# Patient Record
Sex: MALE | Race: WHITE | Employment: FULL TIME | URBAN - METROPOLITAN AREA
[De-identification: names, ages, dates, MRNs, and addresses within clinical notes are randomized per-mention and may not be internally consistent; named-entity substitution may affect disease eponyms.]

---

## 2017-01-08 ENCOUNTER — HOSPITAL ENCOUNTER (OUTPATIENT)
Dept: SLEEP CENTER | Age: 37
Discharge: HOME | End: 2017-01-08
Attending: PSYCHIATRY & NEUROLOGY | Admitting: PSYCHIATRY & NEUROLOGY

## 2017-01-25 ENCOUNTER — HOSPITAL ENCOUNTER (OUTPATIENT)
Dept: SLEEP CENTER | Age: 37
Discharge: HOME | End: 2017-01-25
Attending: PSYCHIATRY & NEUROLOGY | Admitting: PSYCHIATRY & NEUROLOGY

## 2017-02-19 ENCOUNTER — HOSPITAL ENCOUNTER (OUTPATIENT)
Dept: SLEEP CENTER | Age: 37
Discharge: HOME | End: 2017-02-19
Attending: PSYCHIATRY & NEUROLOGY | Admitting: PSYCHIATRY & NEUROLOGY

## 2017-02-21 DIAGNOSIS — J45.21 ASTHMATIC BRONCHITIS, MILD INTERMITTENT, WITH ACUTE EXACERBATION: ICD-10-CM

## 2017-04-05 RX ORDER — FLUOXETINE HYDROCHLORIDE 40 MG/1
40 CAPSULE ORAL DAILY
Qty: 90 CAPSULE | Refills: 0 | Status: SHIPPED | OUTPATIENT
Start: 2017-04-05 | End: 2017-06-09 | Stop reason: ALTCHOICE

## 2017-04-10 ENCOUNTER — OFFICE VISIT (OUTPATIENT)
Dept: FAMILY MEDICINE CLINIC | Age: 37
End: 2017-04-10

## 2017-04-10 VITALS
SYSTOLIC BLOOD PRESSURE: 126 MMHG | HEIGHT: 72 IN | DIASTOLIC BLOOD PRESSURE: 82 MMHG | RESPIRATION RATE: 20 BRPM | BODY MASS INDEX: 31.97 KG/M2 | HEART RATE: 77 BPM | WEIGHT: 236 LBS | OXYGEN SATURATION: 97 %

## 2017-04-10 DIAGNOSIS — M72.2 PLANTAR FASCIITIS, RIGHT: Primary | ICD-10-CM

## 2017-04-10 DIAGNOSIS — M79.671 RIGHT FOOT PAIN: ICD-10-CM

## 2017-04-10 PROCEDURE — 99213 OFFICE O/P EST LOW 20 MIN: CPT | Performed by: NURSE PRACTITIONER

## 2017-04-10 RX ORDER — NAPROXEN 500 MG/1
500 TABLET ORAL 2 TIMES DAILY
Qty: 20 TABLET | Refills: 0 | Status: SHIPPED | OUTPATIENT
Start: 2017-04-10 | End: 2017-08-17

## 2017-04-10 ASSESSMENT — ENCOUNTER SYMPTOMS
SHORTNESS OF BREATH: 0
ABDOMINAL PAIN: 0
COUGH: 0

## 2017-04-19 ENCOUNTER — HOSPITAL ENCOUNTER (OUTPATIENT)
Dept: SLEEP CENTER | Age: 37
Discharge: HOME | End: 2017-04-19
Attending: PSYCHIATRY & NEUROLOGY | Admitting: PSYCHIATRY & NEUROLOGY

## 2017-04-27 DIAGNOSIS — J45.21 ASTHMATIC BRONCHITIS, MILD INTERMITTENT, WITH ACUTE EXACERBATION: ICD-10-CM

## 2017-05-08 PROBLEM — R07.9 CHEST PAIN: Status: ACTIVE | Noted: 2017-05-08

## 2017-05-09 PROBLEM — R07.9 CHEST PAIN: Status: RESOLVED | Noted: 2017-05-08 | Resolved: 2017-05-09

## 2017-05-11 ENCOUNTER — HOSPITAL ENCOUNTER (OUTPATIENT)
Dept: CARDIAC REHAB | Age: 37
Discharge: HOME | End: 2017-05-11
Attending: FAMILY MEDICINE | Admitting: FAMILY MEDICINE

## 2017-05-11 LAB
LV EF: 60 %
LVEF MODALITY: NORMAL

## 2017-05-12 ENCOUNTER — OFFICE VISIT (OUTPATIENT)
Dept: FAMILY MEDICINE CLINIC | Age: 37
End: 2017-05-12

## 2017-05-12 VITALS
SYSTOLIC BLOOD PRESSURE: 128 MMHG | DIASTOLIC BLOOD PRESSURE: 88 MMHG | WEIGHT: 239 LBS | RESPIRATION RATE: 20 BRPM | OXYGEN SATURATION: 98 % | BODY MASS INDEX: 31.68 KG/M2 | HEART RATE: 92 BPM | HEIGHT: 73 IN

## 2017-05-12 DIAGNOSIS — I10 ESSENTIAL HYPERTENSION: ICD-10-CM

## 2017-05-12 DIAGNOSIS — R07.9 CHEST PAIN, UNSPECIFIED TYPE: Primary | ICD-10-CM

## 2017-05-12 DIAGNOSIS — M54.12 CERVICAL RADICULOPATHY: ICD-10-CM

## 2017-05-12 DIAGNOSIS — M79.671 RIGHT FOOT PAIN: ICD-10-CM

## 2017-05-12 PROCEDURE — 99214 OFFICE O/P EST MOD 30 MIN: CPT | Performed by: FAMILY MEDICINE

## 2017-05-12 RX ORDER — LISINOPRIL 10 MG/1
10 TABLET ORAL DAILY
Qty: 30 TABLET | Refills: 3 | Status: SHIPPED | OUTPATIENT
Start: 2017-05-12 | End: 2017-06-09 | Stop reason: SINTOL

## 2017-05-12 ASSESSMENT — PATIENT HEALTH QUESTIONNAIRE - PHQ9
2. FEELING DOWN, DEPRESSED OR HOPELESS: 0
SUM OF ALL RESPONSES TO PHQ QUESTIONS 1-9: 0
1. LITTLE INTEREST OR PLEASURE IN DOING THINGS: 0
SUM OF ALL RESPONSES TO PHQ9 QUESTIONS 1 & 2: 0

## 2017-06-05 RX ORDER — BUSPIRONE HYDROCHLORIDE 10 MG/1
TABLET ORAL
Qty: 90 TABLET | Refills: 4 | OUTPATIENT
Start: 2017-06-05

## 2017-06-05 RX ORDER — BUSPIRONE HYDROCHLORIDE 10 MG/1
10 TABLET ORAL 3 TIMES DAILY
Qty: 90 TABLET | Refills: 5 | Status: SHIPPED | OUTPATIENT
Start: 2017-06-05 | End: 2017-07-07 | Stop reason: SDUPTHER

## 2017-06-09 ENCOUNTER — OFFICE VISIT (OUTPATIENT)
Dept: FAMILY MEDICINE CLINIC | Age: 37
End: 2017-06-09

## 2017-06-09 VITALS
WEIGHT: 238 LBS | DIASTOLIC BLOOD PRESSURE: 84 MMHG | HEIGHT: 73 IN | OXYGEN SATURATION: 98 % | SYSTOLIC BLOOD PRESSURE: 126 MMHG | HEART RATE: 64 BPM | BODY MASS INDEX: 31.54 KG/M2

## 2017-06-09 DIAGNOSIS — F33.1 MODERATE EPISODE OF RECURRENT MAJOR DEPRESSIVE DISORDER (HCC): ICD-10-CM

## 2017-06-09 DIAGNOSIS — Z11.4 SCREENING FOR HIV WITHOUT PRESENCE OF RISK FACTORS: ICD-10-CM

## 2017-06-09 DIAGNOSIS — G57.93 NEUROPATHY OF BOTH FEET: ICD-10-CM

## 2017-06-09 DIAGNOSIS — I10 ESSENTIAL HYPERTENSION: ICD-10-CM

## 2017-06-09 DIAGNOSIS — I10 ESSENTIAL HYPERTENSION: Primary | ICD-10-CM

## 2017-06-09 LAB
A/G RATIO: 1.6 (ref 1.1–2.2)
ALBUMIN SERPL-MCNC: 4.2 G/DL (ref 3.4–5)
ALP BLD-CCNC: 85 U/L (ref 40–129)
ALT SERPL-CCNC: 14 U/L (ref 10–40)
ANION GAP SERPL CALCULATED.3IONS-SCNC: 14 MMOL/L (ref 3–16)
AST SERPL-CCNC: 13 U/L (ref 15–37)
BILIRUB SERPL-MCNC: 0.3 MG/DL (ref 0–1)
BUN BLDV-MCNC: 17 MG/DL (ref 7–20)
CALCIUM SERPL-MCNC: 9.4 MG/DL (ref 8.3–10.6)
CHLORIDE BLD-SCNC: 100 MMOL/L (ref 99–110)
CO2: 25 MMOL/L (ref 21–32)
CREAT SERPL-MCNC: 0.8 MG/DL (ref 0.9–1.3)
GFR AFRICAN AMERICAN: >60
GFR NON-AFRICAN AMERICAN: >60
GLOBULIN: 2.6 G/DL
GLUCOSE BLD-MCNC: 91 MG/DL (ref 70–99)
MAGNESIUM: 2.1 MG/DL (ref 1.8–2.4)
POTASSIUM SERPL-SCNC: 4.2 MMOL/L (ref 3.5–5.1)
SODIUM BLD-SCNC: 139 MMOL/L (ref 136–145)
TOTAL PROTEIN: 6.8 G/DL (ref 6.4–8.2)
TSH SERPL DL<=0.05 MIU/L-ACNC: 3.7 UIU/ML (ref 0.27–4.2)

## 2017-06-09 PROCEDURE — 99214 OFFICE O/P EST MOD 30 MIN: CPT | Performed by: FAMILY MEDICINE

## 2017-06-09 RX ORDER — LOSARTAN POTASSIUM 50 MG/1
50 TABLET ORAL DAILY
Qty: 30 TABLET | Refills: 3 | Status: SHIPPED | OUTPATIENT
Start: 2017-06-09 | End: 2017-10-17 | Stop reason: SDUPTHER

## 2017-06-09 RX ORDER — FLUOXETINE HYDROCHLORIDE 40 MG/1
40 CAPSULE ORAL DAILY
Qty: 90 CAPSULE | Refills: 0 | Status: CANCELLED | OUTPATIENT
Start: 2017-06-09

## 2017-06-09 RX ORDER — DULOXETIN HYDROCHLORIDE 60 MG/1
60 CAPSULE, DELAYED RELEASE ORAL DAILY
Qty: 30 CAPSULE | Refills: 3 | Status: SHIPPED | OUTPATIENT
Start: 2017-06-09 | End: 2017-07-07 | Stop reason: SDUPTHER

## 2017-06-09 ASSESSMENT — ENCOUNTER SYMPTOMS
WHEEZING: 1
EYES NEGATIVE: 1
SINUS PRESSURE: 0
SHORTNESS OF BREATH: 0
BACK PAIN: 1
CHEST TIGHTNESS: 0
SORE THROAT: 0
CONSTIPATION: 0
NAUSEA: 0
VOMITING: 0
DIARRHEA: 0
BLOOD IN STOOL: 0
COUGH: 0
RHINORRHEA: 0
ABDOMINAL PAIN: 0

## 2017-06-11 LAB — HIV-1 AND HIV-2 ANTIBODIES: NEGATIVE

## 2017-06-21 DIAGNOSIS — F33.1 MODERATE EPISODE OF RECURRENT MAJOR DEPRESSIVE DISORDER (HCC): ICD-10-CM

## 2017-06-21 DIAGNOSIS — G57.93 NEUROPATHY OF BOTH FEET: Primary | ICD-10-CM

## 2017-07-07 ENCOUNTER — OFFICE VISIT (OUTPATIENT)
Dept: FAMILY MEDICINE CLINIC | Age: 37
End: 2017-07-07

## 2017-07-07 VITALS
WEIGHT: 238.8 LBS | BODY MASS INDEX: 31.65 KG/M2 | SYSTOLIC BLOOD PRESSURE: 112 MMHG | HEART RATE: 81 BPM | HEIGHT: 73 IN | DIASTOLIC BLOOD PRESSURE: 82 MMHG

## 2017-07-07 DIAGNOSIS — F33.1 MODERATE EPISODE OF RECURRENT MAJOR DEPRESSIVE DISORDER (HCC): ICD-10-CM

## 2017-07-07 DIAGNOSIS — I10 ESSENTIAL HYPERTENSION: Primary | ICD-10-CM

## 2017-07-07 DIAGNOSIS — M54.12 CERVICAL RADICULOPATHY: ICD-10-CM

## 2017-07-07 DIAGNOSIS — G57.93 NEUROPATHY OF BOTH FEET: ICD-10-CM

## 2017-07-07 DIAGNOSIS — F41.9 ANXIETY: ICD-10-CM

## 2017-07-07 PROCEDURE — 99214 OFFICE O/P EST MOD 30 MIN: CPT | Performed by: FAMILY MEDICINE

## 2017-07-07 RX ORDER — GABAPENTIN 600 MG/1
600 TABLET ORAL 3 TIMES DAILY
Qty: 90 TABLET | Refills: 5 | Status: SHIPPED | OUTPATIENT
Start: 2017-07-07 | End: 2018-01-22 | Stop reason: SDUPTHER

## 2017-07-07 RX ORDER — DULOXETIN HYDROCHLORIDE 60 MG/1
60 CAPSULE, DELAYED RELEASE ORAL DAILY
Qty: 30 CAPSULE | Refills: 3 | Status: SHIPPED | OUTPATIENT
Start: 2017-07-07 | End: 2018-04-23 | Stop reason: ALTCHOICE

## 2017-07-07 RX ORDER — BUSPIRONE HYDROCHLORIDE 15 MG/1
15 TABLET ORAL 3 TIMES DAILY
Qty: 90 TABLET | Refills: 3 | Status: SHIPPED | OUTPATIENT
Start: 2017-07-07 | End: 2017-11-01 | Stop reason: SDUPTHER

## 2017-07-07 ASSESSMENT — ENCOUNTER SYMPTOMS
BACK PAIN: 1
VOMITING: 0
SHORTNESS OF BREATH: 0
COUGH: 0
SINUS PRESSURE: 0
NAUSEA: 0
BLOOD IN STOOL: 0
ABDOMINAL PAIN: 0
SORE THROAT: 0
CHEST TIGHTNESS: 0
EYES NEGATIVE: 1
RHINORRHEA: 0
DIARRHEA: 0
WHEEZING: 1
CONSTIPATION: 0

## 2017-07-19 ENCOUNTER — HOSPITAL ENCOUNTER (OUTPATIENT)
Dept: SLEEP CENTER | Age: 37
Discharge: HOME | End: 2017-07-19
Attending: PSYCHIATRY & NEUROLOGY | Admitting: PSYCHIATRY & NEUROLOGY

## 2017-08-17 ENCOUNTER — OFFICE VISIT (OUTPATIENT)
Dept: FAMILY MEDICINE CLINIC | Age: 37
End: 2017-08-17

## 2017-08-17 VITALS
SYSTOLIC BLOOD PRESSURE: 122 MMHG | WEIGHT: 237 LBS | HEIGHT: 72 IN | RESPIRATION RATE: 16 BRPM | OXYGEN SATURATION: 98 % | DIASTOLIC BLOOD PRESSURE: 78 MMHG | BODY MASS INDEX: 32.1 KG/M2 | HEART RATE: 74 BPM

## 2017-08-17 DIAGNOSIS — N52.9 ERECTILE DYSFUNCTION, UNSPECIFIED ERECTILE DYSFUNCTION TYPE: Primary | ICD-10-CM

## 2017-08-17 DIAGNOSIS — Z72.0 TOBACCO USE: ICD-10-CM

## 2017-08-17 PROCEDURE — 99213 OFFICE O/P EST LOW 20 MIN: CPT | Performed by: NURSE PRACTITIONER

## 2017-08-17 RX ORDER — NICOTINE 21 MG/24HR
1 PATCH, TRANSDERMAL 24 HOURS TRANSDERMAL EVERY 24 HOURS
Qty: 30 PATCH | Refills: 1 | Status: SHIPPED | OUTPATIENT
Start: 2017-08-17 | End: 2017-12-21

## 2017-08-17 ASSESSMENT — ENCOUNTER SYMPTOMS
CHEST TIGHTNESS: 0
ABDOMINAL PAIN: 0
NAUSEA: 0
COUGH: 0
VOMITING: 0
CONSTIPATION: 0
WHEEZING: 0
DIARRHEA: 0
SHORTNESS OF BREATH: 0

## 2017-10-17 DIAGNOSIS — I10 ESSENTIAL HYPERTENSION: ICD-10-CM

## 2017-10-17 RX ORDER — LOSARTAN POTASSIUM 50 MG/1
TABLET ORAL
Qty: 30 TABLET | Refills: 2 | Status: SHIPPED | OUTPATIENT
Start: 2017-10-17 | End: 2018-01-17 | Stop reason: SDUPTHER

## 2017-11-01 DIAGNOSIS — F41.9 ANXIETY: ICD-10-CM

## 2017-11-01 RX ORDER — BUSPIRONE HYDROCHLORIDE 15 MG/1
TABLET ORAL
Qty: 90 TABLET | Refills: 2 | Status: SHIPPED | OUTPATIENT
Start: 2017-11-01 | End: 2018-01-30 | Stop reason: SDUPTHER

## 2018-01-04 ENCOUNTER — OFFICE VISIT (OUTPATIENT)
Dept: FAMILY MEDICINE CLINIC | Age: 38
End: 2018-01-04

## 2018-01-04 VITALS
WEIGHT: 250 LBS | HEART RATE: 88 BPM | SYSTOLIC BLOOD PRESSURE: 132 MMHG | BODY MASS INDEX: 33.91 KG/M2 | OXYGEN SATURATION: 97 % | DIASTOLIC BLOOD PRESSURE: 84 MMHG | RESPIRATION RATE: 16 BRPM | TEMPERATURE: 98.1 F

## 2018-01-04 DIAGNOSIS — R11.15 NON-INTRACTABLE CYCLICAL VOMITING WITH NAUSEA: Primary | ICD-10-CM

## 2018-01-04 DIAGNOSIS — R19.7 DIARRHEA, UNSPECIFIED TYPE: ICD-10-CM

## 2018-01-04 DIAGNOSIS — S39.012A BACK STRAIN, INITIAL ENCOUNTER: ICD-10-CM

## 2018-01-04 PROCEDURE — G8427 DOCREV CUR MEDS BY ELIG CLIN: HCPCS | Performed by: NURSE PRACTITIONER

## 2018-01-04 PROCEDURE — 4004F PT TOBACCO SCREEN RCVD TLK: CPT | Performed by: NURSE PRACTITIONER

## 2018-01-04 PROCEDURE — 99214 OFFICE O/P EST MOD 30 MIN: CPT | Performed by: NURSE PRACTITIONER

## 2018-01-04 PROCEDURE — G8484 FLU IMMUNIZE NO ADMIN: HCPCS | Performed by: NURSE PRACTITIONER

## 2018-01-04 PROCEDURE — G8417 CALC BMI ABV UP PARAM F/U: HCPCS | Performed by: NURSE PRACTITIONER

## 2018-01-04 RX ORDER — ONDANSETRON 4 MG/1
4 TABLET, FILM COATED ORAL EVERY 8 HOURS PRN
Qty: 15 TABLET | Refills: 0 | Status: SHIPPED | OUTPATIENT
Start: 2018-01-04 | End: 2018-04-23 | Stop reason: ALTCHOICE

## 2018-01-04 RX ORDER — METHYLPREDNISOLONE 4 MG/1
TABLET ORAL
Qty: 1 KIT | Refills: 0 | Status: SHIPPED | OUTPATIENT
Start: 2018-01-04 | End: 2018-01-10

## 2018-01-04 ASSESSMENT — ENCOUNTER SYMPTOMS
VOMITING: 1
ABDOMINAL PAIN: 0
WHEEZING: 0
SHORTNESS OF BREATH: 0
DIARRHEA: 1
CHEST TIGHTNESS: 0
RHINORRHEA: 0
BACK PAIN: 1
NAUSEA: 1
COUGH: 0

## 2018-01-04 NOTE — PROGRESS NOTES
improve.       (Please note that portions of this note may have been completed with a voice recognition program. Efforts were made to edit the dictations but occasionally words are mis-transcribed.)

## 2018-01-04 NOTE — LETTER
[facility] [address] [place] [address]  Phone: [phone]  Fax: [phone]    Ashley Vaughn CNP        January 4, 2018     Patient: Chloe Laboy   YOB: 1980   Date of Visit: 1/4/2018       To Whom It May Concern: It is my medical opinion that Blanca Dennison may return to work on 1/5/18. If you have any questions or concerns, please don't hesitate to call.     Sincerely,        Ashley Vaughn CNP

## 2018-01-17 DIAGNOSIS — I10 ESSENTIAL HYPERTENSION: ICD-10-CM

## 2018-01-17 RX ORDER — LOSARTAN POTASSIUM 50 MG/1
TABLET ORAL
Qty: 30 TABLET | Refills: 1 | Status: SHIPPED | OUTPATIENT
Start: 2018-01-17 | End: 2018-03-17 | Stop reason: SDUPTHER

## 2018-01-22 ENCOUNTER — OFFICE VISIT (OUTPATIENT)
Dept: FAMILY MEDICINE CLINIC | Age: 38
End: 2018-01-22

## 2018-01-22 VITALS
WEIGHT: 250 LBS | SYSTOLIC BLOOD PRESSURE: 118 MMHG | RESPIRATION RATE: 15 BRPM | HEART RATE: 70 BPM | BODY MASS INDEX: 33.91 KG/M2 | DIASTOLIC BLOOD PRESSURE: 80 MMHG

## 2018-01-22 DIAGNOSIS — R10.31 RIGHT INGUINAL PAIN: ICD-10-CM

## 2018-01-22 DIAGNOSIS — F33.1 MODERATE EPISODE OF RECURRENT MAJOR DEPRESSIVE DISORDER (HCC): ICD-10-CM

## 2018-01-22 DIAGNOSIS — M54.12 CERVICAL RADICULOPATHY: ICD-10-CM

## 2018-01-22 DIAGNOSIS — R73.9 HYPERGLYCEMIA: ICD-10-CM

## 2018-01-22 DIAGNOSIS — N20.1 URETEROLITHIASIS: Primary | ICD-10-CM

## 2018-01-22 DIAGNOSIS — G57.93 NEUROPATHY OF BOTH FEET: ICD-10-CM

## 2018-01-22 DIAGNOSIS — I10 ESSENTIAL HYPERTENSION: ICD-10-CM

## 2018-01-22 DIAGNOSIS — Z72.0 TOBACCO USE: ICD-10-CM

## 2018-01-22 DIAGNOSIS — R74.01 ELEVATED TRANSAMINASE LEVEL: ICD-10-CM

## 2018-01-22 PROCEDURE — G8482 FLU IMMUNIZE ORDER/ADMIN: HCPCS | Performed by: FAMILY MEDICINE

## 2018-01-22 PROCEDURE — G8417 CALC BMI ABV UP PARAM F/U: HCPCS | Performed by: FAMILY MEDICINE

## 2018-01-22 PROCEDURE — 4004F PT TOBACCO SCREEN RCVD TLK: CPT | Performed by: FAMILY MEDICINE

## 2018-01-22 PROCEDURE — 99214 OFFICE O/P EST MOD 30 MIN: CPT | Performed by: FAMILY MEDICINE

## 2018-01-22 PROCEDURE — G8427 DOCREV CUR MEDS BY ELIG CLIN: HCPCS | Performed by: FAMILY MEDICINE

## 2018-01-22 RX ORDER — INDAPAMIDE 1.25 MG/1
1.25 TABLET, FILM COATED ORAL EVERY MORNING
Qty: 30 TABLET | Refills: 5 | Status: SHIPPED | OUTPATIENT
Start: 2018-01-22 | End: 2018-04-23 | Stop reason: SDUPTHER

## 2018-01-22 RX ORDER — HYDROCODONE BITARTRATE AND ACETAMINOPHEN 5; 325 MG/1; MG/1
1 TABLET ORAL EVERY 6 HOURS PRN
Qty: 12 TABLET | Refills: 0 | Status: SHIPPED | OUTPATIENT
Start: 2018-01-22 | End: 2018-01-25

## 2018-01-22 RX ORDER — GABAPENTIN 600 MG/1
600 TABLET ORAL 3 TIMES DAILY
Qty: 90 TABLET | Refills: 2 | Status: SHIPPED | OUTPATIENT
Start: 2018-01-22 | End: 2018-04-23 | Stop reason: SDUPTHER

## 2018-01-22 ASSESSMENT — ENCOUNTER SYMPTOMS
COUGH: 0
SINUS PRESSURE: 0
CHEST TIGHTNESS: 0
BLOOD IN STOOL: 0
CONSTIPATION: 0
RHINORRHEA: 0
SORE THROAT: 0
EYES NEGATIVE: 1
VOMITING: 0
ABDOMINAL PAIN: 0
WHEEZING: 1
DIARRHEA: 0
BACK PAIN: 1
SHORTNESS OF BREATH: 0
NAUSEA: 0

## 2018-01-22 NOTE — PROGRESS NOTES
SUBJECTIVE:    HPI:   Cervical radiculopathy  Patient has cervical radiculopathy for which she uses gabapentin with success. We discussed the fact that gabapentin is now being watched) he will need to come in every 3 months. Elevated transaminase level  History of elevated transaminase level. Had blood work done in the emergency room last week with resolution of this problem    Hyperglycemia  Nonfasting sugar was elevated in the emergency room but previous sugar was 94 patient is not diabetic    Moderate episode of recurrent major depressive disorder ([address]  Depression is improved on Cymbalta    Neuropathy of both feet ([address]  Has burning in his feet which is worse from his current job now 7 days a week walking on concrete. No sores noted and gabapentin helps    Tobacco use  Counseled    Ureterolithiasis  Patient was diagnosed with a kidney stone. 3 previous kidney stones had been on the left side but now is had to limit last several months on the right side radiating into his right groin with right groin pain. Initial evaluation done in the emergency room the patient's pain is significant for a better but continues to be a problem. Patient be given a very small prescription for Norco for kidney stone pain. He has quit drinking carbonated beverages. He's been encouraged to add unsweetened tea which actually at improves kidney stones to his diet. We had a very low dose of a thiazide diuretic to decrease calcium secretion hopefully decrease his risk for kidney stones. If his blood pressure drops we will need to drop the losartan      CHIEF COMPLAINT:    Chief Complaint   Patient presents with    Medication Check    Medication Refill    Flu Vaccine     pt declines today    ED Follow-up     pt was in er this week for kidney stone       REVIEW OF SYSTEMS:    Review of Systems   Constitutional: Negative for activity change, chills, diaphoresis, fatigue and fever (felt warm this past weekend).    HENT: in the emergency room but previous sugar was 94 patient is not diabetic    Moderate episode of recurrent major depressive disorder (HCC)  Depression is improved on Cymbalta    Neuropathy of both feet (HCC)  Has burning in his feet which is worse from his current job now 7 days a week walking on concrete. No sores noted and gabapentin helps    Tobacco use  Counseled    Ureterolithiasis  Patient was diagnosed with a kidney stone. 3 previous kidney stones had been on the left side but now is had to limit last several months on the right side radiating into his right groin with right groin pain. Initial evaluation done in the emergency room the patient's pain is significant for a better but continues to be a problem. Patient be given a very small prescription for Norco for kidney stone pain. He has quit drinking carbonated beverages. He's been encouraged to add unsweetened tea which actually at improves kidney stones to his diet. We had a very low dose of a thiazide diuretic to decrease calcium secretion hopefully decrease his risk for kidney stones.   If his blood pressure drops we will need to drop the losartan

## 2018-01-22 NOTE — ASSESSMENT & PLAN NOTE
Patient has cervical radiculopathy for which she uses gabapentin with success. We discussed the fact that gabapentin is now being watched) he will need to come in every 3 months.

## 2018-01-22 NOTE — ASSESSMENT & PLAN NOTE
History of elevated transaminase level.   Had blood work done in the emergency room last week with resolution of this problem

## 2018-01-30 DIAGNOSIS — F41.9 ANXIETY: ICD-10-CM

## 2018-01-30 RX ORDER — BUSPIRONE HYDROCHLORIDE 15 MG/1
TABLET ORAL
Qty: 90 TABLET | Refills: 1 | Status: SHIPPED | OUTPATIENT
Start: 2018-01-30 | End: 2018-04-23 | Stop reason: SDUPTHER

## 2018-02-17 DIAGNOSIS — J45.21 ASTHMATIC BRONCHITIS, MILD INTERMITTENT, WITH ACUTE EXACERBATION: ICD-10-CM

## 2018-03-17 DIAGNOSIS — I10 ESSENTIAL HYPERTENSION: ICD-10-CM

## 2018-03-19 RX ORDER — LOSARTAN POTASSIUM 50 MG/1
TABLET ORAL
Qty: 30 TABLET | Refills: 0 | Status: SHIPPED | OUTPATIENT
Start: 2018-03-19 | End: 2018-04-18 | Stop reason: SDUPTHER

## 2018-03-27 ENCOUNTER — OFFICE VISIT (OUTPATIENT)
Dept: FAMILY MEDICINE CLINIC | Age: 38
End: 2018-03-27

## 2018-03-27 VITALS
RESPIRATION RATE: 16 BRPM | WEIGHT: 244.2 LBS | SYSTOLIC BLOOD PRESSURE: 138 MMHG | DIASTOLIC BLOOD PRESSURE: 92 MMHG | HEART RATE: 82 BPM | OXYGEN SATURATION: 98 % | BODY MASS INDEX: 33.08 KG/M2 | HEIGHT: 72 IN

## 2018-03-27 DIAGNOSIS — R10.9 ACUTE LEFT FLANK PAIN: Primary | ICD-10-CM

## 2018-03-27 DIAGNOSIS — F11.91 HISTORY OF NARCOTIC USE: ICD-10-CM

## 2018-03-27 DIAGNOSIS — N20.0 KIDNEY STONE: ICD-10-CM

## 2018-03-27 DIAGNOSIS — R31.9 HEMATURIA, UNSPECIFIED TYPE: ICD-10-CM

## 2018-03-27 DIAGNOSIS — R10.9 ACUTE LEFT FLANK PAIN: ICD-10-CM

## 2018-03-27 LAB
A/G RATIO: 1.6 (ref 1.1–2.2)
ALBUMIN SERPL-MCNC: 4.3 G/DL (ref 3.4–5)
ALP BLD-CCNC: 79 U/L (ref 40–129)
ALT SERPL-CCNC: 18 U/L (ref 10–40)
ANION GAP SERPL CALCULATED.3IONS-SCNC: 19 MMOL/L (ref 3–16)
AST SERPL-CCNC: 16 U/L (ref 15–37)
BASOPHILS ABSOLUTE: 0 K/UL (ref 0–0.2)
BASOPHILS RELATIVE PERCENT: 0.3 %
BILIRUB SERPL-MCNC: 0.5 MG/DL (ref 0–1)
BILIRUBIN, POC: NEGATIVE
BLOOD URINE, POC: ABNORMAL
BUN BLDV-MCNC: 17 MG/DL (ref 7–20)
CALCIUM SERPL-MCNC: 9.6 MG/DL (ref 8.3–10.6)
CHLORIDE BLD-SCNC: 100 MMOL/L (ref 99–110)
CLARITY, POC: CLEAR
CO2: 23 MMOL/L (ref 21–32)
COLOR, POC: ABNORMAL
CREAT SERPL-MCNC: 0.8 MG/DL (ref 0.9–1.3)
EOSINOPHILS ABSOLUTE: 0.1 K/UL (ref 0–0.6)
EOSINOPHILS RELATIVE PERCENT: 0.7 %
GFR AFRICAN AMERICAN: >60
GFR NON-AFRICAN AMERICAN: >60
GLOBULIN: 2.7 G/DL
GLUCOSE BLD-MCNC: 95 MG/DL (ref 70–99)
GLUCOSE URINE, POC: NEGATIVE
HCT VFR BLD CALC: 49.9 % (ref 40.5–52.5)
HEMOGLOBIN: 16.9 G/DL (ref 13.5–17.5)
KETONES, POC: NEGATIVE
LEUKOCYTE EST, POC: NEGATIVE
LYMPHOCYTES ABSOLUTE: 3.3 K/UL (ref 1–5.1)
LYMPHOCYTES RELATIVE PERCENT: 26.8 %
Lab: NORMAL
MCH RBC QN AUTO: 28.9 PG (ref 26–34)
MCHC RBC AUTO-ENTMCNC: 33.9 G/DL (ref 31–36)
MCV RBC AUTO: 85.3 FL (ref 80–100)
MONOCYTES ABSOLUTE: 0.8 K/UL (ref 0–1.3)
MONOCYTES RELATIVE PERCENT: 6.3 %
NEUTROPHILS ABSOLUTE: 8 K/UL (ref 1.7–7.7)
NEUTROPHILS RELATIVE PERCENT: 65.9 %
NITRITE, POC: NEGATIVE
OPIATE SCREEN URINE: NORMAL
PDW BLD-RTO: 14.3 % (ref 12.4–15.4)
PH UA: 5
PH, POC: 5.5
PLATELET # BLD: 274 K/UL (ref 135–450)
PMV BLD AUTO: 8.4 FL (ref 5–10.5)
POTASSIUM SERPL-SCNC: 4.3 MMOL/L (ref 3.5–5.1)
PROTEIN, POC: NEGATIVE
RBC # BLD: 5.85 M/UL (ref 4.2–5.9)
SODIUM BLD-SCNC: 142 MMOL/L (ref 136–145)
SPECIFIC GRAVITY, POC: 1.02
TOTAL PROTEIN: 7 G/DL (ref 6.4–8.2)
UROBILINOGEN, POC: 0.2
WBC # BLD: 12.1 K/UL (ref 4–11)

## 2018-03-27 PROCEDURE — G8427 DOCREV CUR MEDS BY ELIG CLIN: HCPCS | Performed by: FAMILY MEDICINE

## 2018-03-27 PROCEDURE — G8482 FLU IMMUNIZE ORDER/ADMIN: HCPCS | Performed by: FAMILY MEDICINE

## 2018-03-27 PROCEDURE — 4004F PT TOBACCO SCREEN RCVD TLK: CPT | Performed by: FAMILY MEDICINE

## 2018-03-27 PROCEDURE — 99214 OFFICE O/P EST MOD 30 MIN: CPT | Performed by: FAMILY MEDICINE

## 2018-03-27 PROCEDURE — 81002 URINALYSIS NONAUTO W/O SCOPE: CPT | Performed by: FAMILY MEDICINE

## 2018-03-27 PROCEDURE — G8417 CALC BMI ABV UP PARAM F/U: HCPCS | Performed by: FAMILY MEDICINE

## 2018-03-27 RX ORDER — LEVOFLOXACIN 250 MG/1
250 TABLET ORAL DAILY
Qty: 7 TABLET | Refills: 0 | Status: SHIPPED | OUTPATIENT
Start: 2018-03-27 | End: 2018-04-03

## 2018-03-27 RX ORDER — TAMSULOSIN HYDROCHLORIDE 0.4 MG/1
0.4 CAPSULE ORAL 2 TIMES DAILY
Qty: 20 CAPSULE | Refills: 3 | Status: SHIPPED | OUTPATIENT
Start: 2018-03-27 | End: 2018-04-23 | Stop reason: ALTCHOICE

## 2018-03-27 RX ORDER — HYDROCODONE BITARTRATE AND ACETAMINOPHEN 5; 325 MG/1; MG/1
1 TABLET ORAL EVERY 6 HOURS PRN
Qty: 28 TABLET | Refills: 0 | Status: SHIPPED | OUTPATIENT
Start: 2018-03-27 | End: 2018-04-03

## 2018-03-27 RX ORDER — FLUOXETINE HYDROCHLORIDE 40 MG/1
CAPSULE ORAL
COMMUNITY
Start: 2018-03-01 | End: 2018-04-23 | Stop reason: SDUPTHER

## 2018-03-27 ASSESSMENT — ENCOUNTER SYMPTOMS
ABDOMINAL PAIN: 0
BLOOD IN STOOL: 0
EYES NEGATIVE: 1
RHINORRHEA: 0
VOMITING: 0
DIARRHEA: 0
WHEEZING: 0
SINUS PRESSURE: 0
CONSTIPATION: 0
SORE THROAT: 0
CHEST TIGHTNESS: 0
COUGH: 0
NAUSEA: 0
BACK PAIN: 1
SHORTNESS OF BREATH: 0

## 2018-03-29 LAB — URINE CULTURE, ROUTINE: NORMAL

## 2018-04-04 ENCOUNTER — HOSPITAL ENCOUNTER (OUTPATIENT)
Dept: ULTRASOUND IMAGING | Age: 38
Discharge: HOME | End: 2018-04-04
Attending: FAMILY MEDICINE | Admitting: FAMILY MEDICINE

## 2018-04-04 DIAGNOSIS — N20.0 KIDNEY STONE: ICD-10-CM

## 2018-04-04 DIAGNOSIS — R10.9 ACUTE LEFT FLANK PAIN: ICD-10-CM

## 2018-04-04 DIAGNOSIS — N20.0 CALCULUS OF KIDNEY: ICD-10-CM

## 2018-04-18 DIAGNOSIS — I10 ESSENTIAL HYPERTENSION: ICD-10-CM

## 2018-04-18 RX ORDER — LOSARTAN POTASSIUM 50 MG/1
TABLET ORAL
Qty: 30 TABLET | Refills: 0 | Status: SHIPPED | OUTPATIENT
Start: 2018-04-18 | End: 2018-04-23 | Stop reason: SDUPTHER

## 2018-04-23 ENCOUNTER — OFFICE VISIT (OUTPATIENT)
Dept: FAMILY MEDICINE CLINIC | Age: 38
End: 2018-04-23

## 2018-04-23 VITALS
HEART RATE: 68 BPM | DIASTOLIC BLOOD PRESSURE: 84 MMHG | BODY MASS INDEX: 32.9 KG/M2 | WEIGHT: 242.6 LBS | RESPIRATION RATE: 20 BRPM | OXYGEN SATURATION: 100 % | SYSTOLIC BLOOD PRESSURE: 122 MMHG

## 2018-04-23 DIAGNOSIS — I10 ESSENTIAL HYPERTENSION: Primary | ICD-10-CM

## 2018-04-23 DIAGNOSIS — Z72.0 TOBACCO USE: ICD-10-CM

## 2018-04-23 DIAGNOSIS — F41.9 ANXIETY: ICD-10-CM

## 2018-04-23 DIAGNOSIS — M54.12 CERVICAL RADICULOPATHY: ICD-10-CM

## 2018-04-23 DIAGNOSIS — F33.1 MODERATE EPISODE OF RECURRENT MAJOR DEPRESSIVE DISORDER (HCC): ICD-10-CM

## 2018-04-23 DIAGNOSIS — G57.93 NEUROPATHY OF BOTH FEET: ICD-10-CM

## 2018-04-23 DIAGNOSIS — N20.1 URETEROLITHIASIS: ICD-10-CM

## 2018-04-23 PROCEDURE — G8417 CALC BMI ABV UP PARAM F/U: HCPCS | Performed by: FAMILY MEDICINE

## 2018-04-23 PROCEDURE — 4004F PT TOBACCO SCREEN RCVD TLK: CPT | Performed by: FAMILY MEDICINE

## 2018-04-23 PROCEDURE — G8427 DOCREV CUR MEDS BY ELIG CLIN: HCPCS | Performed by: FAMILY MEDICINE

## 2018-04-23 PROCEDURE — 99214 OFFICE O/P EST MOD 30 MIN: CPT | Performed by: FAMILY MEDICINE

## 2018-04-23 RX ORDER — GABAPENTIN 600 MG/1
600 TABLET ORAL 3 TIMES DAILY
Qty: 90 TABLET | Refills: 2 | Status: SHIPPED | OUTPATIENT
Start: 2018-04-23 | End: 2018-07-16 | Stop reason: SDUPTHER

## 2018-04-23 RX ORDER — DULOXETIN HYDROCHLORIDE 60 MG/1
60 CAPSULE, DELAYED RELEASE ORAL DAILY
Qty: 30 CAPSULE | Refills: 3 | Status: CANCELLED | OUTPATIENT
Start: 2018-04-23

## 2018-04-23 RX ORDER — LOSARTAN POTASSIUM 50 MG/1
50 TABLET ORAL DAILY
Qty: 30 TABLET | Refills: 5 | Status: SHIPPED | OUTPATIENT
Start: 2018-04-23 | End: 2019-06-27 | Stop reason: ALTCHOICE

## 2018-04-23 RX ORDER — FLUOXETINE HYDROCHLORIDE 40 MG/1
40 CAPSULE ORAL DAILY
Qty: 30 CAPSULE | Refills: 5 | Status: SHIPPED | OUTPATIENT
Start: 2018-04-23 | End: 2018-10-22 | Stop reason: SDUPTHER

## 2018-04-23 RX ORDER — BUSPIRONE HYDROCHLORIDE 15 MG/1
15 TABLET ORAL 3 TIMES DAILY
Qty: 90 TABLET | Refills: 5 | Status: SHIPPED | OUTPATIENT
Start: 2018-04-23 | End: 2018-10-22 | Stop reason: SDUPTHER

## 2018-04-23 RX ORDER — INDAPAMIDE 1.25 MG/1
1.25 TABLET, FILM COATED ORAL EVERY MORNING
Qty: 30 TABLET | Refills: 5 | Status: SHIPPED | OUTPATIENT
Start: 2018-04-23 | End: 2018-10-16

## 2018-04-23 ASSESSMENT — ENCOUNTER SYMPTOMS
ABDOMINAL PAIN: 0
VOMITING: 0
WHEEZING: 0
CONSTIPATION: 0
SHORTNESS OF BREATH: 0
BACK PAIN: 1
DIARRHEA: 0
RHINORRHEA: 0
EYES NEGATIVE: 1
SINUS PRESSURE: 0
BLOOD IN STOOL: 0
NAUSEA: 0
SORE THROAT: 0
COUGH: 0
CHEST TIGHTNESS: 0

## 2018-05-28 DIAGNOSIS — J45.21 ASTHMATIC BRONCHITIS, MILD INTERMITTENT, WITH ACUTE EXACERBATION: ICD-10-CM

## 2018-07-16 ENCOUNTER — OFFICE VISIT (OUTPATIENT)
Dept: FAMILY MEDICINE CLINIC | Age: 38
End: 2018-07-16

## 2018-07-16 VITALS
DIASTOLIC BLOOD PRESSURE: 84 MMHG | SYSTOLIC BLOOD PRESSURE: 136 MMHG | BODY MASS INDEX: 33.09 KG/M2 | WEIGHT: 244 LBS | OXYGEN SATURATION: 98 % | RESPIRATION RATE: 16 BRPM | HEART RATE: 84 BPM

## 2018-07-16 DIAGNOSIS — F33.1 MODERATE EPISODE OF RECURRENT MAJOR DEPRESSIVE DISORDER (HCC): ICD-10-CM

## 2018-07-16 DIAGNOSIS — M54.12 CERVICAL RADICULOPATHY: Primary | ICD-10-CM

## 2018-07-16 DIAGNOSIS — F11.91 HISTORY OF HEROIN USE: ICD-10-CM

## 2018-07-16 DIAGNOSIS — G43.109 MIGRAINE WITH AURA AND WITHOUT STATUS MIGRAINOSUS, NOT INTRACTABLE: ICD-10-CM

## 2018-07-16 DIAGNOSIS — I10 ESSENTIAL HYPERTENSION: ICD-10-CM

## 2018-07-16 DIAGNOSIS — Z72.0 TOBACCO USE: ICD-10-CM

## 2018-07-16 DIAGNOSIS — J45.40 MODERATE PERSISTENT ASTHMATIC BRONCHITIS WITHOUT COMPLICATION: ICD-10-CM

## 2018-07-16 DIAGNOSIS — M54.50 ACUTE BILATERAL LOW BACK PAIN WITHOUT SCIATICA: ICD-10-CM

## 2018-07-16 PROBLEM — R10.9 ACUTE LEFT FLANK PAIN: Status: RESOLVED | Noted: 2018-03-27 | Resolved: 2018-07-16

## 2018-07-16 PROCEDURE — 99214 OFFICE O/P EST MOD 30 MIN: CPT | Performed by: FAMILY MEDICINE

## 2018-07-16 PROCEDURE — 4004F PT TOBACCO SCREEN RCVD TLK: CPT | Performed by: FAMILY MEDICINE

## 2018-07-16 PROCEDURE — G8417 CALC BMI ABV UP PARAM F/U: HCPCS | Performed by: FAMILY MEDICINE

## 2018-07-16 PROCEDURE — 96372 THER/PROPH/DIAG INJ SC/IM: CPT | Performed by: FAMILY MEDICINE

## 2018-07-16 PROCEDURE — G8427 DOCREV CUR MEDS BY ELIG CLIN: HCPCS | Performed by: FAMILY MEDICINE

## 2018-07-16 RX ORDER — TIZANIDINE 4 MG/1
4 TABLET ORAL EVERY 8 HOURS PRN
Qty: 30 TABLET | Refills: 2 | Status: SHIPPED | OUTPATIENT
Start: 2018-07-16 | End: 2018-10-16 | Stop reason: SDUPTHER

## 2018-07-16 RX ORDER — BETAMETHASONE SODIUM PHOSPHATE AND BETAMETHASONE ACETATE 3; 3 MG/ML; MG/ML
12 INJECTION, SUSPENSION INTRA-ARTICULAR; INTRALESIONAL; INTRAMUSCULAR; SOFT TISSUE ONCE
Status: COMPLETED | OUTPATIENT
Start: 2018-07-16 | End: 2018-07-16

## 2018-07-16 RX ORDER — PROPRANOLOL HCL 60 MG
60 CAPSULE, EXTENDED RELEASE 24HR ORAL DAILY
Qty: 30 CAPSULE | Refills: 3 | Status: SHIPPED | OUTPATIENT
Start: 2018-07-16 | End: 2018-11-20 | Stop reason: SDUPTHER

## 2018-07-16 RX ORDER — GABAPENTIN 600 MG/1
600 TABLET ORAL 3 TIMES DAILY
Qty: 90 TABLET | Refills: 2 | Status: SHIPPED | OUTPATIENT
Start: 2018-07-16 | End: 2018-10-16 | Stop reason: SDUPTHER

## 2018-07-16 RX ADMIN — BETAMETHASONE SODIUM PHOSPHATE AND BETAMETHASONE ACETATE 12 MG: 3; 3 INJECTION, SUSPENSION INTRA-ARTICULAR; INTRALESIONAL; INTRAMUSCULAR; SOFT TISSUE at 09:17

## 2018-07-16 ASSESSMENT — ENCOUNTER SYMPTOMS
RESPIRATORY NEGATIVE: 1
BACK PAIN: 1
EYES NEGATIVE: 1
GASTROINTESTINAL NEGATIVE: 1

## 2018-07-16 NOTE — LETTER
MEDICATION AGREEMENT     [facility]  1/8/9215      For certain conditions, multiple classes of medications may be used to help better manage your symptoms, and to improve your ability to function at home, work and in social settings. However, these medications do have risks, which will be discussed with you, including addiction and dependency. The following prescribed medications need frequent monitoring and will require you to partner and assist in your healthcare. Medication  Dose, instructions and quantity as indicated on current prescription bottle Diagnosis/Reason(s) for Taking Category   Gabapentin 600 mg tid   Back pain/headaches                            Benefits and goals of Controlled Substance Medications: There are two potential goals for your treatment: (1) decreased pain and suffering (2) improved daily life functions. There are many possible treatments for your chronic condition(s), and, in addition to controlled substance medications, we will try alternatives such as physical therapy, yoga, massage, home daily exercise, meditation, relaxation techniques, injections, chiropractic manipulations, surgery, cognitive therapy, hypnosis and many medications that are not habit-forming. Use of controlled substance medications may be helpful, but they are unlikely to resolve all of your symptoms or restore all function. Risks of Controlled Substance Medications:    Opioid pain medications: These medications can lead to problems such as addiction/dependence, sedation, lightheadedness/dizziness, memory issues, falls, constipation, nausea, or vomiting. They may also impair the ability to drive or operate machinery. Additionally, these medications may lower testosterone levels, leading to loss of bone strength, stamina and sex drive.   They may cause problems with breathing, sleep apnea and reduced coughing, which are especially dangerous for patients with lung supplements and oral decongestants. Dependence withdrawal symptoms may include depressed mood, loss of interest, suicidal thoughts, anxiety, fatigue, appetite changes and agitation. Testosterone replacement therapy:  Potential side effects include increased risk of stroke and heart attack, blood clots, increased blood pressure, increased cholesterol, enlarged prostate, sleep apnea, irritability/aggression and other mood disorders, and decreased fertility. Other:     1. I understand that I have the following responsibilities:  · I will take medications at the dose and frequency prescribed. · I will not increase or change how I take my medications without the approval of the health care provider who signs this Medication Agreement. · I will arrange for refills at the prescribed interval ONLY during regular office hours. I will not ask for refills earlier than agreed, after-hours, on holidays or on weekends. · I will obtain all refills for these medications at  ·  ____________________________________  pharmacy (phone number  ·  ________________________), with full consent for my provider and pharmacist to exchange information in writing or verbally. · I will not request any pain medications or controlled substances from other providers and will inform this provider of all other medications I am taking. · I will inform my other health care providers that I am taking these medications and of the existence of this [address]. In the event of an emergency, I will provide the same information to the emergency department providers. · I will protect my prescriptions and medications. I understand that lost or misplaced prescriptions will not be replaced. · I will keep medications only for my own use and will not share them with others. I will keep all medications away from children. · I agree to participate in any medical, psychological or psychiatric assessments recommended by my provider. · I will actively participate in any program designed to improve function, including social, physical, psychological and daily or work activities. 2. I will not use illegal or street drugs or another person's prescription. If I have an addiction problem with drugs or alcohol and my provider asks me to enter a program to address this issue, I agree to follow through. Such programs may include:  · 12-Step program and securing a sponsor  · Individual counseling   · Inpatient or outpatient treatment  · Other:_____________________________________________________________________________________________________________________________________________    If in treatment, I will request that a copy of the programs initial evaluation and treatment recommendations be sent to this provider and will not expect refills until that is received. I will also request written monthly updates be sent to this provider to verify my continuing treatment. 3. I will consent to drug screening upon my providers request to assure I am only taking the prescribed drugs, described in this MEDICATION AGREEMENT. I understand that a drug screen is a laboratory test in which a sample of my urine, blood or saliva is checked to see what drugs I have been taking. 4. I agree that I will treat the providers and staff at this office with respect at all times. I will keep all of my scheduled appointments, but if I need to cancel my appointment, I will do so a minimum of 24 hours before it is scheduled. 5. I understand that this provider may stop prescribing the medications listed if:  · I do not show any improvement in pain, or my activity has not improved. · I develop rapid tolerance or loss of improvement, as described in my treatment plan. · I develop significant side effects from the medication.   · My behavior is inconsistent with the responsibilities outlined above, which may also result in my being prevented from receiving further care from this office. · Other:____________________________________________________________________    AGREEMENT:    I have read the above and have had all of my questions answered. For chronic disease management, I know that my symptoms can be managed with many types of treatments. A chronic medication trial may be part of my treatment, but I must be an active participant in my care. Medication therapy is only one part of my symptom management plan. In some cases, there may be limited scientific evidence to support the chronic use of certain medications to improve symptoms and daily function. Furthermore, in certain circumstances, there may be scientific information that suggests that use of chronic controlled substances may actually worsen my symptoms and increase my risk of unintentional death directly related to this medication therapy. I know that if my provider feels my risk from controlled medications is greater than my benefit, I will have my controlled substance medication(s) compassionately lowered or removed altogether. I agree to a controlled substance medication trial.      I further agree to allow this office to contact family or friends if there are concerns about my safety and use of the controlled medications. I have agreed to use the following medications above as instructed by my physician and as stated in this [address].      Patient Signature:  ______________________  Date:7/16/2018 or _____________    Provider Signature:__________  __________  Date:7/16/2018 or _____________

## 2018-09-14 ENCOUNTER — OFFICE VISIT (OUTPATIENT)
Dept: FAMILY MEDICINE CLINIC | Age: 38
End: 2018-09-14

## 2018-09-14 VITALS
WEIGHT: 247 LBS | OXYGEN SATURATION: 97 % | BODY MASS INDEX: 34.45 KG/M2 | DIASTOLIC BLOOD PRESSURE: 72 MMHG | SYSTOLIC BLOOD PRESSURE: 118 MMHG | HEART RATE: 71 BPM

## 2018-09-14 DIAGNOSIS — M54.42 ACUTE LEFT-SIDED LOW BACK PAIN WITH LEFT-SIDED SCIATICA: Primary | ICD-10-CM

## 2018-09-14 PROCEDURE — 4004F PT TOBACCO SCREEN RCVD TLK: CPT | Performed by: NURSE PRACTITIONER

## 2018-09-14 PROCEDURE — G8417 CALC BMI ABV UP PARAM F/U: HCPCS | Performed by: NURSE PRACTITIONER

## 2018-09-14 PROCEDURE — G8427 DOCREV CUR MEDS BY ELIG CLIN: HCPCS | Performed by: NURSE PRACTITIONER

## 2018-09-14 PROCEDURE — 99213 OFFICE O/P EST LOW 20 MIN: CPT | Performed by: NURSE PRACTITIONER

## 2018-09-14 RX ORDER — IBUPROFEN 800 MG/1
800 TABLET ORAL EVERY 8 HOURS PRN
Qty: 90 TABLET | Refills: 0 | Status: SHIPPED | OUTPATIENT
Start: 2018-09-14 | End: 2018-10-16 | Stop reason: SDUPTHER

## 2018-09-14 ASSESSMENT — ENCOUNTER SYMPTOMS
RESPIRATORY NEGATIVE: 1
VOMITING: 0
WHEEZING: 0
SHORTNESS OF BREATH: 0
COUGH: 0
GASTROINTESTINAL NEGATIVE: 1
BACK PAIN: 1
DIARRHEA: 0
NAUSEA: 0

## 2018-09-14 NOTE — PROGRESS NOTES
that were prescribed in the emergency department. Add ibuprofen every 8 hours as needed for pain. Patient was advised to yoga on YouTube. He was given a sheet of exercises to do at home. Patient was advised to go home take his pain medication, steroids and ibuprofen today and rest.  He should begin doing stretching twice a day for 15 minutes each session beginning tomorrow. He was given a note to abstain from work today and he can return to work on Monday. If he is feeling no better or he is worsening symptoms he should follow up next week. If he is still having back pain in 6 weeks need to consider imaging.  - ibuprofen (IBU) 800 MG tablet; Take 1 tablet by mouth every 8 hours as needed for Pain  Dispense: 90 tablet; Refill: 0      Return if symptoms worsen or fail to improve, for With primary care provider. Controlled substance monitoring (if applicable to pt.  Visit)             (Please note that portions of this note may have been completed with a voice recognition program. Efforts were made to edit the dictations but occasionally words are mis-transcribed.)

## 2018-09-14 NOTE — PATIENT INSTRUCTIONS
Patient Education        Back Pain, Emergency or Urgent Symptoms: Care Instructions  Your Care Instructions    Many people have back pain at one time or another. In most cases, pain gets better with self-care that includes over-the-counter pain medicine, ice, heat, and exercises. Unless you have symptoms of a severe injury or heart attack, you may be able to give yourself a few days before you call a doctor. But some back problems are very serious. Do not ignore symptoms that need to be checked right away. Follow-up care is a key part of your treatment and safety. Be sure to make and go to all appointments, and call your doctor if you are having problems. It's also a good idea to know your test results and keep a list of the medicines you take. How can you care for yourself at home? · Sit or lie in positions that are most comfortable and that reduce your pain. Try one of these positions when you lie down:  ¨ Lie on your back with your knees bent and supported by large pillows. ¨ Lie on the floor with your legs on the seat of a sofa or chair. Eusebio Sender on your side with your knees and hips bent and a pillow between your legs. ¨ Lie on your stomach if it does not make pain worse. · Do not sit up in bed, and avoid soft couches and twisted positions. Bed rest can help relieve pain at first, but it delays healing. Avoid bed rest after the first day. · Change positions every 30 minutes. If you must sit for long periods of time, take breaks from sitting. Get up and walk around, or lie flat. · Try using a heating pad on a low or medium setting, for 15 to 20 minutes every 2 or 3 hours. Try a warm shower in place of one session with the heating pad. You can also buy single-use heat wraps that last up to 8 hours. You can also try ice or cold packs on your back for 10 to 20 minutes at a time, several times a day.  (Put a thin cloth between the ice pack and your skin.) This reduces pain and makes it easier to be active in the [facility] box to learn more about \"Back Pain, Emergency or Urgent Symptoms: Care Instructions. \"     If you do not have an account, please click on the \"Sign Up Now\" link. Current as of: November 20, 2017  Content Version: 11.7  © 7507-1865 [employer]. Care instructions adapted under license by [facility] ([facility]). If you have questions about a medical condition or this instruction, always ask your healthcare professional. [address] any warranty or liability for your use of this information. Patient Education        Therapeutic Ball: Back Exercises  Your Care Instructions  Here are some examples of typical exercises for your condition. Start each exercise slowly. Ease off the exercise if you start to have pain. Your doctor or physical therapist will tell you when you can start these exercises and which ones will work best for you. To prepare, make sure that your ball is the right size for you. When inflated and firm, it should allow you to sit with your hips and knees bent at about a 90-degree angle (like the letter L). How to do the exercises  Seated position on ball    1. Use this exercise to get used to moving on the ball and to find your best sitting position. 2. Sit comfortably on the ball with your feet about hip-width apart. If you feel unsteady, rest your hands on the ball near your hips. 3. As you do this exercise, try to keep your shoulders and upper body relaxed and still. 4. Using your stomach and back muscles to move your pelvis, roll the ball forward. This will round your back. 5. Still using your stomach and back muscles, roll the ball back. You will arch your back. 6. Repeat this rounding-arching motion a few times. 7. Stop in between the two positions, where your back is not rounded or arched. This is called your neutral position. Pelvic rotation    1. Sit tall on the ball. 2. Slowly rotate your hips in a [place] pattern.  Keep the stretch for at least 15 to 30 seconds. Do not arch your back, point your toes, or bend either knee. Keep one heel touching the floor and the other heel touching the wall. 17. Repeat with your other leg. 18. Do 2 to 4 times for each leg. Hip flexor stretch    15. Kneel on the floor with one knee bent and one leg behind you. Place your forward knee over your foot. Keep your other knee touching the floor. 16. Slowly push your hips forward until you feel a stretch in the upper thigh of your rear leg. 17. Hold the stretch for at least 15 to 30 seconds. Repeat with your other leg. 18. Do 2 to 4 times on each side. Wall sit    13. Stand with your back 10 to 12 inches away from a wall. 14. Lean into the wall until your back is flat against it. 15. Slowly slide down until your knees are slightly bent, pressing your lower back into the wall. 16. Hold for about 6 seconds, then slide back up the wall. 17. Repeat 8 to 12 times. Follow-up care is a key part of your treatment and safety. Be sure to make and go to all appointments, and call your doctor if you are having problems. It's also a good idea to know your test results and keep a list of the medicines you take. Where can you learn more? Go to [email or web address] and sign in to your [employer] account. Enter [address] in the [facility] box to learn more about \"Low Back Pain: Exercises. \"     If you do not have an account, please click on the \"Sign Up Now\" link. Current as of: November 29, 2017  Content Version: 11.7  © 3380-3294 [employer]. Care instructions adapted under license by [facility] ([facility]). If you have questions about a medical condition or this instruction, always ask your healthcare professional. [address] any warranty or liability for your use of this information.

## 2018-10-16 ENCOUNTER — OFFICE VISIT (OUTPATIENT)
Dept: FAMILY MEDICINE CLINIC | Age: 38
End: 2018-10-16
Payer: COMMERCIAL

## 2018-10-16 VITALS
BODY MASS INDEX: 34.59 KG/M2 | WEIGHT: 248 LBS | TEMPERATURE: 98.1 F | RESPIRATION RATE: 16 BRPM | OXYGEN SATURATION: 98 % | DIASTOLIC BLOOD PRESSURE: 78 MMHG | SYSTOLIC BLOOD PRESSURE: 130 MMHG | HEART RATE: 91 BPM

## 2018-10-16 DIAGNOSIS — M54.42 ACUTE LEFT-SIDED LOW BACK PAIN WITH LEFT-SIDED SCIATICA: ICD-10-CM

## 2018-10-16 DIAGNOSIS — J01.90 ACUTE BACTERIAL SINUSITIS: Primary | ICD-10-CM

## 2018-10-16 DIAGNOSIS — B96.89 ACUTE BACTERIAL SINUSITIS: Primary | ICD-10-CM

## 2018-10-16 DIAGNOSIS — G43.109 MIGRAINE WITH AURA AND WITHOUT STATUS MIGRAINOSUS, NOT INTRACTABLE: ICD-10-CM

## 2018-10-16 PROCEDURE — 99214 OFFICE O/P EST MOD 30 MIN: CPT | Performed by: FAMILY MEDICINE

## 2018-10-16 PROCEDURE — G8417 CALC BMI ABV UP PARAM F/U: HCPCS | Performed by: FAMILY MEDICINE

## 2018-10-16 PROCEDURE — G8484 FLU IMMUNIZE NO ADMIN: HCPCS | Performed by: FAMILY MEDICINE

## 2018-10-16 PROCEDURE — 96372 THER/PROPH/DIAG INJ SC/IM: CPT | Performed by: FAMILY MEDICINE

## 2018-10-16 PROCEDURE — 4004F PT TOBACCO SCREEN RCVD TLK: CPT | Performed by: FAMILY MEDICINE

## 2018-10-16 PROCEDURE — G8427 DOCREV CUR MEDS BY ELIG CLIN: HCPCS | Performed by: FAMILY MEDICINE

## 2018-10-16 RX ORDER — TIZANIDINE 4 MG/1
4 TABLET ORAL EVERY 8 HOURS PRN
Qty: 30 TABLET | Refills: 2 | Status: SHIPPED | OUTPATIENT
Start: 2018-10-16 | End: 2019-06-27 | Stop reason: ALTCHOICE

## 2018-10-16 RX ORDER — IBUPROFEN 800 MG/1
800 TABLET ORAL EVERY 8 HOURS PRN
Qty: 90 TABLET | Refills: 0 | Status: SHIPPED | OUTPATIENT
Start: 2018-10-16 | End: 2019-05-16 | Stop reason: SDUPTHER

## 2018-10-16 RX ORDER — AZITHROMYCIN 250 MG/1
TABLET, FILM COATED ORAL
Qty: 1 PACKET | Refills: 0 | Status: SHIPPED | OUTPATIENT
Start: 2018-10-16 | End: 2019-02-16 | Stop reason: ALTCHOICE

## 2018-10-16 RX ORDER — BETAMETHASONE SODIUM PHOSPHATE AND BETAMETHASONE ACETATE 3; 3 MG/ML; MG/ML
12 INJECTION, SUSPENSION INTRA-ARTICULAR; INTRALESIONAL; INTRAMUSCULAR; SOFT TISSUE ONCE
Status: COMPLETED | OUTPATIENT
Start: 2018-10-16 | End: 2018-10-16

## 2018-10-16 RX ORDER — GABAPENTIN 600 MG/1
600 TABLET ORAL 2 TIMES DAILY
Qty: 60 TABLET | Refills: 2 | Status: SHIPPED | OUTPATIENT
Start: 2018-10-16 | End: 2019-01-16 | Stop reason: SDUPTHER

## 2018-10-16 RX ADMIN — BETAMETHASONE SODIUM PHOSPHATE AND BETAMETHASONE ACETATE 12 MG: 3; 3 INJECTION, SUSPENSION INTRA-ARTICULAR; INTRALESIONAL; INTRAMUSCULAR; SOFT TISSUE at 16:27

## 2018-10-16 ASSESSMENT — ENCOUNTER SYMPTOMS
BLOOD IN STOOL: 0
DIARRHEA: 0
RHINORRHEA: 1
ABDOMINAL PAIN: 0
CONSTIPATION: 0
NAUSEA: 0
CHEST TIGHTNESS: 0
COUGH: 0
SHORTNESS OF BREATH: 0
BACK PAIN: 1
SINUS PRESSURE: 1
WHEEZING: 0
EYES NEGATIVE: 1
VOMITING: 0
SORE THROAT: 0

## 2018-10-22 DIAGNOSIS — F33.1 MODERATE EPISODE OF RECURRENT MAJOR DEPRESSIVE DISORDER (HCC): ICD-10-CM

## 2018-10-22 DIAGNOSIS — F41.9 ANXIETY: ICD-10-CM

## 2018-10-22 RX ORDER — FLUOXETINE HYDROCHLORIDE 40 MG/1
CAPSULE ORAL
Qty: 30 CAPSULE | Refills: 4 | Status: SHIPPED | OUTPATIENT
Start: 2018-10-22 | End: 2019-09-17 | Stop reason: SDUPTHER

## 2018-10-22 RX ORDER — BUSPIRONE HYDROCHLORIDE 15 MG/1
TABLET ORAL
Qty: 90 TABLET | Refills: 4 | Status: SHIPPED | OUTPATIENT
Start: 2018-10-22 | End: 2019-08-22

## 2018-10-26 ENCOUNTER — HOSPITAL ENCOUNTER (OUTPATIENT)
Dept: MRI IMAGING | Age: 38
Discharge: HOME | End: 2018-10-26
Payer: COMMERCIAL

## 2018-10-26 VITALS — WEIGHT: 243 LBS | BODY MASS INDEX: 33.89 KG/M2

## 2018-10-26 DIAGNOSIS — M54.42 ACUTE LEFT-SIDED LOW BACK PAIN WITH LEFT-SIDED SCIATICA: ICD-10-CM

## 2018-10-26 PROCEDURE — 72158 MRI LUMBAR SPINE W/O & W/DYE: CPT

## 2018-10-26 PROCEDURE — A9579 GAD-BASE MR CONTRAST NOS,1ML: HCPCS | Performed by: FAMILY MEDICINE

## 2018-10-26 PROCEDURE — 6360000004 HC RX CONTRAST MEDICATION: Performed by: FAMILY MEDICINE

## 2018-10-26 RX ADMIN — GADOTERIDOL 20 MMOL: 279.3 INJECTION, SOLUTION INTRAVENOUS at 17:43

## 2018-11-20 DIAGNOSIS — G43.109 MIGRAINE WITH AURA AND WITHOUT STATUS MIGRAINOSUS, NOT INTRACTABLE: ICD-10-CM

## 2018-11-20 RX ORDER — PROPRANOLOL HCL 60 MG
CAPSULE, EXTENDED RELEASE 24HR ORAL
Qty: 30 CAPSULE | Refills: 2 | Status: SHIPPED | OUTPATIENT
Start: 2018-11-20 | End: 2019-06-27 | Stop reason: ALTCHOICE

## 2019-02-16 ENCOUNTER — APPOINTMENT (OUTPATIENT)
Dept: GENERAL RADIOLOGY | Age: 39
End: 2019-02-16
Payer: COMMERCIAL

## 2019-02-16 ENCOUNTER — HOSPITAL ENCOUNTER (EMERGENCY)
Age: 39
Discharge: HOME | End: 2019-02-16
Attending: EMERGENCY MEDICINE
Payer: COMMERCIAL

## 2019-02-16 VITALS
DIASTOLIC BLOOD PRESSURE: 90 MMHG | HEIGHT: 72 IN | WEIGHT: 250 LBS | RESPIRATION RATE: 12 BRPM | TEMPERATURE: 98.7 F | HEART RATE: 82 BPM | OXYGEN SATURATION: 96 % | BODY MASS INDEX: 33.86 KG/M2 | SYSTOLIC BLOOD PRESSURE: 142 MMHG

## 2019-02-16 DIAGNOSIS — J40 BRONCHITIS: ICD-10-CM

## 2019-02-16 DIAGNOSIS — R05.9 COUGH: ICD-10-CM

## 2019-02-16 DIAGNOSIS — J06.9 UPPER RESPIRATORY TRACT INFECTION, UNSPECIFIED TYPE: Primary | ICD-10-CM

## 2019-02-16 PROCEDURE — 6370000000 HC RX 637 (ALT 250 FOR IP): Performed by: EMERGENCY MEDICINE

## 2019-02-16 PROCEDURE — 71046 X-RAY EXAM CHEST 2 VIEWS: CPT

## 2019-02-16 PROCEDURE — 99283 EMERGENCY DEPT VISIT LOW MDM: CPT

## 2019-02-16 PROCEDURE — 94640 AIRWAY INHALATION TREATMENT: CPT

## 2019-02-16 RX ORDER — AZITHROMYCIN 250 MG/1
TABLET, FILM COATED ORAL
Qty: 1 PACKET | Refills: 0 | Status: SHIPPED | OUTPATIENT
Start: 2019-02-16 | End: 2019-05-01 | Stop reason: ALTCHOICE

## 2019-02-16 RX ORDER — METHYLPREDNISOLONE 4 MG/1
TABLET ORAL
Qty: 1 KIT | Refills: 0 | Status: SHIPPED | OUTPATIENT
Start: 2019-02-16 | End: 2019-05-01 | Stop reason: ALTCHOICE

## 2019-02-16 RX ORDER — IPRATROPIUM BROMIDE AND ALBUTEROL SULFATE 2.5; .5 MG/3ML; MG/3ML
1 SOLUTION RESPIRATORY (INHALATION) ONCE
Status: COMPLETED | OUTPATIENT
Start: 2019-02-16 | End: 2019-02-16

## 2019-02-16 RX ORDER — PREDNISONE 20 MG/1
60 TABLET ORAL ONCE
Status: COMPLETED | OUTPATIENT
Start: 2019-02-16 | End: 2019-02-16

## 2019-02-16 RX ORDER — GUAIFENESIN/DEXTROMETHORPHAN 100-10MG/5
5 SYRUP ORAL 3 TIMES DAILY PRN
Qty: 120 ML | Refills: 0 | Status: SHIPPED | OUTPATIENT
Start: 2019-02-16 | End: 2019-02-26

## 2019-02-16 RX ORDER — IPRATROPIUM BROMIDE AND ALBUTEROL SULFATE 2.5; .5 MG/3ML; MG/3ML
1 SOLUTION RESPIRATORY (INHALATION) EVERY 6 HOURS PRN
Qty: 360 ML | Refills: 0 | Status: SHIPPED | OUTPATIENT
Start: 2019-02-16 | End: 2020-01-08 | Stop reason: CLARIF

## 2019-02-16 RX ORDER — GUAIFENESIN/DEXTROMETHORPHAN 100-10MG/5
10 SYRUP ORAL ONCE
Status: COMPLETED | OUTPATIENT
Start: 2019-02-16 | End: 2019-02-16

## 2019-02-16 RX ORDER — BENZONATATE 100 MG/1
200 CAPSULE ORAL ONCE
Status: COMPLETED | OUTPATIENT
Start: 2019-02-16 | End: 2019-02-16

## 2019-02-16 RX ORDER — BENZONATATE 100 MG/1
200 CAPSULE ORAL 3 TIMES DAILY PRN
Qty: 21 CAPSULE | Refills: 0 | Status: SHIPPED | OUTPATIENT
Start: 2019-02-16 | End: 2019-02-23

## 2019-02-16 RX ADMIN — BENZONATATE 200 MG: 100 CAPSULE ORAL at 07:10

## 2019-02-16 RX ADMIN — IPRATROPIUM BROMIDE AND ALBUTEROL SULFATE 1 AMPULE: .5; 3 SOLUTION RESPIRATORY (INHALATION) at 07:10

## 2019-02-16 RX ADMIN — GUAIFENESIN AND DEXTROMETHORPHAN 10 ML: 100; 10 SYRUP ORAL at 07:10

## 2019-02-16 RX ADMIN — PREDNISONE 60 MG: 20 TABLET ORAL at 07:47

## 2019-02-16 ASSESSMENT — PAIN DESCRIPTION - PAIN TYPE: TYPE: ACUTE PAIN

## 2019-02-16 ASSESSMENT — PAIN DESCRIPTION - FREQUENCY: FREQUENCY: CONTINUOUS

## 2019-02-16 ASSESSMENT — PAIN DESCRIPTION - DESCRIPTORS: DESCRIPTORS: BURNING

## 2019-02-16 ASSESSMENT — PAIN DESCRIPTION - LOCATION: LOCATION: CHEST

## 2019-02-16 ASSESSMENT — PAIN SCALES - GENERAL: PAINLEVEL_OUTOF10: 7

## 2019-04-11 ENCOUNTER — OFFICE VISIT (OUTPATIENT)
Dept: FAMILY MEDICINE CLINIC | Age: 39
End: 2019-04-11
Payer: COMMERCIAL

## 2019-04-11 VITALS
BODY MASS INDEX: 34.72 KG/M2 | RESPIRATION RATE: 16 BRPM | OXYGEN SATURATION: 98 % | SYSTOLIC BLOOD PRESSURE: 118 MMHG | HEART RATE: 82 BPM | WEIGHT: 256 LBS | DIASTOLIC BLOOD PRESSURE: 90 MMHG

## 2019-04-11 DIAGNOSIS — M54.42 CHRONIC LEFT-SIDED LOW BACK PAIN WITH LEFT-SIDED SCIATICA: ICD-10-CM

## 2019-04-11 DIAGNOSIS — F41.9 ANXIETY: ICD-10-CM

## 2019-04-11 DIAGNOSIS — Z63.79 STRESS DUE TO ILLNESS OF FAMILY MEMBER: Primary | ICD-10-CM

## 2019-04-11 DIAGNOSIS — G89.29 CHRONIC LEFT-SIDED LOW BACK PAIN WITH LEFT-SIDED SCIATICA: ICD-10-CM

## 2019-04-11 PROCEDURE — 99214 OFFICE O/P EST MOD 30 MIN: CPT | Performed by: FAMILY MEDICINE

## 2019-04-11 RX ORDER — GABAPENTIN 600 MG/1
600 TABLET ORAL 2 TIMES DAILY
Qty: 60 TABLET | Refills: 2 | Status: SHIPPED | OUTPATIENT
Start: 2019-04-11 | End: 2019-05-16 | Stop reason: SDUPTHER

## 2019-04-11 RX ORDER — ALPRAZOLAM 0.25 MG/1
0.25 TABLET ORAL 3 TIMES DAILY PRN
Qty: 30 TABLET | Refills: 0 | Status: SHIPPED | OUTPATIENT
Start: 2019-04-11 | End: 2019-05-11

## 2019-04-11 RX ORDER — DOXEPIN HYDROCHLORIDE 10 MG/1
10 CAPSULE ORAL NIGHTLY
Qty: 30 CAPSULE | Refills: 3 | Status: SHIPPED | OUTPATIENT
Start: 2019-04-11 | End: 2019-08-22

## 2019-04-11 ASSESSMENT — ENCOUNTER SYMPTOMS
COUGH: 0
WHEEZING: 0
NAUSEA: 0
CONSTIPATION: 0
BACK PAIN: 1
ABDOMINAL PAIN: 0
SINUS PRESSURE: 0
DIARRHEA: 0
SHORTNESS OF BREATH: 0
RHINORRHEA: 1
CHEST TIGHTNESS: 0
VOMITING: 0
EYES NEGATIVE: 1
SORE THROAT: 0
BLOOD IN STOOL: 0

## 2019-04-11 NOTE — PROGRESS NOTES
2019     Adrienne Atkins (:  1980) is a 37 y.o. male, here for evaluation of the following medical concerns:    Father coded this morning and was brought back but is in chf with renal failure and in ICU at [facility]. Pt heart racing unable to focus and went to medical at [facility] and they sent him here. Plan rtw on 2019        Review of Systems   Constitutional: Negative for activity change, chills, diaphoresis, fatigue and fever. HENT: Positive for rhinorrhea. Negative for congestion, dental problem, ear pain, sinus pressure and sore throat. Eyes: Negative. Negative for visual disturbance. Respiratory: Negative for cough, chest tightness, shortness of breath and wheezing. Cardiovascular: Negative for chest pain, palpitations and leg swelling. Gastrointestinal: Negative for abdominal pain, blood in stool, constipation, diarrhea, nausea and vomiting. Genitourinary: Negative for decreased urine volume, difficulty urinating, discharge, dysuria, flank pain, frequency, genital sores, hematuria, penile pain, penile swelling, scrotal swelling, testicular pain and urgency. Recurrent kidney stone, current flank pain   Musculoskeletal: Positive for back pain and neck pain. Negative for arthralgias and joint swelling. Foot pain   Skin: Negative for rash. Neurological: Positive for headaches. Negative for weakness and numbness. Psychiatric/Behavioral: Negative for decreased concentration, dysphoric mood, hallucinations, self-injury and suicidal ideas. The patient is not nervous/anxious and is not hyperactive. All other systems reviewed and are negative. Prior to Visit Medications    Medication Sig Taking?  Authorizing Provider   ipratropium-albuterol (DUONEB) 0.5-2.5 (3) MG/3ML SOLN nebulizer solution Inhale 3 mLs into the lungs every 6 hours as needed for Shortness of Breath  Alex Castaneda DO   Respiratory Therapy Supplies (NEBULIZER COMPRESSOR) KIT 1 kit by Does not apply route once for 1 dose  Benny Prabhakar DO   gabapentin (NEURONTIN) 600 MG tablet TAKE ONE TABLET BY MOUTH TWICE A DAY  Kay Vora MD   propranolol (INDERAL LA) 60 MG extended release capsule TAKE ONE CAPSULE BY MOUTH DAILY  Kay Vora MD   FLUoxetine (PROZAC) 40 MG capsule TAKE ONE CAPSULE BY MOUTH DAILY  Kay Vora MD   busPIRone (BUSPAR) 15 MG tablet TAKE ONE TABLET BY MOUTH THREE TIMES A DAY  Kay Vora MD   tiZANidine (ZANAFLEX) 4 MG tablet Take 1 tablet by mouth every 8 hours as needed (spasm)  Kay Vora MD   ibuprofen (IBU) 800 MG tablet Take 1 tablet by mouth every 8 hours as needed for Pain  Kay Vora MD   lidocaine (LIDODERM) 5 % Place 1 patch onto the skin daily 12 hours on, 12 hours off. Neha Cai MD   VENTOLIN  (90 Base) MCG/ACT inhaler INHALE TWO PUFFS BY MOUTH EVERY 4 HOURS AS NEEDED FOR WHEEZING  Kay Vora MD   losartan (COZAAR) 50 MG tablet Take 1 tablet by mouth daily  Kay Vora MD        Social History     Tobacco Use    Smoking status: Current Every Day Smoker     Packs/day: 0.50     Years: 7.00     Pack years: 3.50     Types: Cigarettes    Smokeless tobacco: Never Used   Substance Use Topics    Alcohol use: No     Comment: ha not drank for over a year        Vitals:    04/11/19 1117   BP: (!) 118/90   Site: Right Upper Arm   Position: Sitting   Cuff Size: Large Adult   Pulse: 82   Resp: 16   SpO2: 98%   Weight: 256 lb (116.1 kg)     Estimated body mass index is 34.72 kg/m² as calculated from the following:    Height as of 2/16/19: 6' (1.829 m). Weight as of this encounter: 256 lb (116.1 kg). Physical Exam    ASSESSMENT/PLAN:  1. left-sided low back pain with left-sided sciatica  Better with neurontin  - gabapentin (NEURONTIN) 600 MG tablet; Take 1 tablet by mouth 2 times daily for 90 days. Dispense: 60 tablet; Refill: 2    2.  Stress due to illness of family member  Short term xanax  - ALPRAZolam Paige Buitrago) 0.25 MG tablet; Take 1 tablet by mouth 3 times daily as needed for Anxiety for up to 30 days. Dispense: 30 tablet; Refill: 0  - doxepin (SINEQUAN) 10 MG capsule; Take 1 capsule by mouth nightly  Dispense: 30 capsule; Refill: 3    3. Anxiety  Work note completed  - ALPRAZolam (XANAX) 0.25 MG tablet; Take 1 tablet by mouth 3 times daily as needed for Anxiety for up to 30 days. Dispense: 30 tablet; Refill: 0  - doxepin (SINEQUAN) 10 MG capsule; Take 1 capsule by mouth nightly  Dispense: 30 capsule; Refill: 3      Return in about 3 months (around 7/11/2019), or if symptoms worsen or fail to improve. An electronic signature was used to authenticate this note.     --Karl Palma MD on 4/11/2019 at 3:49 PM

## 2019-05-01 ENCOUNTER — OFFICE VISIT (OUTPATIENT)
Dept: FAMILY MEDICINE CLINIC | Age: 39
End: 2019-05-01
Payer: COMMERCIAL

## 2019-05-01 VITALS
DIASTOLIC BLOOD PRESSURE: 90 MMHG | HEIGHT: 72 IN | RESPIRATION RATE: 20 BRPM | SYSTOLIC BLOOD PRESSURE: 136 MMHG | HEART RATE: 64 BPM | BODY MASS INDEX: 34.57 KG/M2 | WEIGHT: 255.2 LBS

## 2019-05-01 DIAGNOSIS — F41.9 ANXIETY: Primary | ICD-10-CM

## 2019-05-01 DIAGNOSIS — F33.1 MODERATE EPISODE OF RECURRENT MAJOR DEPRESSIVE DISORDER (HCC): ICD-10-CM

## 2019-05-01 PROCEDURE — 99213 OFFICE O/P EST LOW 20 MIN: CPT | Performed by: PHYSICIAN ASSISTANT

## 2019-05-01 ASSESSMENT — ENCOUNTER SYMPTOMS
DIARRHEA: 0
NAUSEA: 0
ABDOMINAL PAIN: 0
COUGH: 0
VOMITING: 0

## 2019-05-01 ASSESSMENT — PATIENT HEALTH QUESTIONNAIRE - PHQ9
SUM OF ALL RESPONSES TO PHQ QUESTIONS 1-9: 0
2. FEELING DOWN, DEPRESSED OR HOPELESS: 0
1. LITTLE INTEREST OR PLEASURE IN DOING THINGS: 0
SUM OF ALL RESPONSES TO PHQ QUESTIONS 1-9: 0
SUM OF ALL RESPONSES TO PHQ9 QUESTIONS 1 & 2: 0

## 2019-05-01 NOTE — PROGRESS NOTES
Cigarettes    Smokeless tobacco: Never Used   Substance and Sexual Activity    Alcohol use: No     Comment: ha not drank for over a year    Drug use: No     Types: Cocaine, IV, Other-see comments     Comment: denies any use since Feb 2015    Sexual activity: Yes   Lifestyle    Physical activity:     Days per week: None     Minutes per session: None    Stress: None   Relationships    Social connections:     Talks on phone: None     Gets together: None     Attends Buddhism service: None     Active member of club or organization: None     Attends meetings of clubs or organizations: None     Relationship status: None    Intimate partner violence:     Fear of current or ex partner: None     Emotionally abused: None     Physically abused: None     Forced sexual activity: None   Other Topics Concern    None   Social History Narrative    None       Review of Systems   Constitutional: Positive for fatigue. Negative for chills and fever. Respiratory: Negative for cough. Cardiovascular: Negative for chest pain. Gastrointestinal: Negative for abdominal pain, diarrhea, nausea and vomiting. Skin: Negative for rash. Hematological: Negative for adenopathy. Psychiatric/Behavioral: Negative for dysphoric mood, self-injury, sleep disturbance and suicidal ideas. The patient is nervous/anxious. OBJECTIVE:    BP (!) 136/90 (Site: Right Upper Arm, Position: Sitting, Cuff Size: Medium Adult)   Pulse 64   Resp 20   Ht 6' (1.829 m)   Wt 255 lb 3.2 oz (115.8 kg)   BMI 34.61 kg/m²     Physical Exam    ASSESSMENT/PLAN:    Problem List        Other    Anxiety - Primary     Pt having a hard time managing symptoms at this time, taking buspar/on prozac/using xanax prn but this is causing severe somnolence. Pt would like time off of work if possible. Discussed taking a week at this time-return next Thursday. Recheck in 2 weeks, sooner if worse.   Ok to cut xanax in half to use prn         Relevant Medications

## 2019-05-01 NOTE — ASSESSMENT & PLAN NOTE
Pt having a hard time managing symptoms at this time, taking buspar/on prozac/using xanax prn but this is causing severe somnolence. Pt would like time off of work if possible. Discussed taking a week at this time-return next Thursday. Recheck in 2 weeks, sooner if worse.   Ok to cut xanax in half to use prn

## 2019-05-02 ENCOUNTER — TELEPHONE (OUTPATIENT)
Dept: FAMILY MEDICINE CLINIC | Age: 39
End: 2019-05-02

## 2019-05-02 NOTE — TELEPHONE ENCOUNTER
Advised patient this date that Alba Castillo had discussed and provided paperwork for signature from employer to Dr. Paula Roe. Advised we will contact if there are any issues or when the form is completed/signed. Patient agreed w/POC.

## 2019-05-03 NOTE — TELEPHONE ENCOUNTER
Pt called in checking on forms. Spoke with PCP who states Dr. Andrews Melendez still has forms to look over and discuss with PCP. Informed Pt we will call when they are ready. Pt would like us to call him at [phone].

## 2019-05-16 ENCOUNTER — OFFICE VISIT (OUTPATIENT)
Dept: FAMILY MEDICINE CLINIC | Age: 39
End: 2019-05-16
Payer: COMMERCIAL

## 2019-05-16 VITALS
BODY MASS INDEX: 34.46 KG/M2 | WEIGHT: 254.4 LBS | SYSTOLIC BLOOD PRESSURE: 138 MMHG | HEIGHT: 72 IN | RESPIRATION RATE: 18 BRPM | DIASTOLIC BLOOD PRESSURE: 88 MMHG | HEART RATE: 76 BPM

## 2019-05-16 DIAGNOSIS — I10 ESSENTIAL HYPERTENSION: ICD-10-CM

## 2019-05-16 DIAGNOSIS — F41.9 ANXIETY: ICD-10-CM

## 2019-05-16 DIAGNOSIS — G57.93 NEUROPATHY OF BOTH FEET: Primary | ICD-10-CM

## 2019-05-16 DIAGNOSIS — M54.42 CHRONIC LEFT-SIDED LOW BACK PAIN WITH LEFT-SIDED SCIATICA: ICD-10-CM

## 2019-05-16 DIAGNOSIS — G89.29 CHRONIC LEFT-SIDED LOW BACK PAIN WITH LEFT-SIDED SCIATICA: ICD-10-CM

## 2019-05-16 PROCEDURE — 99214 OFFICE O/P EST MOD 30 MIN: CPT | Performed by: PHYSICIAN ASSISTANT

## 2019-05-16 RX ORDER — IBUPROFEN 800 MG/1
800 TABLET ORAL EVERY 8 HOURS PRN
Qty: 90 TABLET | Refills: 5 | Status: SHIPPED | OUTPATIENT
Start: 2019-05-16 | End: 2019-06-27

## 2019-05-16 RX ORDER — GABAPENTIN 600 MG/1
600 TABLET ORAL 3 TIMES DAILY
Qty: 90 TABLET | Refills: 2 | Status: SHIPPED | OUTPATIENT
Start: 2019-05-16 | End: 2019-08-22 | Stop reason: SDUPTHER

## 2019-05-16 RX ORDER — ALPRAZOLAM 0.25 MG/1
0.25 TABLET ORAL 3 TIMES DAILY PRN
Qty: 90 TABLET | Refills: 0 | Status: SHIPPED | OUTPATIENT
Start: 2019-05-16 | End: 2019-06-15

## 2019-05-16 ASSESSMENT — ENCOUNTER SYMPTOMS
NAUSEA: 0
BACK PAIN: 1
BLOOD IN STOOL: 0
DIARRHEA: 0
SHORTNESS OF BREATH: 0
ABDOMINAL PAIN: 0
COUGH: 0
VOMITING: 0

## 2019-05-16 ASSESSMENT — PATIENT HEALTH QUESTIONNAIRE - PHQ9
SUM OF ALL RESPONSES TO PHQ QUESTIONS 1-9: 0
SUM OF ALL RESPONSES TO PHQ9 QUESTIONS 1 & 2: 0
SUM OF ALL RESPONSES TO PHQ QUESTIONS 1-9: 0
2. FEELING DOWN, DEPRESSED OR HOPELESS: 0
1. LITTLE INTEREST OR PLEASURE IN DOING THINGS: 0

## 2019-05-16 NOTE — PROGRESS NOTES
Andrescathy Castro  1980  37 y.o.  male    SUBJECTIVE:    Chief Complaint   Patient presents with    2 Week Follow-Up    Medication Refill     gabapentin, has been on for years now and it seems to not be helping his feet as much as it used to       HPI   Anxiety-father recently coded from CHF/renal failure last month, has been in and out for hospital/ECF. Just discharged home today from [facility]. Pt's wife just had extensive back surgery last week. Pt states his stress level has been \"off the charts\". Having panic attacks where he feels overwhelmed/sob/palpiations. Has been using 1/2 tab-1 tab xanax prn with good results. Has been referred to psychologist through employer. Neuropathy kelly feet-chronic, has been on neurontin \"for years\". Does not feel it is as effective as it has been in past, pt is noticing some increasing pain/intensity of symptoms. Back pain-chronic, has used ibuprofen in past with moderate to good results. Would like refill if possible. Pt works in [employer]. on concrete floors and back aches almost daily by end of day    HTN-stable, The patient is taking hypertensive medications compliantly without side effects. Denies chest pain, dyspnea, edema, or TIA's.       PHQ-9 Total Score: 0 (5/16/2019  4:14 PM)    Current Outpatient Medications on File Prior to Visit   Medication Sig Dispense Refill    doxepin (SINEQUAN) 10 MG capsule Take 1 capsule by mouth nightly 30 capsule 3    ipratropium-albuterol (DUONEB) 0.5-2.5 (3) MG/3ML SOLN nebulizer solution Inhale 3 mLs into the lungs every 6 hours as needed for Shortness of Breath 360 mL 0    propranolol (INDERAL LA) 60 MG extended release capsule TAKE ONE CAPSULE BY MOUTH DAILY 30 capsule 2    FLUoxetine (PROZAC) 40 MG capsule TAKE ONE CAPSULE BY MOUTH DAILY 30 capsule 4    busPIRone (BUSPAR) 15 MG tablet TAKE ONE TABLET BY MOUTH THREE TIMES A DAY 90 tablet 4    tiZANidine (ZANAFLEX) 4 MG tablet Take 1 tablet by mouth every 8 hours as needed (spasm) 30 tablet 2    lidocaine (LIDODERM) 5 % Place 1 patch onto the skin daily 12 hours on, 12 hours off. 30 patch 0    VENTOLIN  (90 Base) MCG/ACT inhaler INHALE TWO PUFFS BY MOUTH EVERY 4 HOURS AS NEEDED FOR WHEEZING 3 Inhaler 3    losartan (COZAAR) 50 MG tablet Take 1 tablet by mouth daily 30 tablet 5    Respiratory Therapy Supplies (NEBULIZER COMPRESSOR) KIT 1 kit by Does not apply route once for 1 dose 1 kit 0     No current facility-administered medications on file prior to visit. Review of PMH, PSH, Family Hx, Allergies and updates made as needed.     Allergies   Allergen Reactions    Ceclor [Cefaclor] Anaphylaxis     trouble breathing\"    Cephalexin Anaphylaxis     \"trouble breathing\"    Penicillins Anaphylaxis     \"can not breathe and swell up\"    Tetracyclines & Related Anaphylaxis     \"trouble breathing\"    Chantix [Varenicline]      Hostile towards wife    Phenergan [Promethazine Hcl] Other (See Comments)     Mood altering    Lisinopril Other (See Comments)     Throat \"irritated\"       Past Medical History:   Diagnosis Date    Abnormal liver function tests     scheduled for liver bx 10/4/2013    Anxiety     Asthma     Chronic abdominal pain     Depression     FAP (familial adenomatous polyposis)     family hx of this disorder per h&p    Hepatitis C 2006    Hypertension     Kidney stone        Past Surgical History:   Procedure Laterality Date    APPENDECTOMY  age 14    COLONOSCOPY  9/23/13    int hem    ENDOSCOPY, COLON, DIAGNOSTIC  11/07/2014    gastritis,hiatal hernia    KIDNEY STONE SURGERY      LIVER BIOPSY  7 yr ago   [facility]IVER BIOPSY  10/04/2013    TONSILLECTOMY  as a kid       Social History     Socioeconomic History    Marital status:      Spouse name: None    Number of children: None    Years of education: None    Highest education level: None   Occupational History    None   Social Needs    Financial resource strain: None    Food insecurity: Worry: None     Inability: None    Transportation needs:     Medical: None     Non-medical: None   Tobacco Use    Smoking status: Current Every Day Smoker     Packs/day: 0.50     Years: 7.00     Pack years: 3.50     Types: Cigarettes    Smokeless tobacco: Never Used   Substance and Sexual Activity    Alcohol use: No     Comment: ha not drank for over a year    Drug use: No     Types: Cocaine, IV, Other-see comments     Comment: denies any use since Feb 2015    Sexual activity: Yes   Lifestyle    Physical activity:     Days per week: None     Minutes per session: None    Stress: None   Relationships    Social connections:     Talks on phone: None     Gets together: None     Attends Caodaism service: None     Active member of club or organization: None     Attends meetings of clubs or organizations: None     Relationship status: None    Intimate partner violence:     Fear of current or ex partner: None     Emotionally abused: None     Physically abused: None     Forced sexual activity: None   Other Topics Concern    None   Social History Narrative    None       Review of Systems   Constitutional: Negative for chills, fatigue, fever and unexpected weight change. HENT: Negative. Respiratory: Negative for cough and shortness of breath. Cardiovascular: Positive for palpitations. Negative for chest pain. Gastrointestinal: Negative for abdominal pain, blood in stool, diarrhea, nausea and vomiting. Endocrine: Negative. Musculoskeletal: Positive for back pain and neck pain. Chano foot pain   Skin: Negative for rash. Neurological: Positive for headaches. Negative for light-headedness. Hematological: Negative for adenopathy. Psychiatric/Behavioral: Negative for dysphoric mood, sleep disturbance and suicidal ideas. The patient is nervous/anxious.         OBJECTIVE:    /88 (Site: Left Upper Arm, Position: Sitting, Cuff Size: Medium Adult)   Pulse 76   Resp 18   Ht 6' (1.829 m) Wt 254 lb 6.4 oz (115.4 kg)   BMI 34.50 kg/m²     Physical Exam   Constitutional: He is oriented to person, place, and time. He appears well-developed and well-nourished. No distress. HENT:   Head: Normocephalic. Right Ear: External ear normal.   Left Ear: External ear normal.   Nose: Nose normal.   Eyes: Conjunctivae and EOM are normal.   Neck: Normal range of motion. Neck supple. Cardiovascular: Normal rate, regular rhythm and normal heart sounds. Pulmonary/Chest: Effort normal and breath sounds normal.   Musculoskeletal:        Lumbar back: He exhibits pain. Right foot: There is tenderness. Left foot: There is tenderness. Neurological: He is alert and oriented to person, place, and time. Skin: Skin is warm and dry. Psychiatric: He has a normal mood and affect.  His behavior is normal. Judgment and thought content normal.       ASSESSMENT/PLAN:    Problem List        Circulatory    HTN (hypertension)     Continue current meds  Losartan 50 mg  Propranolol 60 mg ER          Relevant Medications    losartan (COZAAR) 50 MG tablet    propranolol (INDERAL LA) 60 MG extended release capsule       Nervous and Auditory    Neuropathy of both feet - Primary     Will increase gabapentin to 600 mg tid from bid  Risks/benefits/SE reviewed, pt voices understanding  Recheck in 3 months-sooner prn         Relevant Medications    tiZANidine (ZANAFLEX) 4 MG tablet    FLUoxetine (PROZAC) 40 MG capsule    busPIRone (BUSPAR) 15 MG tablet    doxepin (SINEQUAN) 10 MG capsule    gabapentin (NEURONTIN) 600 MG tablet    ALPRAZolam (XANAX) 0.25 MG tablet    Chronic left-sided low back pain with left-sided sciatica     Refill ibuprofen Risks/benefits/SE reviewed, pt voices understanding           Relevant Medications    FLUoxetine (PROZAC) 40 MG capsule    busPIRone (BUSPAR) 15 MG tablet    predniSONE (DELTASONE) tablet 60 mg (Completed)    doxepin (SINEQUAN) 10 MG capsule    gabapentin (NEURONTIN) 600 MG tablet ALPRAZolam (XANAX) 0.25 MG tablet    ibuprofen (IBU) 800 MG tablet       Other    Anxiety     Refill xanax, Risks/benefits/SE reviewed, pt voices understanding  OARRS reviewed, discussed with pt this should only be used for short period of time, if he needs any further refills then will start decreasing dosing-voices  understanding         Relevant Medications    FLUoxetine (PROZAC) 40 MG capsule    busPIRone (BUSPAR) 15 MG tablet    doxepin (SINEQUAN) 10 MG capsule    ALPRAZolam (XANAX) 0.25 MG tablet          Attestation: The Prescription Monitoring Report for this patient was reviewed today. Lucy Weinberg PA-C)  Chronic Pain Routine Monitoring: Possible medication side effects, risk of tolerance/dependence & alternative treatments discussed., No signs of potential drug abuse or diversion identified: otherwise, see note documentation Lucy Weinberg PA-C)    Return in about 3 months (around 8/16/2019).

## 2019-05-16 NOTE — ASSESSMENT & PLAN NOTE
Refill xanax, Risks/benefits/SE reviewed, pt voices understanding  OARRS reviewed, discussed with pt this should only be used for short period of time, if he needs any further refills then will start decreasing dosing-voices  understanding

## 2019-05-16 NOTE — ASSESSMENT & PLAN NOTE
Will increase gabapentin to 600 mg tid from bid  Risks/benefits/SE reviewed, pt voices understanding  Recheck in 3 months-sooner prn

## 2019-06-27 ENCOUNTER — APPOINTMENT (OUTPATIENT)
Dept: CT IMAGING | Age: 39
End: 2019-06-27
Payer: COMMERCIAL

## 2019-06-27 ENCOUNTER — HOSPITAL ENCOUNTER (EMERGENCY)
Age: 39
Discharge: HOME | End: 2019-06-27
Attending: EMERGENCY MEDICINE
Payer: COMMERCIAL

## 2019-06-27 VITALS
SYSTOLIC BLOOD PRESSURE: 136 MMHG | RESPIRATION RATE: 16 BRPM | TEMPERATURE: 97.7 F | BODY MASS INDEX: 33.86 KG/M2 | OXYGEN SATURATION: 96 % | HEIGHT: 72 IN | HEART RATE: 71 BPM | WEIGHT: 250 LBS | DIASTOLIC BLOOD PRESSURE: 84 MMHG

## 2019-06-27 DIAGNOSIS — N20.0 KIDNEY STONE: Primary | ICD-10-CM

## 2019-06-27 LAB
ALBUMIN SERPL-MCNC: 3.7 GM/DL (ref 3.4–5)
ALP BLD-CCNC: 80 IU/L (ref 40–129)
ALT SERPL-CCNC: 21 U/L (ref 10–40)
ANION GAP SERPL CALCULATED.3IONS-SCNC: 10 MMOL/L (ref 4–16)
AST SERPL-CCNC: 5 IU/L (ref 15–37)
BACTERIA: NEGATIVE /HPF
BASOPHILS ABSOLUTE: 0 K/CU MM
BASOPHILS RELATIVE PERCENT: 0.2 % (ref 0–1)
BILIRUB SERPL-MCNC: 0.2 MG/DL (ref 0–1)
BILIRUBIN URINE: NEGATIVE MG/DL
BLOOD, URINE: ABNORMAL
BUN BLDV-MCNC: 23 MG/DL (ref 6–23)
CALCIUM SERPL-MCNC: 9.3 MG/DL (ref 8.3–10.6)
CAST TYPE: ABNORMAL /HPF
CHLORIDE BLD-SCNC: 108 MMOL/L (ref 99–110)
CLARITY: CLEAR
CO2: 25 MMOL/L (ref 21–32)
COLOR: YELLOW
CREAT SERPL-MCNC: 1.1 MG/DL (ref 0.9–1.3)
CRYSTAL TYPE: ABNORMAL /HPF
DIFFERENTIAL TYPE: ABNORMAL
EOSINOPHILS ABSOLUTE: 0.2 K/CU MM
EOSINOPHILS RELATIVE PERCENT: 1.4 % (ref 0–3)
EPITHELIAL CELLS, UA: ABNORMAL /HPF
GFR AFRICAN AMERICAN: >60 ML/MIN/1.73M2
GFR NON-AFRICAN AMERICAN: >60 ML/MIN/1.73M2
GLUCOSE BLD-MCNC: 137 MG/DL (ref 70–99)
GLUCOSE, URINE: NEGATIVE MG/DL
HCT VFR BLD CALC: 46.9 % (ref 42–52)
HEMOGLOBIN: 15.3 GM/DL (ref 13.5–18)
IMMATURE NEUTROPHIL %: 0.7 % (ref 0–0.43)
KETONES, URINE: NEGATIVE MG/DL
LEUKOCYTE ESTERASE, URINE: NEGATIVE
LIPASE: 26 IU/L (ref 13–60)
LYMPHOCYTES ABSOLUTE: 4.6 K/CU MM
LYMPHOCYTES RELATIVE PERCENT: 37.9 % (ref 24–44)
MCH RBC QN AUTO: 28.9 PG (ref 27–31)
MCHC RBC AUTO-ENTMCNC: 32.6 % (ref 32–36)
MCV RBC AUTO: 88.7 FL (ref 78–100)
MONOCYTES ABSOLUTE: 0.9 K/CU MM
MONOCYTES RELATIVE PERCENT: 7.7 % (ref 0–4)
MUCUS: NEGATIVE HPF
NITRITE URINE, QUANTITATIVE: NEGATIVE
PDW BLD-RTO: 14.5 % (ref 11.7–14.9)
PH, URINE: 6 (ref 5–8)
PLATELET # BLD: 234 K/CU MM (ref 140–440)
PMV BLD AUTO: 9.7 FL (ref 7.5–11.1)
POTASSIUM SERPL-SCNC: 3.9 MMOL/L (ref 3.5–5.1)
PROTEIN UA: NEGATIVE MG/DL
RBC # BLD: 5.29 M/CU MM (ref 4.6–6.2)
RBC URINE: ABNORMAL /HPF (ref 0–3)
SEGMENTED NEUTROPHILS ABSOLUTE COUNT: 6.3 K/CU MM
SEGMENTED NEUTROPHILS RELATIVE PERCENT: 52.1 % (ref 36–66)
SODIUM BLD-SCNC: 143 MMOL/L (ref 135–145)
SPECIFIC GRAVITY UA: 1.02 (ref 1–1.03)
TOTAL IMMATURE NEUTOROPHIL: 0.08 K/CU MM
TOTAL PROTEIN: 6.6 GM/DL (ref 6.4–8.2)
TROPONIN T: <0.01 NG/ML
UROBILINOGEN, URINE: 0.2 MG/DL (ref 0.2–1)
VOLUME, (UVOL): 12 ML (ref 10–12)
WBC # BLD: 12.2 K/CU MM (ref 4–10.5)
WBC UA: ABNORMAL /HPF (ref 0–2)

## 2019-06-27 PROCEDURE — 80053 COMPREHEN METABOLIC PANEL: CPT

## 2019-06-27 PROCEDURE — 6370000000 HC RX 637 (ALT 250 FOR IP): Performed by: EMERGENCY MEDICINE

## 2019-06-27 PROCEDURE — 84484 ASSAY OF TROPONIN QUANT: CPT

## 2019-06-27 PROCEDURE — 6360000002 HC RX W HCPCS: Performed by: EMERGENCY MEDICINE

## 2019-06-27 PROCEDURE — 83690 ASSAY OF LIPASE: CPT

## 2019-06-27 PROCEDURE — 96374 THER/PROPH/DIAG INJ IV PUSH: CPT

## 2019-06-27 PROCEDURE — 74176 CT ABD & PELVIS W/O CONTRAST: CPT

## 2019-06-27 PROCEDURE — 93005 ELECTROCARDIOGRAM TRACING: CPT | Performed by: EMERGENCY MEDICINE

## 2019-06-27 PROCEDURE — 81001 URINALYSIS AUTO W/SCOPE: CPT

## 2019-06-27 PROCEDURE — 85025 COMPLETE CBC W/AUTO DIFF WBC: CPT

## 2019-06-27 PROCEDURE — 2580000003 HC RX 258: Performed by: EMERGENCY MEDICINE

## 2019-06-27 PROCEDURE — 99284 EMERGENCY DEPT VISIT MOD MDM: CPT

## 2019-06-27 RX ORDER — KETOROLAC TROMETHAMINE 30 MG/ML
30 INJECTION, SOLUTION INTRAMUSCULAR; INTRAVENOUS ONCE
Status: COMPLETED | OUTPATIENT
Start: 2019-06-27 | End: 2019-06-27

## 2019-06-27 RX ORDER — IBUPROFEN 600 MG/1
600 TABLET ORAL 4 TIMES DAILY PRN
Qty: 360 TABLET | Refills: 1 | Status: SHIPPED | OUTPATIENT
Start: 2019-06-27 | End: 2020-01-08 | Stop reason: SDUPTHER

## 2019-06-27 RX ORDER — HYDROCODONE BITARTRATE AND ACETAMINOPHEN 5; 325 MG/1; MG/1
1 TABLET ORAL EVERY 6 HOURS PRN
Qty: 10 TABLET | Refills: 0 | Status: SHIPPED | OUTPATIENT
Start: 2019-06-27 | End: 2019-06-30

## 2019-06-27 RX ORDER — TAMSULOSIN HYDROCHLORIDE 0.4 MG/1
0.4 CAPSULE ORAL DAILY
Qty: 10 CAPSULE | Refills: 3 | Status: SHIPPED | OUTPATIENT
Start: 2019-06-27 | End: 2019-08-22 | Stop reason: ALTCHOICE

## 2019-06-27 RX ORDER — HYDROCODONE BITARTRATE AND ACETAMINOPHEN 5; 325 MG/1; MG/1
1 TABLET ORAL ONCE
Status: COMPLETED | OUTPATIENT
Start: 2019-06-27 | End: 2019-06-27

## 2019-06-27 RX ORDER — SODIUM CHLORIDE 0.9 % (FLUSH) 0.9 %
10 SYRINGE (ML) INJECTION PRN
Status: DISCONTINUED | OUTPATIENT
Start: 2019-06-27 | End: 2019-06-27 | Stop reason: HOSPADM

## 2019-06-27 RX ORDER — ONDANSETRON 4 MG/1
4 TABLET, ORALLY DISINTEGRATING ORAL EVERY 8 HOURS PRN
Qty: 15 TABLET | Refills: 0 | Status: SHIPPED | OUTPATIENT
Start: 2019-06-27 | End: 2019-08-22 | Stop reason: ALTCHOICE

## 2019-06-27 RX ADMIN — SODIUM CHLORIDE, PRESERVATIVE FREE 10 ML: 5 INJECTION INTRAVENOUS at 12:14

## 2019-06-27 RX ADMIN — HYDROCODONE BITARTRATE AND ACETAMINOPHEN 1 TABLET: 5; 325 TABLET ORAL at 13:08

## 2019-06-27 RX ADMIN — KETOROLAC TROMETHAMINE 30 MG: 30 INJECTION, SOLUTION INTRAMUSCULAR at 12:11

## 2019-06-27 RX ADMIN — SODIUM CHLORIDE, PRESERVATIVE FREE 10 ML: 5 INJECTION INTRAVENOUS at 12:05

## 2019-06-27 ASSESSMENT — PAIN DESCRIPTION - LOCATION: LOCATION: FLANK

## 2019-06-27 ASSESSMENT — PAIN DESCRIPTION - ORIENTATION: ORIENTATION: LEFT

## 2019-06-27 ASSESSMENT — PAIN SCALES - GENERAL
PAINLEVEL_OUTOF10: 7
PAINLEVEL_OUTOF10: 10
PAINLEVEL_OUTOF10: 10
PAINLEVEL_OUTOF10: 5

## 2019-06-27 ASSESSMENT — PAIN DESCRIPTION - PROGRESSION: CLINICAL_PROGRESSION: GRADUALLY WORSENING

## 2019-06-27 ASSESSMENT — PAIN DESCRIPTION - ONSET: ONSET: SUDDEN

## 2019-06-27 ASSESSMENT — PAIN DESCRIPTION - PAIN TYPE
TYPE: ACUTE PAIN
TYPE: ACUTE PAIN

## 2019-06-27 ASSESSMENT — PAIN DESCRIPTION - DESCRIPTORS: DESCRIPTORS: CONSTANT;SHARP;CRAMPING

## 2019-06-27 NOTE — ED PROVIDER NOTES
Emergency Department Encounter    Patient: Jessy Telles  MRN: [id number]  : 1980  Date of Evaluation: 2019  ED Provider:  Bautista Ramesh    Triage Chief Complaint:   Flank Pain (Left flank pain x 1 hour with n/v x 3. states that he has h/o kidney stones)    [place]:  Jessy Telles is a 37 y.o. male that presents with complaint of left flank pain, started an hour prior to arrival, came on suddenly, wrap to the left upper quadrant. No chest pain. Felt like could not catch his breath because the pain came on so suddenly but otherwise no shortness of breath and no pain with a deep breath. He had nausea, vomiting x3. Thought he might pass out from the pain. Felt like previous kidney stones though the radiation was new. Pain is 10 out of 10. No lower abdominal pain. No dysuria or hematuria. No leg swelling or pain. Had not taken anything for pain. His father and wife picked him up at work and brought him in. No fevers. No recent illnesses or surgeries. ROS:  10 systems reviewed and negative except as above.      Past Medical History:   Diagnosis Date    Abnormal liver function tests     scheduled for liver bx 10/4/2013    Anxiety     Asthma     Chronic abdominal pain     Depression     FAP (familial adenomatous polyposis)     family hx of this disorder per h&p    Hepatitis C 2006    Hypertension     Kidney stone      Past Surgical History:   Procedure Laterality Date    APPENDECTOMY  age 14    COLONOSCOPY  11    int hem    ENDOSCOPY, COLON, DIAGNOSTIC  2014    gastritis,hiatal hernia    KIDNEY STONE SURGERY      [facility]  7 yr [facility]  10/04/2013    TONSILLECTOMY  as a kid     Family History   Problem Relation Age of Onset    COPD Mother     Diabetes Father     Heart Attack Father     Heart Surgery Father     Heart Disease Father         Mi     Social History     Socioeconomic History    Marital status:      Spouse name: Not on file    Number of children: Not on file    Years of education: Not on file    Highest education level: Not on file   Occupational History    Not on file   Social Needs    Financial resource strain: Not on file    Food insecurity:     Worry: Not on file     Inability: Not on file    Transportation needs:     Medical: Not on file     Non-medical: Not on file   Tobacco Use    Smoking status: Current Every Day Smoker     Packs/day: 0.50     Years: 7.00     Pack years: 3.50     Types: Cigarettes    Smokeless tobacco: Never Used   Substance and Sexual Activity    Alcohol use: No     Comment: ha not drank for over a year    Drug use: No     Types: Cocaine, IV, Other-see comments     Comment: denies any use since Feb 2015    Sexual activity: Yes   Lifestyle    Physical activity:     Days per week: Not on file     Minutes per session: Not on file    Stress: Not on file   Relationships    Social connections:     Talks on phone: Not on file     Gets together: Not on file     Attends Yazdanism service: Not on file     Active member of club or organization: Not on file     Attends meetings of clubs or organizations: Not on file     Relationship status: Not on file    Intimate partner violence:     Fear of current or ex partner: Not on file     Emotionally abused: Not on file     Physically abused: Not on file     Forced sexual activity: Not on file   Other Topics Concern    Not on file   Social History Narrative    Not on file     Current Facility-Administered Medications   Medication Dose Route Frequency Provider Last Rate Last Dose    sodium chloride flush 0.9 % injection 10 mL  10 mL Intravenous PRN Maritza Wilkins MD   10 mL at 06/27/19 1214     Current Outpatient Medications   Medication Sig Dispense Refill    HYDROcodone-acetaminophen (NORCO) 5-325 MG per tablet Take 1 tablet by mouth every 6 hours as needed for Pain for up to 3 days.  10 tablet 0    ibuprofen (ADVIL;MOTRIN) 600 MG tablet Take 1 tablet by mouth Alkaline Phosphatase 80 40 - 129 IU/L    GFR Non-African American >60 >60 mL/min/1.73m2    GFR African American >60 >60 mL/min/1.73m2    Anion Gap 10 4 - 16   Urinalysis (Lab)   Result Value Ref Range    Color, UA YELLOW YELLOW    Clarity, UA CLEAR CLEAR    Glucose, Urine NEGATIVE NEGATIVE MG/DL    Bilirubin Urine NEGATIVE NEGATIVE MG/DL    Ketones, Urine NEGATIVE NEGATIVE MG/DL    Specific Gravity, UA 1.020 1.001 - 1.035    Blood, Urine LARGE (A) NEGATIVE    pH, Urine 6.0 5.0 - 8.0    Protein, UA NEGATIVE NEGATIVE MG/DL    Urobilinogen, Urine 0.2 0.2 - 1.0 MG/DL    Nitrite Urine, Quantitative NEGATIVE NEGATIVE    Leukocyte Esterase, Urine NEGATIVE NEGATIVE    Volume, (UVOL) 12 10 - 12 ML    RBC, UA 50 TO 75 0 - 3 /HPF    WBC, UA 0 TO 2 0 - 2 /HPF    Epi Cells 0 TO 1  SQUAMOUS   /HPF    Cast Type NO CAST FORMS SEEN NO CAST FORMS SEEN /HPF    Bacteria, UA NEGATIVE NEGATIVE /HPF    Crystal Type NONE SEEN NEGATIVE /HPF    Mucus, UA NEGATIVE NEGATIVE HPF   Lipase   Result Value Ref Range    Lipase 26 13 - 60 IU/L   Troponin   Result Value Ref Range    Troponin T <0.010 <0.01 NG/ML   EKG 12 Lead   Result Value Ref Range    Ventricular Rate 72 BPM    Atrial Rate 72 BPM    P-R Interval 142 ms    QRS Duration 76 ms    Q-T Interval 396 ms    QTc Calculation (Bazett) 433 ms    P Axis 46 degrees    R Axis 20 degrees    T Axis 51 degrees    Diagnosis       Normal sinus rhythm  Normal ECG  When compared with ECG of 08-MAY-2017 20:22,  fusion complexes are no longer present        Radiographs (if obtained):    [x] Radiologist's Report Reviewed:  CT ABDOMEN PELVIS WO CONTRAST Additional Contrast? None   Preliminary Result   1. 2 mm calculus at the left UPJ, with mild left-sided hydronephrosis. 2. Punctate nonobstructive left-sided renal calculi. 3. Hepatic steatosis. EKG (if obtained): (All EKG's are interpreted by myself in the absence of a cardiologist)   Normal sinus rhythm with rate 72 bpm, normal intervals.

## 2019-06-27 NOTE — ED NOTES
Vital signs rechecked. Pt states he is feeling much better. States pain 5/10. Saline lock dc'd. Discharge instructions and scripts given to pt and family. Instructed how to take the medications and educated on sedation with the Norco. Pt given kidney stone \"goodie bag\". Instructed to follow up with his urologist and to return to ER if any problems or concerns. Pt and family all verbalize understanding.  Pt discharged ambulatory with family     Mihai Yañez RN  06/27/19 7264

## 2019-06-28 LAB
EKG ATRIAL RATE: 72 BPM
EKG DIAGNOSIS: NORMAL
EKG P AXIS: 46 DEGREES
EKG P-R INTERVAL: 142 MS
EKG Q-T INTERVAL: 396 MS
EKG QRS DURATION: 76 MS
EKG QTC CALCULATION (BAZETT): 433 MS
EKG R AXIS: 20 DEGREES
EKG T AXIS: 51 DEGREES
EKG VENTRICULAR RATE: 72 BPM

## 2019-06-28 PROCEDURE — 93010 ELECTROCARDIOGRAM REPORT: CPT | Performed by: INTERNAL MEDICINE

## 2019-06-29 ENCOUNTER — HOSPITAL ENCOUNTER (EMERGENCY)
Age: 39
Discharge: HOME | End: 2019-06-30
Attending: EMERGENCY MEDICINE
Payer: COMMERCIAL

## 2019-06-29 VITALS
RESPIRATION RATE: 18 BRPM | SYSTOLIC BLOOD PRESSURE: 187 MMHG | HEIGHT: 72 IN | BODY MASS INDEX: 33.86 KG/M2 | HEART RATE: 79 BPM | TEMPERATURE: 98 F | WEIGHT: 250 LBS | DIASTOLIC BLOOD PRESSURE: 98 MMHG | OXYGEN SATURATION: 95 %

## 2019-06-29 DIAGNOSIS — R52 INADEQUATE PAIN CONTROL: ICD-10-CM

## 2019-06-29 DIAGNOSIS — N20.0 KIDNEY STONE: Primary | ICD-10-CM

## 2019-06-29 PROCEDURE — 99284 EMERGENCY DEPT VISIT MOD MDM: CPT

## 2019-06-29 ASSESSMENT — PAIN DESCRIPTION - DESCRIPTORS: DESCRIPTORS: SHARP;SHOOTING;PRESSURE

## 2019-06-29 ASSESSMENT — PAIN DESCRIPTION - ONSET: ONSET: PROGRESSIVE

## 2019-06-29 ASSESSMENT — PAIN DESCRIPTION - LOCATION: LOCATION: FLANK

## 2019-06-29 ASSESSMENT — PAIN DESCRIPTION - ORIENTATION: ORIENTATION: LEFT

## 2019-06-29 ASSESSMENT — PAIN DESCRIPTION - PAIN TYPE: TYPE: ACUTE PAIN

## 2019-06-29 ASSESSMENT — PAIN DESCRIPTION - PROGRESSION: CLINICAL_PROGRESSION: GRADUALLY WORSENING

## 2019-06-29 ASSESSMENT — PAIN SCALES - GENERAL: PAINLEVEL_OUTOF10: 7

## 2019-06-29 ASSESSMENT — PAIN DESCRIPTION - FREQUENCY: FREQUENCY: CONTINUOUS

## 2019-06-29 ASSESSMENT — PAIN - FUNCTIONAL ASSESSMENT: PAIN_FUNCTIONAL_ASSESSMENT: PREVENTS OR INTERFERES SOME ACTIVE ACTIVITIES AND ADLS

## 2019-06-30 ENCOUNTER — APPOINTMENT (OUTPATIENT)
Dept: CT IMAGING | Age: 39
End: 2019-06-30
Payer: COMMERCIAL

## 2019-06-30 LAB
BACTERIA: ABNORMAL /HPF
BILIRUBIN URINE: NEGATIVE MG/DL
BLOOD, URINE: ABNORMAL
CAST TYPE: ABNORMAL /HPF
CLARITY: CLEAR
COLOR: YELLOW
CRYSTAL TYPE: ABNORMAL /HPF
EPITHELIAL CELLS, UA: ABNORMAL /HPF
GLUCOSE, URINE: NEGATIVE MG/DL
KETONES, URINE: NEGATIVE MG/DL
LEUKOCYTE ESTERASE, URINE: NEGATIVE
MUCUS: ABNORMAL HPF
NITRITE URINE, QUANTITATIVE: NEGATIVE
PH, URINE: 6 (ref 5–8)
PROTEIN UA: NEGATIVE MG/DL
RBC URINE: ABNORMAL /HPF (ref 0–3)
SPECIFIC GRAVITY UA: 1.02 (ref 1–1.03)
UROBILINOGEN, URINE: 0.2 MG/DL (ref 0.2–1)
VOLUME, (UVOL): 12 ML (ref 10–12)
WBC UA: ABNORMAL /HPF (ref 0–2)

## 2019-06-30 PROCEDURE — 96375 TX/PRO/DX INJ NEW DRUG ADDON: CPT

## 2019-06-30 PROCEDURE — 74176 CT ABD & PELVIS W/O CONTRAST: CPT

## 2019-06-30 PROCEDURE — 81001 URINALYSIS AUTO W/SCOPE: CPT

## 2019-06-30 PROCEDURE — 6360000002 HC RX W HCPCS: Performed by: EMERGENCY MEDICINE

## 2019-06-30 PROCEDURE — 96374 THER/PROPH/DIAG INJ IV PUSH: CPT

## 2019-06-30 RX ORDER — METOCLOPRAMIDE HYDROCHLORIDE 5 MG/ML
10 INJECTION INTRAMUSCULAR; INTRAVENOUS ONCE
Status: COMPLETED | OUTPATIENT
Start: 2019-06-30 | End: 2019-06-30

## 2019-06-30 RX ORDER — KETOROLAC TROMETHAMINE 30 MG/ML
30 INJECTION, SOLUTION INTRAMUSCULAR; INTRAVENOUS ONCE
Status: COMPLETED | OUTPATIENT
Start: 2019-06-30 | End: 2019-06-30

## 2019-06-30 RX ORDER — OXYCODONE HYDROCHLORIDE AND ACETAMINOPHEN 5; 325 MG/1; MG/1
2 TABLET ORAL ONCE
Status: DISCONTINUED | OUTPATIENT
Start: 2019-06-30 | End: 2019-06-30 | Stop reason: HOSPADM

## 2019-06-30 RX ORDER — OXYCODONE HYDROCHLORIDE AND ACETAMINOPHEN 5; 325 MG/1; MG/1
1-2 TABLET ORAL EVERY 4 HOURS PRN
Qty: 15 TABLET | Refills: 0 | Status: SHIPPED | OUTPATIENT
Start: 2019-06-30 | End: 2019-07-03

## 2019-06-30 RX ORDER — MORPHINE SULFATE 4 MG/ML
4 INJECTION, SOLUTION INTRAMUSCULAR; INTRAVENOUS EVERY 30 MIN PRN
Status: DISCONTINUED | OUTPATIENT
Start: 2019-06-30 | End: 2019-06-30 | Stop reason: HOSPADM

## 2019-06-30 RX ORDER — DIPHENHYDRAMINE HYDROCHLORIDE 50 MG/ML
50 INJECTION INTRAMUSCULAR; INTRAVENOUS ONCE
Status: COMPLETED | OUTPATIENT
Start: 2019-06-30 | End: 2019-06-30

## 2019-06-30 RX ADMIN — KETOROLAC TROMETHAMINE 30 MG: 30 INJECTION, SOLUTION INTRAMUSCULAR at 00:19

## 2019-06-30 RX ADMIN — DIPHENHYDRAMINE HYDROCHLORIDE 50 MG: 50 INJECTION INTRAMUSCULAR; INTRAVENOUS at 00:19

## 2019-06-30 RX ADMIN — METOCLOPRAMIDE 10 MG: 5 INJECTION, SOLUTION INTRAMUSCULAR; INTRAVENOUS at 00:19

## 2019-06-30 RX ADMIN — MORPHINE SULFATE 4 MG: 4 INJECTION INTRAVENOUS at 00:19

## 2019-06-30 ASSESSMENT — ENCOUNTER SYMPTOMS
CONSTIPATION: 0
RESPIRATORY NEGATIVE: 1
BELCHING: 0
VOMITING: 0
EYES NEGATIVE: 1
COUGH: 0
NAUSEA: 1
DIARRHEA: 0
ABDOMINAL PAIN: 1

## 2019-06-30 NOTE — ED PROVIDER NOTES
The history is provided by the patient. Abdominal Pain   Pain location:  L flank  Pain quality: sharp, shooting and stabbing    Pain radiates to:  Suprapubic region  Pain severity:  Severe  Timing:  Constant  Progression:  Worsening  Chronicity:  New (old pain got worse and he is out of norco)  Relieved by:  Nothing  Worsened by:  Nothing  Ineffective treatments:  None tried  Associated symptoms: hematuria and nausea    Associated symptoms: no anorexia, no belching, no chest pain, no chills, no constipation, no cough, no diarrhea, no fatigue, no fever and no vomiting        Review of Systems   Constitutional: Negative. Negative for chills, fatigue and fever. HENT: Negative. Eyes: Negative. Respiratory: Negative. Negative for cough. Cardiovascular: Negative. Negative for chest pain. Gastrointestinal: Positive for abdominal pain and nausea. Negative for anorexia, constipation, diarrhea and vomiting. Genitourinary: Positive for hematuria. Musculoskeletal: Negative. Skin: Negative. Neurological: Negative. All other systems reviewed and are negative.       Family History   Problem Relation Age of Onset    COPD Mother     Diabetes Father     Heart Attack Father     Heart Surgery Father     Heart Disease Father         Mi     Social History     Socioeconomic History    Marital status:      Spouse name: Not on file    Number of children: Not on file    Years of education: Not on file    Highest education level: Not on file   Occupational History    Not on file   Social Needs    Financial resource strain: Not on file    Food insecurity:     Worry: Not on file     Inability: Not on file    Transportation needs:     Medical: Not on file     Non-medical: Not on file   Tobacco Use    Smoking status: Current Every Day Smoker     Packs/day: 0.50     Years: 7.00     Pack years: 3.50     Types: Cigarettes    Smokeless tobacco: Never Used   Substance and Sexual Activity    person, place, and time. He appears well-developed and well-nourished. He appears distressed. HENT:   Head: Normocephalic and atraumatic. Right Ear: External ear normal.   Left Ear: External ear normal.   Nose: Nose normal.   Mouth/Throat: Oropharynx is clear and moist.   Eyes: Pupils are equal, round, and reactive to light. Conjunctivae and EOM are normal.   Neck: Normal range of motion. Neck supple. Cardiovascular: Normal rate, regular rhythm, normal heart sounds and intact distal pulses. Pulmonary/Chest: Effort normal and breath sounds normal.   Abdominal: Soft. Bowel sounds are normal.   Musculoskeletal: Normal range of motion. Neurological: He is alert and oriented to person, place, and time. GCS eye subscore is 4. GCS verbal subscore is 5. GCS motor subscore is 6. Skin: Skin is warm and dry. Psychiatric: He has a normal mood and affect. His behavior is normal. Judgment and thought content normal.       MDM:    Results for orders placed or performed during the hospital encounter of 06/29/19   Urinalysis (Lab)   Result Value Ref Range    Color, UA YELLOW YELLOW    Clarity, UA CLEAR CLEAR    Glucose, Urine NEGATIVE NEGATIVE MG/DL    Bilirubin Urine NEGATIVE NEGATIVE MG/DL    Ketones, Urine NEGATIVE NEGATIVE MG/DL    Specific Gravity, UA 1.020 1.001 - 1.035    Blood, Urine TRACE (A) NEGATIVE    pH, Urine 6.0 5.0 - 8.0    Protein, UA NEGATIVE NEGATIVE MG/DL    Urobilinogen, Urine 0.2 0.2 - 1.0 MG/DL    Nitrite Urine, Quantitative NEGATIVE NEGATIVE    Leukocyte Esterase, Urine NEGATIVE NEGATIVE    Volume, (UVOL) 12 10 - 12 ML    RBC, UA 2 TO 3 0 - 3 /HPF    WBC, UA 0 TO 1 0 - 2 /HPF    Epi Cells NO CELLS SEEN /HPF    Cast Type NO CAST FORMS SEEN NO CAST FORMS SEEN /HPF    Bacteria, UA RARE (A) NEGATIVE /HPF    Crystal Type OCCASIONAL  AMORPHOUS URATES   (A) NEGATIVE /HPF    Mucus, UA 1+ (A) NEGATIVE HPF     CT ABDOMEN PELVIS WO CONTRAST   Preliminary Result   1.  Unchanged 2 mm stone at the left

## 2019-08-01 ENCOUNTER — HOSPITAL ENCOUNTER (EMERGENCY)
Age: 39
Discharge: HOME | End: 2019-08-02
Attending: EMERGENCY MEDICINE
Payer: COMMERCIAL

## 2019-08-01 VITALS
DIASTOLIC BLOOD PRESSURE: 92 MMHG | TEMPERATURE: 97.9 F | WEIGHT: 250 LBS | HEIGHT: 72 IN | HEART RATE: 78 BPM | BODY MASS INDEX: 33.86 KG/M2 | OXYGEN SATURATION: 97 % | RESPIRATION RATE: 16 BRPM | SYSTOLIC BLOOD PRESSURE: 168 MMHG

## 2019-08-01 DIAGNOSIS — R31.0 HEMATURIA, GROSS: Primary | ICD-10-CM

## 2019-08-01 PROCEDURE — 99283 EMERGENCY DEPT VISIT LOW MDM: CPT

## 2019-08-01 ASSESSMENT — PAIN DESCRIPTION - PAIN TYPE: TYPE: ACUTE PAIN

## 2019-08-01 ASSESSMENT — PAIN SCALES - GENERAL: PAINLEVEL_OUTOF10: 7

## 2019-08-01 ASSESSMENT — PAIN DESCRIPTION - LOCATION: LOCATION: SCROTUM

## 2019-08-02 LAB
BACTERIA: ABNORMAL /HPF
BILIRUBIN URINE: NEGATIVE MG/DL
BLOOD, URINE: ABNORMAL
CAST TYPE: NEGATIVE /HPF
CLARITY: ABNORMAL
COLOR: ABNORMAL
CRYSTAL TYPE: NEGATIVE /HPF
EPITHELIAL CELLS, UA: NEGATIVE /HPF
GLUCOSE, URINE: NEGATIVE MG/DL
KETONES, URINE: NEGATIVE MG/DL
LEUKOCYTE ESTERASE, URINE: NEGATIVE
NITRITE URINE, QUANTITATIVE: NEGATIVE
PH, URINE: 6 (ref 5–8)
PROTEIN UA: 100 MG/DL
RBC URINE: ABNORMAL /HPF (ref 0–3)
SPECIFIC GRAVITY UA: 1.03 (ref 1–1.03)
UROBILINOGEN, URINE: 0.2 MG/DL (ref 0.2–1)
WBC UA: ABNORMAL /HPF (ref 0–2)

## 2019-08-02 PROCEDURE — 6370000000 HC RX 637 (ALT 250 FOR IP): Performed by: EMERGENCY MEDICINE

## 2019-08-02 PROCEDURE — 87086 URINE CULTURE/COLONY COUNT: CPT

## 2019-08-02 PROCEDURE — 81001 URINALYSIS AUTO W/SCOPE: CPT

## 2019-08-02 RX ORDER — PHENAZOPYRIDINE HYDROCHLORIDE 200 MG/1
200 TABLET, FILM COATED ORAL 3 TIMES DAILY PRN
Qty: 9 TABLET | Refills: 0 | Status: SHIPPED | OUTPATIENT
Start: 2019-08-02 | End: 2019-08-05

## 2019-08-02 RX ORDER — CIPROFLOXACIN 500 MG/1
500 TABLET, FILM COATED ORAL 2 TIMES DAILY
Qty: 20 TABLET | Refills: 0 | Status: SHIPPED | OUTPATIENT
Start: 2019-08-02 | End: 2019-08-12

## 2019-08-02 RX ORDER — PHENAZOPYRIDINE HYDROCHLORIDE 100 MG/1
200 TABLET, FILM COATED ORAL ONCE
Status: COMPLETED | OUTPATIENT
Start: 2019-08-02 | End: 2019-08-02

## 2019-08-02 RX ORDER — HYDROCODONE BITARTRATE AND ACETAMINOPHEN 5; 325 MG/1; MG/1
1 TABLET ORAL ONCE
Status: COMPLETED | OUTPATIENT
Start: 2019-08-02 | End: 2019-08-02

## 2019-08-02 RX ORDER — CIPROFLOXACIN 500 MG/1
500 TABLET, FILM COATED ORAL ONCE
Status: COMPLETED | OUTPATIENT
Start: 2019-08-02 | End: 2019-08-02

## 2019-08-02 RX ADMIN — HYDROCODONE BITARTRATE AND ACETAMINOPHEN 1 TABLET: 5; 325 TABLET ORAL at 00:28

## 2019-08-02 RX ADMIN — PHENAZOPYRIDINE HYDROCHLORIDE 200 MG: 100 TABLET ORAL at 00:28

## 2019-08-02 RX ADMIN — CIPROFLOXACIN HYDROCHLORIDE 500 MG: 500 TABLET, FILM COATED ORAL at 00:28

## 2019-08-02 ASSESSMENT — ENCOUNTER SYMPTOMS
DIARRHEA: 0
EYES NEGATIVE: 1
VOMITING: 0
ABDOMINAL PAIN: 0
NAUSEA: 1
RESPIRATORY NEGATIVE: 1

## 2019-08-03 LAB
CULTURE: NORMAL
Lab: NORMAL
SPECIMEN: NORMAL

## 2019-08-09 ENCOUNTER — APPOINTMENT (OUTPATIENT)
Dept: CT IMAGING | Age: 39
End: 2019-08-09
Payer: COMMERCIAL

## 2019-08-09 ENCOUNTER — HOSPITAL ENCOUNTER (EMERGENCY)
Age: 39
Discharge: HOME | End: 2019-08-09
Attending: EMERGENCY MEDICINE
Payer: COMMERCIAL

## 2019-08-09 VITALS
SYSTOLIC BLOOD PRESSURE: 141 MMHG | TEMPERATURE: 98.6 F | DIASTOLIC BLOOD PRESSURE: 91 MMHG | RESPIRATION RATE: 18 BRPM | WEIGHT: 256 LBS | HEART RATE: 86 BPM | OXYGEN SATURATION: 97 % | HEIGHT: 72 IN | BODY MASS INDEX: 34.67 KG/M2

## 2019-08-09 DIAGNOSIS — R10.9 FLANK PAIN: Primary | ICD-10-CM

## 2019-08-09 LAB
ALBUMIN SERPL-MCNC: 4.1 GM/DL (ref 3.4–5)
ALP BLD-CCNC: 80 IU/L (ref 40–129)
ALT SERPL-CCNC: 42 U/L (ref 10–40)
ANION GAP SERPL CALCULATED.3IONS-SCNC: 13 MMOL/L (ref 4–16)
AST SERPL-CCNC: 31 IU/L (ref 15–37)
BACTERIA: ABNORMAL /HPF
BASOPHILS ABSOLUTE: 0 K/CU MM
BASOPHILS RELATIVE PERCENT: 0.3 % (ref 0–1)
BILIRUB SERPL-MCNC: 0.3 MG/DL (ref 0–1)
BILIRUBIN URINE: NEGATIVE MG/DL
BLOOD, URINE: NEGATIVE
BUN BLDV-MCNC: 22 MG/DL (ref 6–23)
CALCIUM SERPL-MCNC: 10.4 MG/DL (ref 8.3–10.6)
CAST TYPE: ABNORMAL /HPF
CHLORIDE BLD-SCNC: 105 MMOL/L (ref 99–110)
CLARITY: CLEAR
CO2: 23 MMOL/L (ref 21–32)
COLOR: YELLOW
CREAT SERPL-MCNC: 1 MG/DL (ref 0.9–1.3)
CRYSTAL TYPE: ABNORMAL /HPF
DIFFERENTIAL TYPE: ABNORMAL
EOSINOPHILS ABSOLUTE: 0.2 K/CU MM
EOSINOPHILS RELATIVE PERCENT: 1.5 % (ref 0–3)
EPITHELIAL CELLS, UA: ABNORMAL /HPF
GFR AFRICAN AMERICAN: >60 ML/MIN/1.73M2
GFR NON-AFRICAN AMERICAN: >60 ML/MIN/1.73M2
GLUCOSE BLD-MCNC: 109 MG/DL (ref 70–99)
GLUCOSE, URINE: NEGATIVE MG/DL
HCT VFR BLD CALC: 49 % (ref 42–52)
HEMOGLOBIN: 16.5 GM/DL (ref 13.5–18)
IMMATURE NEUTROPHIL %: 0.5 % (ref 0–0.43)
KETONES, URINE: NEGATIVE MG/DL
LEUKOCYTE ESTERASE, URINE: NEGATIVE
LYMPHOCYTES ABSOLUTE: 3.9 K/CU MM
LYMPHOCYTES RELATIVE PERCENT: 32.5 % (ref 24–44)
MCH RBC QN AUTO: 29 PG (ref 27–31)
MCHC RBC AUTO-ENTMCNC: 33.7 % (ref 32–36)
MCV RBC AUTO: 86.3 FL (ref 78–100)
MONOCYTES ABSOLUTE: 0.8 K/CU MM
MONOCYTES RELATIVE PERCENT: 6.4 % (ref 0–4)
MUCUS: ABNORMAL HPF
NITRITE URINE, QUANTITATIVE: NEGATIVE
PDW BLD-RTO: 14.4 % (ref 11.7–14.9)
PH, URINE: 5 (ref 5–8)
PLATELET # BLD: 277 K/CU MM (ref 140–440)
PMV BLD AUTO: 9.5 FL (ref 7.5–11.1)
POTASSIUM SERPL-SCNC: 3.9 MMOL/L (ref 3.5–5.1)
PROTEIN UA: NEGATIVE MG/DL
RBC # BLD: 5.68 M/CU MM (ref 4.6–6.2)
RBC URINE: ABNORMAL /HPF (ref 0–3)
SEGMENTED NEUTROPHILS ABSOLUTE COUNT: 7 K/CU MM
SEGMENTED NEUTROPHILS RELATIVE PERCENT: 58.8 % (ref 36–66)
SODIUM BLD-SCNC: 141 MMOL/L (ref 135–145)
SPECIFIC GRAVITY UA: 1.03 (ref 1–1.03)
TOTAL IMMATURE NEUTOROPHIL: 0.06 K/CU MM
TOTAL PROTEIN: 7.4 GM/DL (ref 6.4–8.2)
UROBILINOGEN, URINE: 0.2 MG/DL (ref 0.2–1)
VOLUME, (UVOL): 12 ML (ref 10–12)
WBC # BLD: 11.9 K/CU MM (ref 4–10.5)
WBC UA: ABNORMAL /HPF (ref 0–2)

## 2019-08-09 PROCEDURE — 6370000000 HC RX 637 (ALT 250 FOR IP): Performed by: EMERGENCY MEDICINE

## 2019-08-09 PROCEDURE — 85025 COMPLETE CBC W/AUTO DIFF WBC: CPT

## 2019-08-09 PROCEDURE — 81001 URINALYSIS AUTO W/SCOPE: CPT

## 2019-08-09 PROCEDURE — 99284 EMERGENCY DEPT VISIT MOD MDM: CPT

## 2019-08-09 PROCEDURE — 80053 COMPREHEN METABOLIC PANEL: CPT

## 2019-08-09 PROCEDURE — 74176 CT ABD & PELVIS W/O CONTRAST: CPT

## 2019-08-09 RX ORDER — HYDROCODONE BITARTRATE AND ACETAMINOPHEN 5; 325 MG/1; MG/1
1 TABLET ORAL EVERY 4 HOURS PRN
Qty: 15 TABLET | Refills: 0 | Status: SHIPPED | OUTPATIENT
Start: 2019-08-09 | End: 2019-08-12

## 2019-08-09 RX ORDER — LIDOCAINE 4 G/G
1 PATCH TOPICAL DAILY
Status: DISCONTINUED | OUTPATIENT
Start: 2019-08-09 | End: 2019-08-09 | Stop reason: HOSPADM

## 2019-08-09 RX ORDER — HYDROCODONE BITARTRATE AND ACETAMINOPHEN 5; 325 MG/1; MG/1
1 TABLET ORAL ONCE
Status: COMPLETED | OUTPATIENT
Start: 2019-08-09 | End: 2019-08-09

## 2019-08-09 RX ORDER — CYCLOBENZAPRINE HCL 10 MG
10 TABLET ORAL 3 TIMES DAILY PRN
Qty: 30 TABLET | Refills: 0 | Status: SHIPPED | OUTPATIENT
Start: 2019-08-09 | End: 2019-08-19

## 2019-08-09 RX ADMIN — HYDROCODONE BITARTRATE AND ACETAMINOPHEN 1 TABLET: 5; 325 TABLET ORAL at 18:00

## 2019-08-09 ASSESSMENT — PAIN DESCRIPTION - DESCRIPTORS: DESCRIPTORS: STABBING

## 2019-08-09 ASSESSMENT — PAIN DESCRIPTION - LOCATION: LOCATION: BACK

## 2019-08-09 ASSESSMENT — PAIN DESCRIPTION - ORIENTATION: ORIENTATION: RIGHT

## 2019-08-09 ASSESSMENT — PAIN SCALES - GENERAL: PAINLEVEL_OUTOF10: 7

## 2019-08-09 ASSESSMENT — PAIN DESCRIPTION - FREQUENCY: FREQUENCY: INTERMITTENT

## 2019-08-09 ASSESSMENT — PAIN DESCRIPTION - PAIN TYPE: TYPE: ACUTE PAIN

## 2019-08-09 NOTE — ED PROVIDER NOTES
patient. Did place a lidocaine patch on his back. Will discharge with muscle relaxers, pain medication and have him follow-up with his PCP as well as his urologist.    Clinical Impression:  1. Flank pain        Disposition Vitals:  [unfilled], [unfilled], [unfilled], [unfilled]    Disposition referral (if applicable):  [facility]          [facility], [place], [facility], [address] [phone]            Disposition medications (if applicable):  New Prescriptions    CYCLOBENZAPRINE (FLEXERIL) 10 MG TABLET    Take 1 tablet by mouth 3 times daily as needed for Muscle spasms    HYDROCODONE-ACETAMINOPHEN (NORCO) 5-325 MG PER TABLET    Take 1 tablet by mouth every 4 hours as needed for Pain for up to 3 days.          (Please note that portions of this note may have been completed with a voice recognition program. Efforts were made to edit the dictations but occasionally words are mis-transcribed.)    MD David Cline MD  08/09/19 [id number]

## 2019-08-16 RX ORDER — PROPRANOLOL HCL 60 MG
CAPSULE, EXTENDED RELEASE 24HR ORAL
Qty: 30 CAPSULE | Refills: 1 | Status: SHIPPED | OUTPATIENT
Start: 2019-08-16 | End: 2019-08-22

## 2019-08-22 ENCOUNTER — OFFICE VISIT (OUTPATIENT)
Dept: FAMILY MEDICINE CLINIC | Age: 39
End: 2019-08-22
Payer: COMMERCIAL

## 2019-08-22 VITALS
BODY MASS INDEX: 34.96 KG/M2 | SYSTOLIC BLOOD PRESSURE: 126 MMHG | DIASTOLIC BLOOD PRESSURE: 70 MMHG | WEIGHT: 257.8 LBS | HEART RATE: 88 BPM | RESPIRATION RATE: 18 BRPM

## 2019-08-22 DIAGNOSIS — G89.29 CHRONIC LEFT-SIDED LOW BACK PAIN WITH LEFT-SIDED SCIATICA: ICD-10-CM

## 2019-08-22 DIAGNOSIS — G57.93 NEUROPATHY OF BOTH FEET: ICD-10-CM

## 2019-08-22 DIAGNOSIS — Z87.442 HISTORY OF KIDNEY STONES: ICD-10-CM

## 2019-08-22 DIAGNOSIS — F33.1 MODERATE EPISODE OF RECURRENT MAJOR DEPRESSIVE DISORDER (HCC): ICD-10-CM

## 2019-08-22 DIAGNOSIS — I10 ESSENTIAL HYPERTENSION: Primary | ICD-10-CM

## 2019-08-22 DIAGNOSIS — M54.42 CHRONIC LEFT-SIDED LOW BACK PAIN WITH LEFT-SIDED SCIATICA: ICD-10-CM

## 2019-08-22 DIAGNOSIS — G89.29 CHRONIC PAIN OF LEFT KNEE: ICD-10-CM

## 2019-08-22 DIAGNOSIS — M25.562 CHRONIC PAIN OF LEFT KNEE: ICD-10-CM

## 2019-08-22 PROCEDURE — G0444 DEPRESSION SCREEN ANNUAL: HCPCS | Performed by: PHYSICIAN ASSISTANT

## 2019-08-22 PROCEDURE — 99214 OFFICE O/P EST MOD 30 MIN: CPT | Performed by: PHYSICIAN ASSISTANT

## 2019-08-22 RX ORDER — GABAPENTIN 600 MG/1
600 TABLET ORAL 3 TIMES DAILY
Qty: 90 TABLET | Refills: 2 | Status: SHIPPED | OUTPATIENT
Start: 2019-08-22 | End: 2020-01-08 | Stop reason: SDUPTHER

## 2019-08-22 ASSESSMENT — ENCOUNTER SYMPTOMS
COUGH: 0
VOMITING: 0
ABDOMINAL PAIN: 0
SHORTNESS OF BREATH: 0
NAUSEA: 0
BACK PAIN: 1

## 2019-08-22 ASSESSMENT — PATIENT HEALTH QUESTIONNAIRE - PHQ9
2. FEELING DOWN, DEPRESSED OR HOPELESS: 3
10. IF YOU CHECKED OFF ANY PROBLEMS, HOW DIFFICULT HAVE THESE PROBLEMS MADE IT FOR YOU TO DO YOUR WORK, TAKE CARE OF THINGS AT HOME, OR GET ALONG WITH OTHER PEOPLE: 1
7. TROUBLE CONCENTRATING ON THINGS, SUCH AS READING THE NEWSPAPER OR WATCHING TELEVISION: 1
3. TROUBLE FALLING OR STAYING ASLEEP: 2
5. POOR APPETITE OR OVEREATING: 3
SUM OF ALL RESPONSES TO PHQ QUESTIONS 1-9: 15
8. MOVING OR SPEAKING SO SLOWLY THAT OTHER PEOPLE COULD HAVE NOTICED. OR THE OPPOSITE, BEING SO FIGETY OR RESTLESS THAT YOU HAVE BEEN MOVING AROUND A LOT MORE THAN USUAL: 0
6. FEELING BAD ABOUT YOURSELF - OR THAT YOU ARE A FAILURE OR HAVE LET YOURSELF OR YOUR FAMILY DOWN: 3
SUM OF ALL RESPONSES TO PHQ9 QUESTIONS 1 & 2: 3
SUM OF ALL RESPONSES TO PHQ QUESTIONS 1-9: 15
9. THOUGHTS THAT YOU WOULD BE BETTER OFF DEAD, OR OF HURTING YOURSELF: 0
4. FEELING TIRED OR HAVING LITTLE ENERGY: 3
1. LITTLE INTEREST OR PLEASURE IN DOING THINGS: 0

## 2019-08-22 NOTE — PROGRESS NOTES
diazepam (VALIUM) injection 5 mg (Completed)    diazepam (VALIUM) tablet 5 mg (Completed)    FLUoxetine (PROZAC) 40 MG capsule    History of kidney stones     Pt to follow up with Dr Tori Goldberg, continue to drink plenty of water, avoid triggers         Chronic pain of left knee     Will get xrays now, may need referral to ortho if pain continues         Relevant Medications    ketorolac (TORADOL) injection 60 mg (Completed)    nalbuphine (NUBAIN) injection 10 mg (Completed)    ketorolac (TORADOL) injection 30 mg (Completed)    ketorolac (TORADOL) injection 30 mg (Completed)    HYDROmorphone (DILAUDID) injection 1 mg (Completed)    ketorolac (TORADOL) injection 60 mg (Completed)    ketorolac (TORADOL) injection 60 mg (Completed)    aspirin chewable tablet 324 mg (Completed)    ketorolac (TORADOL) injection 30 mg (Completed)    ketorolac (TORADOL) injection 30 mg (Completed)    ketorolac (TORADOL) injection 30 mg (Completed)    HYDROcodone-acetaminophen (NORCO) 5-325 MG per tablet 1 tablet (Completed)    ketorolac (TORADOL) injection 30 mg (Completed)    HYDROcodone-acetaminophen (NORCO) 5-325 MG per tablet 1 tablet (Completed)    methocarbamol (ROBAXIN) tablet 750 mg (Completed)    betamethasone acetate-betamethasone sodium phosphate (CELESTONE) injection 12 mg (Completed)    ketorolac (TORADOL) injection 30 mg (Completed)    orphenadrine (NORFLEX) injection 60 mg (Completed)    methylPREDNISolone sodium (SOLU-MEDROL) injection 125 mg (Completed)    betamethasone acetate-betamethasone sodium phosphate (CELESTONE) injection 12 mg (Completed)    FLUoxetine (PROZAC) 40 MG capsule    predniSONE (DELTASONE) tablet 60 mg (Completed)    ketorolac (TORADOL) injection 30 mg (Completed)    HYDROcodone-acetaminophen (NORCO) 5-325 MG per tablet 1 tablet (Completed)    ibuprofen (ADVIL;MOTRIN) 600 MG tablet    ketorolac (TORADOL) injection 30 mg (Completed)    HYDROcodone-acetaminophen (NORCO) 5-325 MG per tablet 1 tablet

## 2019-09-17 DIAGNOSIS — F33.1 MODERATE EPISODE OF RECURRENT MAJOR DEPRESSIVE DISORDER (HCC): ICD-10-CM

## 2019-09-17 RX ORDER — DOXEPIN HYDROCHLORIDE 10 MG/1
CAPSULE ORAL
Qty: 90 CAPSULE | Refills: 0 | Status: SHIPPED | OUTPATIENT
Start: 2019-09-17 | End: 2020-01-08 | Stop reason: CLARIF

## 2019-09-17 RX ORDER — BUSPIRONE HYDROCHLORIDE 15 MG/1
TABLET ORAL
Qty: 270 TABLET | Refills: 0 | Status: SHIPPED | OUTPATIENT
Start: 2019-09-17 | End: 2020-03-15

## 2019-09-17 RX ORDER — FLUOXETINE HYDROCHLORIDE 40 MG/1
CAPSULE ORAL
Qty: 90 CAPSULE | Refills: 0 | Status: SHIPPED | OUTPATIENT
Start: 2019-09-17 | End: 2020-01-08 | Stop reason: CLARIF

## 2019-12-09 ENCOUNTER — HOSPITAL ENCOUNTER (EMERGENCY)
Age: 39
Discharge: HOME | End: 2019-12-09
Attending: EMERGENCY MEDICINE
Payer: COMMERCIAL

## 2019-12-09 VITALS
SYSTOLIC BLOOD PRESSURE: 139 MMHG | BODY MASS INDEX: 34.81 KG/M2 | WEIGHT: 257 LBS | RESPIRATION RATE: 16 BRPM | DIASTOLIC BLOOD PRESSURE: 84 MMHG | HEART RATE: 80 BPM | HEIGHT: 72 IN | TEMPERATURE: 98.1 F | OXYGEN SATURATION: 98 %

## 2019-12-09 DIAGNOSIS — R10.30 LOWER ABDOMINAL PAIN: Primary | ICD-10-CM

## 2019-12-09 LAB
BACTERIA: ABNORMAL /HPF
BILIRUBIN URINE: NEGATIVE MG/DL
BLOOD, URINE: NEGATIVE
CAST TYPE: ABNORMAL /HPF
CLARITY: CLEAR
COLOR: YELLOW
CRYSTAL TYPE: ABNORMAL /HPF
EPITHELIAL CELLS, UA: ABNORMAL /HPF
GLUCOSE, URINE: NEGATIVE MG/DL
KETONES, URINE: NEGATIVE MG/DL
LEUKOCYTE ESTERASE, URINE: NEGATIVE
MUCUS: ABNORMAL HPF
NITRITE URINE, QUANTITATIVE: NEGATIVE
PH, URINE: 5.5 (ref 5–8)
PROTEIN UA: NEGATIVE MG/DL
RBC URINE: ABNORMAL /HPF (ref 0–3)
SPECIFIC GRAVITY UA: 1.03 (ref 1–1.03)
UROBILINOGEN, URINE: 0.2 MG/DL (ref 0.2–1)
VOLUME, (UVOL): 12 ML (ref 10–12)
WBC UA: ABNORMAL /HPF (ref 0–2)

## 2019-12-09 PROCEDURE — 99283 EMERGENCY DEPT VISIT LOW MDM: CPT

## 2019-12-09 PROCEDURE — 87086 URINE CULTURE/COLONY COUNT: CPT

## 2019-12-09 PROCEDURE — 81001 URINALYSIS AUTO W/SCOPE: CPT

## 2019-12-09 RX ORDER — CYCLOBENZAPRINE HCL 10 MG
TABLET ORAL
COMMUNITY
Start: 2019-12-03 | End: 2020-01-22 | Stop reason: CLARIF

## 2019-12-09 RX ORDER — DICYCLOMINE HYDROCHLORIDE 10 MG/1
20 CAPSULE ORAL
Qty: 30 CAPSULE | Refills: 1 | Status: SHIPPED | OUTPATIENT
Start: 2019-12-09 | End: 2020-01-08 | Stop reason: CLARIF

## 2019-12-09 ASSESSMENT — ENCOUNTER SYMPTOMS
VOMITING: 0
ABDOMINAL PAIN: 1
NAUSEA: 0
DIARRHEA: 0

## 2019-12-09 ASSESSMENT — PAIN SCALES - GENERAL: PAINLEVEL_OUTOF10: 4

## 2019-12-09 ASSESSMENT — PAIN DESCRIPTION - PAIN TYPE: TYPE: ACUTE PAIN

## 2019-12-09 ASSESSMENT — PAIN DESCRIPTION - LOCATION: LOCATION: FLANK

## 2019-12-10 LAB
CULTURE: NORMAL
Lab: NORMAL
SPECIMEN: NORMAL

## 2020-01-08 ENCOUNTER — OFFICE VISIT (OUTPATIENT)
Dept: FAMILY MEDICINE CLINIC | Age: 40
End: 2020-01-08
Payer: MEDICAID

## 2020-01-08 VITALS
SYSTOLIC BLOOD PRESSURE: 144 MMHG | TEMPERATURE: 97.7 F | BODY MASS INDEX: 36.08 KG/M2 | WEIGHT: 266.38 LBS | OXYGEN SATURATION: 96 % | HEART RATE: 88 BPM | HEIGHT: 72 IN | RESPIRATION RATE: 16 BRPM | DIASTOLIC BLOOD PRESSURE: 100 MMHG

## 2020-01-08 PROBLEM — J40 BRONCHITIS: Status: ACTIVE | Noted: 2020-01-08

## 2020-01-08 PROBLEM — Z71.6 ENCOUNTER FOR SMOKING CESSATION COUNSELING: Status: ACTIVE | Noted: 2020-01-08

## 2020-01-08 PROCEDURE — 99214 OFFICE O/P EST MOD 30 MIN: CPT | Performed by: NURSE PRACTITIONER

## 2020-01-08 PROCEDURE — G8431 POS CLIN DEPRES SCRN F/U DOC: HCPCS | Performed by: NURSE PRACTITIONER

## 2020-01-08 RX ORDER — NICOTINE 21 MG/24HR
1 PATCH, TRANSDERMAL 24 HOURS TRANSDERMAL DAILY
Qty: 42 PATCH | Refills: 0 | Status: SHIPPED | OUTPATIENT
Start: 2020-01-08 | End: 2020-05-05 | Stop reason: ALTCHOICE

## 2020-01-08 RX ORDER — BUSPIRONE HYDROCHLORIDE 15 MG/1
TABLET ORAL
Qty: 270 TABLET | Refills: 0 | Status: CANCELLED | OUTPATIENT
Start: 2020-01-08

## 2020-01-08 RX ORDER — GABAPENTIN 600 MG/1
600 TABLET ORAL 3 TIMES DAILY
Qty: 90 TABLET | Refills: 2 | Status: SHIPPED | OUTPATIENT
Start: 2020-01-08 | End: 2020-03-24 | Stop reason: SDUPTHER

## 2020-01-08 RX ORDER — BUPROPION HYDROCHLORIDE 150 MG/1
TABLET, EXTENDED RELEASE ORAL
Qty: 57 TABLET | Refills: 3 | Status: SHIPPED | OUTPATIENT
Start: 2020-01-08 | End: 2020-01-22 | Stop reason: DRUGHIGH

## 2020-01-08 RX ORDER — AMLODIPINE BESYLATE 5 MG/1
5 TABLET ORAL DAILY
Qty: 30 TABLET | Refills: 0 | Status: SHIPPED | OUTPATIENT
Start: 2020-01-08 | End: 2020-01-22 | Stop reason: SDUPTHER

## 2020-01-08 RX ORDER — PREDNISONE 20 MG/1
40 TABLET ORAL DAILY
Qty: 10 TABLET | Refills: 0 | Status: SHIPPED | OUTPATIENT
Start: 2020-01-08 | End: 2020-01-13

## 2020-01-08 RX ORDER — IBUPROFEN 600 MG/1
600 TABLET ORAL 4 TIMES DAILY PRN
Qty: 360 TABLET | Refills: 1 | Status: SHIPPED | OUTPATIENT
Start: 2020-01-08 | End: 2020-05-05 | Stop reason: SDUPTHER

## 2020-01-08 RX ORDER — AZITHROMYCIN 250 MG/1
250 TABLET, FILM COATED ORAL SEE ADMIN INSTRUCTIONS
Qty: 6 TABLET | Refills: 0 | Status: SHIPPED | OUTPATIENT
Start: 2020-01-08 | End: 2020-01-13

## 2020-01-08 RX ORDER — DOXEPIN HYDROCHLORIDE 10 MG/1
CAPSULE ORAL
Qty: 90 CAPSULE | Refills: 0 | Status: CANCELLED | OUTPATIENT
Start: 2020-01-08

## 2020-01-08 ASSESSMENT — PATIENT HEALTH QUESTIONNAIRE - PHQ9
10. IF YOU CHECKED OFF ANY PROBLEMS, HOW DIFFICULT HAVE THESE PROBLEMS MADE IT FOR YOU TO DO YOUR WORK, TAKE CARE OF THINGS AT HOME, OR GET ALONG WITH OTHER PEOPLE: 3
9. THOUGHTS THAT YOU WOULD BE BETTER OFF DEAD, OR OF HURTING YOURSELF: 1
5. POOR APPETITE OR OVEREATING: 0
6. FEELING BAD ABOUT YOURSELF - OR THAT YOU ARE A FAILURE OR HAVE LET YOURSELF OR YOUR FAMILY DOWN: 2
1. LITTLE INTEREST OR PLEASURE IN DOING THINGS: 3
3. TROUBLE FALLING OR STAYING ASLEEP: 0
2. FEELING DOWN, DEPRESSED OR HOPELESS: 3
SUM OF ALL RESPONSES TO PHQ QUESTIONS 1-9: 16
SUM OF ALL RESPONSES TO PHQ9 QUESTIONS 1 & 2: 6
SUM OF ALL RESPONSES TO PHQ QUESTIONS 1-9: 16
4. FEELING TIRED OR HAVING LITTLE ENERGY: 2
8. MOVING OR SPEAKING SO SLOWLY THAT OTHER PEOPLE COULD HAVE NOTICED. OR THE OPPOSITE, BEING SO FIGETY OR RESTLESS THAT YOU HAVE BEEN MOVING AROUND A LOT MORE THAN USUAL: 3
7. TROUBLE CONCENTRATING ON THINGS, SUCH AS READING THE NEWSPAPER OR WATCHING TELEVISION: 2

## 2020-01-08 ASSESSMENT — COLUMBIA-SUICIDE SEVERITY RATING SCALE - C-SSRS
2. HAVE YOU ACTUALLY HAD ANY THOUGHTS OF KILLING YOURSELF?: NO
6. HAVE YOU EVER DONE ANYTHING, STARTED TO DO ANYTHING, OR PREPARED TO DO ANYTHING TO END YOUR LIFE?: NO
1. WITHIN THE PAST MONTH, HAVE YOU WISHED YOU WERE DEAD OR WISHED YOU COULD GO TO SLEEP AND NOT WAKE UP?: NO

## 2020-01-08 NOTE — PROGRESS NOTES
Subjective:      Chief Complaint   Patient presents with    Cough     Pt is here for cough fever and congestion    Fever    Congestion       HPI:  Storm Plata is a 37 y.o. male who presents today for 1 month hx of nasal congestion and cough. Cough is productive with green sputum. He has had intermittent hot flashes and cold chills. Both ears are painful and he feels like he is under water. Throat is dried and irritated. He has tried Payal-seltzer cold and sinus with minimal relief. Has shortness with activity and laying in bed. Wheezing is worse when he is laying down. Smoking Cessation:   He smokes 1 PPD of cigarettes for about 30 years. He is interested in quitting. He has quit once in the past cold turkey for 4-5 years. He has tried Chantix in the past but it made him irritable. HTN:   Hx of HTN. Stopped medications over the summer and was doing well. However he was laid off and has gained weight. He reports frequent headaches and blurred vision at times. Denies lightheadedness, chest pain, palpitations, shortness of breath or edema. Depression/Anxiety:  He has stopped taking prozac and doxepin - he felt that he no longer needs these medications. His depression screen is up today. He states that there is a lot going on. He was laid off and as a result his home is probably going to be foreclosed. Money is tight which increases his stress. He continues to take Buspar which helps manage his anxiety. Denies AVH/SIB/SI/HI.       PHQ Scores 1/8/2020 8/22/2019 5/16/2019 5/1/2019 5/12/2017 4/8/2016   PHQ2 Score 6 3 0 0 0 5   PHQ9 Score 16 15 0 0 0 18     Interpretation of Total Score Depression Severity: 1-4 = Minimal depression, 5-9 = Mild depression, 10-14 = Moderate depression, 15-19 = Moderately severe depression, 20-27 = Severe depression    Past Medical History:   Diagnosis Date    Abnormal liver function tests     scheduled for liver bx 10/4/2013    Anxiety     Asthma     Chronic abdominal pain     Depression     FAP (familial adenomatous polyposis)     family hx of this disorder per h&p    Hepatitis C 2006    Hypertension     Kidney stone         Social History     Tobacco Use    Smoking status: Current Every Day Smoker     Packs/day: 0.50     Years: 7.00     Pack years: 3.50     Types: Cigarettes    Smokeless tobacco: Never Used   Substance Use Topics    Alcohol use: Not Currently     Comment: has not drank for over a year        Review of Systems   Constitutional: Positive for fatigue. Negative for activity change, appetite change, chills, diaphoresis and fever. HENT: Positive for congestion, ear pain, postnasal drip and sore throat (irritation). Negative for ear discharge, trouble swallowing and voice change. Eyes: Negative. Respiratory: Positive for cough, shortness of breath and wheezing. Negative for chest tightness and stridor. Cardiovascular: Negative. Gastrointestinal: Negative. Endocrine: Negative. Musculoskeletal: Positive for back pain. Negative for gait problem. Skin: Negative. Neurological: Positive for headaches. Negative for dizziness, tremors, weakness and light-headedness. Psychiatric/Behavioral: Positive for dysphoric mood and hallucinations. Negative for agitation, behavioral problems, confusion, decreased concentration, self-injury, sleep disturbance and suicidal ideas. The patient is nervous/anxious. The patient is not hyperactive. Objective:      BP (!) 144/100 (Site: Right Upper Arm, Position: Sitting, Cuff Size: Large Adult)   Pulse 88   Temp 97.7 °F (36.5 °C)   Resp 16   Ht 6' (1.829 m)   Wt 266 lb 6 oz (120.8 kg)   SpO2 96%   BMI 36.13 kg/m²      Physical Exam  Vitals signs reviewed. Constitutional:       Appearance: Normal appearance. He is obese. HENT:      Right Ear: Ear canal and external ear normal. A middle ear effusion is present.       Left Ear: Ear canal and external ear normal. A middle ear effusion is present. Nose: Mucosal edema and congestion present. Right Sinus: No maxillary sinus tenderness or frontal sinus tenderness. Left Sinus: No maxillary sinus tenderness or frontal sinus tenderness. Mouth/Throat:      Lips: Pink. Mouth: Mucous membranes are moist.      Pharynx: Oropharynx is clear. Posterior oropharyngeal erythema present. No oropharyngeal exudate. Pulmonary:      Effort: Pulmonary effort is normal. No respiratory distress. Breath sounds: Decreased air movement present. No stridor. Wheezing (throughout) present. No rhonchi or rales. Abdominal:      General: Bowel sounds are normal. There is no distension. Palpations: Abdomen is soft. Skin:     General: Skin is warm and dry. Capillary Refill: Capillary refill takes less than 2 seconds. Neurological:      General: No focal deficit present. Mental Status: He is alert and oriented to person, place, and time. Psychiatric:         Attention and Perception: Attention normal.         Mood and Affect: Mood is depressed. Speech: Speech normal.         Behavior: Behavior normal.         Thought Content: Thought content normal.         Cognition and Memory: Cognition and memory normal.         Judgment: Judgment normal.            Assessment / Plan:      1. Moderate persistent asthmatic bronchitis with acute exacerbation  Symptomatic care discussed. Return for new or worsening symptoms. - predniSONE (DELTASONE) 20 MG tablet; Take 2 tablets by mouth daily for 5 days  Dispense: 10 tablet; Refill: 0  - azithromycin (ZITHROMAX) 250 MG tablet; Take 1 tablet by mouth See Admin Instructions for 5 days 500mg on day 1 followed by 250mg on days 2 - 5  Dispense: 6 tablet; Refill: 0    2. Moderate episode of recurrent major depressive disorder (HCC)  Stable. Continue medication. Call if any concerns before your follow up appointment. If you have suicidal thoughts call our office.  If you have suicidal thoughts with a plan, go to emergency room immediately. - buPROPion (WELLBUTRIN SR) 150 MG extended release tablet; Take 1 Capsule daily x3 days then increase to 1 capsule twice a day  Dispense: 57 tablet; Refill: 3    3. Anxiety  Continue Buspar. Behavioral counseling recommended    4. Positive depression screening  - Positive Screen for Clinical Depression with a Documented Follow-up Plan     5. Essential hypertension  Monitor blood pressures. Goal is 130/80 or less. Bring your blood pressure recordings to your next appointment, or you can send them to us. Bring your home blood pressure machine with you to your next appointment. Exercise moderately, 30-45 minutes most days of the week. Lose weight if overweight. Increase your daily intake of grains, fresh fruits and vegetables. Reduce dietary sodium; less than 2.4 grams per day. If you smoke stop smoking. Limit alcohol intake. Reduce stress level  - amLODIPine (NORVASC) 5 MG tablet; Take 1 tablet by mouth daily  Dispense: 30 tablet; Refill: 0    6. Chronic left-sided low back pain with left-sided sciatica  - gabapentin (NEURONTIN) 600 MG tablet; Take 1 tablet by mouth 3 times daily for 90 days. Dispense: 90 tablet; Refill: 2  - ibuprofen (ADVIL;MOTRIN) 600 MG tablet; Take 1 tablet by mouth 4 times daily as needed for Pain  Dispense: 360 tablet; Refill: 1    7. Encounter for smoking cessation counseling  Patient counseled in length on smoking cessation including benefits of quitting and health risks of continuing to smoke including but not limited to lung cancer, COPD, risk of coronary artery disease. Patient verbalized understanding today    - buPROPion (WELLBUTRIN SR) 150 MG extended release tablet; Take 1 Capsule daily x3 days then increase to 1 capsule twice a day  Dispense: 57 tablet; Refill: 3  - nicotine (NICODERM CQ) 21 MG/24HR; Place 1 patch onto the skin daily  Dispense: 42 patch;  Refill: 0          MARAH Vides - CNP    On

## 2020-01-09 ASSESSMENT — ENCOUNTER SYMPTOMS
CHEST TIGHTNESS: 0
BACK PAIN: 1
COUGH: 1
EYES NEGATIVE: 1
SHORTNESS OF BREATH: 1
VOICE CHANGE: 0
STRIDOR: 0
WHEEZING: 1
GASTROINTESTINAL NEGATIVE: 1
SORE THROAT: 1
TROUBLE SWALLOWING: 0

## 2020-01-22 ENCOUNTER — HOSPITAL ENCOUNTER (OUTPATIENT)
Age: 40
Discharge: HOME | End: 2020-01-22
Payer: COMMERCIAL

## 2020-01-22 ENCOUNTER — OFFICE VISIT (OUTPATIENT)
Dept: FAMILY MEDICINE CLINIC | Age: 40
End: 2020-01-22
Payer: COMMERCIAL

## 2020-01-22 ENCOUNTER — HOSPITAL ENCOUNTER (OUTPATIENT)
Dept: GENERAL RADIOLOGY | Age: 40
Discharge: HOME | End: 2020-01-22
Payer: COMMERCIAL

## 2020-01-22 VITALS
BODY MASS INDEX: 36.03 KG/M2 | OXYGEN SATURATION: 97 % | HEART RATE: 87 BPM | RESPIRATION RATE: 18 BRPM | WEIGHT: 266 LBS | DIASTOLIC BLOOD PRESSURE: 78 MMHG | TEMPERATURE: 98 F | SYSTOLIC BLOOD PRESSURE: 122 MMHG | HEIGHT: 72 IN

## 2020-01-22 PROBLEM — R05.3 PERSISTENT COUGH FOR 3 WEEKS OR LONGER: Status: ACTIVE | Noted: 2020-01-22

## 2020-01-22 PROCEDURE — 96160 PT-FOCUSED HLTH RISK ASSMT: CPT | Performed by: NURSE PRACTITIONER

## 2020-01-22 PROCEDURE — 36415 COLL VENOUS BLD VENIPUNCTURE: CPT | Performed by: NURSE PRACTITIONER

## 2020-01-22 PROCEDURE — 71046 X-RAY EXAM CHEST 2 VIEWS: CPT

## 2020-01-22 PROCEDURE — G8427 DOCREV CUR MEDS BY ELIG CLIN: HCPCS | Performed by: NURSE PRACTITIONER

## 2020-01-22 PROCEDURE — 4004F PT TOBACCO SCREEN RCVD TLK: CPT | Performed by: NURSE PRACTITIONER

## 2020-01-22 PROCEDURE — 99214 OFFICE O/P EST MOD 30 MIN: CPT | Performed by: NURSE PRACTITIONER

## 2020-01-22 PROCEDURE — G8417 CALC BMI ABV UP PARAM F/U: HCPCS | Performed by: NURSE PRACTITIONER

## 2020-01-22 PROCEDURE — G8431 POS CLIN DEPRES SCRN F/U DOC: HCPCS | Performed by: NURSE PRACTITIONER

## 2020-01-22 PROCEDURE — G8484 FLU IMMUNIZE NO ADMIN: HCPCS | Performed by: NURSE PRACTITIONER

## 2020-01-22 RX ORDER — BUPROPION HYDROCHLORIDE 300 MG/1
300 TABLET ORAL EVERY MORNING
Qty: 30 TABLET | Refills: 3 | Status: SHIPPED | OUTPATIENT
Start: 2020-01-22 | End: 2020-04-14

## 2020-01-22 RX ORDER — AMLODIPINE BESYLATE 5 MG/1
5 TABLET ORAL DAILY
Qty: 30 TABLET | Refills: 3 | Status: SHIPPED | OUTPATIENT
Start: 2020-01-22 | End: 2020-04-21 | Stop reason: DRUGHIGH

## 2020-01-22 RX ORDER — CYCLOBENZAPRINE HCL 10 MG
5 TABLET ORAL 2 TIMES DAILY PRN
Qty: 28 TABLET | Refills: 0 | Status: SHIPPED | OUTPATIENT
Start: 2020-01-22 | End: 2020-02-27

## 2020-01-22 ASSESSMENT — PATIENT HEALTH QUESTIONNAIRE - PHQ9
SUM OF ALL RESPONSES TO PHQ QUESTIONS 1-9: 10
2. FEELING DOWN, DEPRESSED OR HOPELESS: 1
1. LITTLE INTEREST OR PLEASURE IN DOING THINGS: 3
5. POOR APPETITE OR OVEREATING: 1
6. FEELING BAD ABOUT YOURSELF - OR THAT YOU ARE A FAILURE OR HAVE LET YOURSELF OR YOUR FAMILY DOWN: 1
4. FEELING TIRED OR HAVING LITTLE ENERGY: 2
7. TROUBLE CONCENTRATING ON THINGS, SUCH AS READING THE NEWSPAPER OR WATCHING TELEVISION: 0
8. MOVING OR SPEAKING SO SLOWLY THAT OTHER PEOPLE COULD HAVE NOTICED. OR THE OPPOSITE, BEING SO FIGETY OR RESTLESS THAT YOU HAVE BEEN MOVING AROUND A LOT MORE THAN USUAL: 2
9. THOUGHTS THAT YOU WOULD BE BETTER OFF DEAD, OR OF HURTING YOURSELF: 0
10. IF YOU CHECKED OFF ANY PROBLEMS, HOW DIFFICULT HAVE THESE PROBLEMS MADE IT FOR YOU TO DO YOUR WORK, TAKE CARE OF THINGS AT HOME, OR GET ALONG WITH OTHER PEOPLE: 2
SUM OF ALL RESPONSES TO PHQ QUESTIONS 1-9: 10
SUM OF ALL RESPONSES TO PHQ9 QUESTIONS 1 & 2: 4
3. TROUBLE FALLING OR STAYING ASLEEP: 0

## 2020-01-22 NOTE — PROGRESS NOTES
postnasal drip. Negative for congestion, ear discharge, ear pain, sinus pressure, sinus pain, sore throat (irritation), trouble swallowing and voice change. Eyes: Negative. Respiratory: Positive for cough. Negative for chest tightness, shortness of breath, wheezing and stridor. Cardiovascular: Negative. Gastrointestinal: Negative. Endocrine: Negative. Musculoskeletal: Positive for back pain. Negative for gait problem. Skin: Negative. Neurological: Negative for dizziness, tremors, weakness, light-headedness and headaches. Psychiatric/Behavioral: Positive for dysphoric mood. Negative for agitation, behavioral problems, confusion, decreased concentration, hallucinations, self-injury, sleep disturbance and suicidal ideas. The patient is nervous/anxious. The patient is not hyperactive. Objective:      /78 (Site: Right Upper Arm, Position: Sitting, Cuff Size: Large Adult)   Pulse 87   Temp 98 °F (36.7 °C)   Resp 18   Ht 6' (1.829 m)   Wt 266 lb (120.7 kg)   SpO2 97%   BMI 36.08 kg/m²      Physical Exam  Vitals signs reviewed. Constitutional:       Appearance: Normal appearance. He is obese. HENT:      Right Ear: Ear canal and external ear normal. A middle ear effusion is present. Left Ear: Ear canal and external ear normal. A middle ear effusion is present. Nose: Mucosal edema present. Right Sinus: No maxillary sinus tenderness or frontal sinus tenderness. Left Sinus: No maxillary sinus tenderness or frontal sinus tenderness. Mouth/Throat:      Lips: Pink. Mouth: Mucous membranes are moist.      Pharynx: Oropharynx is clear. No oropharyngeal exudate or posterior oropharyngeal erythema. Eyes:      Extraocular Movements: Extraocular movements intact. Conjunctiva/sclera: Conjunctivae normal.      Pupils: Pupils are equal, round, and reactive to light. Neck:      Musculoskeletal: Normal range of motion and neck supple.       Vascular: No carotid bruit. Cardiovascular:      Rate and Rhythm: Normal rate and regular rhythm. Pulses: Normal pulses. Heart sounds: Normal heart sounds. Pulmonary:      Effort: Pulmonary effort is normal. No respiratory distress. Breath sounds: No stridor. Decreased breath sounds (throughout) present. No wheezing, rhonchi or rales. Abdominal:      General: Bowel sounds are normal. There is no distension. Palpations: Abdomen is soft. Lymphadenopathy:      Cervical: No cervical adenopathy. Skin:     General: Skin is warm and dry. Capillary Refill: Capillary refill takes less than 2 seconds. Neurological:      General: No focal deficit present. Mental Status: He is alert and oriented to person, place, and time. Psychiatric:         Attention and Perception: Attention normal.         Mood and Affect: Mood and affect normal.         Speech: Speech normal.         Behavior: Behavior normal.         Thought Content: Thought content normal.         Cognition and Memory: Cognition and memory normal.         Judgment: Judgment normal.            Assessment / Plan:      1. Essential hypertension  Monitor blood pressures. Goal is 130/80 or less. Bring your blood pressure recordings to your next appointment, or you can send them to us. Bring your home blood pressure machine with you to your next appointment. Exercise moderately, 30-45 minutes most days of the week. Lose weight if overweight. Increase your daily intake of grains, fresh fruits and vegetables. Reduce dietary sodium; less than 2.4 grams per day. If you smoke stop smoking. Limit alcohol intake. Reduce stress level  - Lipid, Fasting  - Comprehensive Metabolic Panel, Fasting  - CBC Auto Differential  - amLODIPine (NORVASC) 5 MG tablet; Take 1 tablet by mouth daily  Dispense: 30 tablet; Refill: 3  - Misc. Devices MISC; 1 each by Does not apply route once for 1 dose BP cuff/monitor  Dispense: 1 Device; Refill: 0    2. Persistent cough for 3 weeks or longer  Patient counseled in length on smoking cessation including benefits of quitting and health risks of continuing to smoke including but not limited to lung cancer, COPD, risk of coronary artery disease. Patient verbalized understanding today    - cyclobenzaprine (FLEXERIL) 10 MG tablet; Take 0.5 tablets by mouth 2 times daily as needed for Muscle spasms  Dispense: 28 tablet; Refill: 0    3. Positive depression screening  - Positive Screen for Clinical Depression with a Documented Follow-up Plan     4. Anxiety  Behavioral therapy recommended. Stable. Continue medication. Call if any concerns before your follow up appointment. If you have suicidal thoughts call our office. If you have suicidal thoughts with a plan, go to emergency room immediately. - buPROPion (WELLBUTRIN XL) 300 MG extended release tablet; Take 1 tablet by mouth every morning  Dispense: 30 tablet; Refill: 3          MARAH Calderon - CNP    On the basis of positive PHQ-9 screening (PHQ-9 Total Score: 10), the following plan was implemented: exercise program recommended for stress management, medication prescribed: Wellbutrin- 300 mg- patient will call for any significant medication side effects or worsening symptoms of depression and patient declines further evaluation/treatment for depression. Patient will follow-up in 3 month(s) with PCP.

## 2020-01-23 ENCOUNTER — NURSE ONLY (OUTPATIENT)
Dept: FAMILY MEDICINE CLINIC | Age: 40
End: 2020-01-23

## 2020-01-23 LAB
A/G RATIO: 1.6 (ref 1.1–2.2)
ALBUMIN SERPL-MCNC: 4.2 G/DL (ref 3.4–5)
ALP BLD-CCNC: 84 U/L (ref 40–129)
ALT SERPL-CCNC: 27 U/L (ref 10–40)
ANION GAP SERPL CALCULATED.3IONS-SCNC: 18 MMOL/L (ref 3–16)
AST SERPL-CCNC: 24 U/L (ref 15–37)
BASOPHILS ABSOLUTE: 0.1 K/UL (ref 0–0.2)
BASOPHILS RELATIVE PERCENT: 0.4 %
BILIRUB SERPL-MCNC: 0.3 MG/DL (ref 0–1)
BUN BLDV-MCNC: 15 MG/DL (ref 7–20)
CALCIUM SERPL-MCNC: 9.6 MG/DL (ref 8.3–10.6)
CHLORIDE BLD-SCNC: 101 MMOL/L (ref 99–110)
CHOLESTEROL, FASTING: 145 MG/DL (ref 0–199)
CO2: 22 MMOL/L (ref 21–32)
CREAT SERPL-MCNC: 0.8 MG/DL (ref 0.9–1.3)
EOSINOPHILS ABSOLUTE: 0.2 K/UL (ref 0–0.6)
EOSINOPHILS RELATIVE PERCENT: 1.2 %
GFR AFRICAN AMERICAN: >60
GFR NON-AFRICAN AMERICAN: >60
GLOBULIN: 2.6 G/DL
GLUCOSE FASTING: 104 MG/DL (ref 70–99)
HCT VFR BLD CALC: 50.7 % (ref 40.5–52.5)
HDLC SERPL-MCNC: 36 MG/DL (ref 40–60)
HEMOGLOBIN: 16.8 G/DL (ref 13.5–17.5)
LDL CHOLESTEROL CALCULATED: 84 MG/DL
LYMPHOCYTES ABSOLUTE: 4.4 K/UL (ref 1–5.1)
LYMPHOCYTES RELATIVE PERCENT: 30.9 %
MCH RBC QN AUTO: 28.9 PG (ref 26–34)
MCHC RBC AUTO-ENTMCNC: 33.1 G/DL (ref 31–36)
MCV RBC AUTO: 87.3 FL (ref 80–100)
MONOCYTES ABSOLUTE: 0.8 K/UL (ref 0–1.3)
MONOCYTES RELATIVE PERCENT: 5.5 %
NEUTROPHILS ABSOLUTE: 8.8 K/UL (ref 1.7–7.7)
NEUTROPHILS RELATIVE PERCENT: 62 %
PDW BLD-RTO: 16.4 % (ref 12.4–15.4)
PLATELET # BLD: 284 K/UL (ref 135–450)
PMV BLD AUTO: 8.5 FL (ref 5–10.5)
POTASSIUM SERPL-SCNC: 4.4 MMOL/L (ref 3.5–5.1)
RBC # BLD: 5.81 M/UL (ref 4.2–5.9)
SODIUM BLD-SCNC: 141 MMOL/L (ref 136–145)
TOTAL PROTEIN: 6.8 G/DL (ref 6.4–8.2)
TRIGLYCERIDE, FASTING: 125 MG/DL (ref 0–150)
VLDLC SERPL CALC-MCNC: 25 MG/DL
WBC # BLD: 14.2 K/UL (ref 4–11)

## 2020-01-23 RX ORDER — LORATADINE 10 MG/1
10 TABLET ORAL DAILY
Qty: 30 TABLET | Refills: 2 | Status: SHIPPED | OUTPATIENT
Start: 2020-01-23 | End: 2020-02-18

## 2020-01-23 ASSESSMENT — ENCOUNTER SYMPTOMS
COUGH: 1
SINUS PRESSURE: 0
SHORTNESS OF BREATH: 0
STRIDOR: 0
EYES NEGATIVE: 1
BACK PAIN: 1
CHEST TIGHTNESS: 0
SORE THROAT: 0
SINUS PAIN: 0
GASTROINTESTINAL NEGATIVE: 1
WHEEZING: 0
VOICE CHANGE: 0
TROUBLE SWALLOWING: 0

## 2020-02-24 ENCOUNTER — OFFICE VISIT (OUTPATIENT)
Dept: FAMILY MEDICINE CLINIC | Age: 40
End: 2020-02-24
Payer: COMMERCIAL

## 2020-02-24 VITALS
BODY MASS INDEX: 36.2 KG/M2 | WEIGHT: 267.25 LBS | OXYGEN SATURATION: 97 % | DIASTOLIC BLOOD PRESSURE: 80 MMHG | RESPIRATION RATE: 8 BRPM | TEMPERATURE: 97.9 F | HEIGHT: 72 IN | SYSTOLIC BLOOD PRESSURE: 140 MMHG | HEART RATE: 87 BPM

## 2020-02-24 PROBLEM — H66.001 NON-RECURRENT ACUTE SUPPURATIVE OTITIS MEDIA OF RIGHT EAR WITHOUT SPONTANEOUS RUPTURE OF TYMPANIC MEMBRANE: Status: ACTIVE | Noted: 2020-02-24

## 2020-02-24 PROBLEM — J01.41 ACUTE RECURRENT PANSINUSITIS: Status: ACTIVE | Noted: 2020-02-24

## 2020-02-24 PROCEDURE — 99213 OFFICE O/P EST LOW 20 MIN: CPT | Performed by: NURSE PRACTITIONER

## 2020-02-24 RX ORDER — METHYLPREDNISOLONE 4 MG/1
TABLET ORAL
Qty: 1 KIT | Refills: 0 | Status: SHIPPED | OUTPATIENT
Start: 2020-02-24 | End: 2020-03-01

## 2020-02-24 RX ORDER — LEVOFLOXACIN 500 MG/1
500 TABLET, FILM COATED ORAL DAILY
Qty: 7 TABLET | Refills: 0 | Status: SHIPPED | OUTPATIENT
Start: 2020-02-24 | End: 2020-03-02

## 2020-02-24 RX ORDER — MONTELUKAST SODIUM 10 MG/1
10 TABLET ORAL NIGHTLY
Qty: 30 TABLET | Refills: 5 | Status: SHIPPED | OUTPATIENT
Start: 2020-02-24 | End: 2020-05-05 | Stop reason: ALTCHOICE

## 2020-02-24 ASSESSMENT — ENCOUNTER SYMPTOMS
SORE THROAT: 1
STRIDOR: 0
SHORTNESS OF BREATH: 0
SINUS PAIN: 1
EYES NEGATIVE: 1
RHINORRHEA: 1
SINUS PRESSURE: 1
TROUBLE SWALLOWING: 0
COUGH: 1
CHEST TIGHTNESS: 0

## 2020-02-24 ASSESSMENT — PATIENT HEALTH QUESTIONNAIRE - PHQ9
2. FEELING DOWN, DEPRESSED OR HOPELESS: 1
1. LITTLE INTEREST OR PLEASURE IN DOING THINGS: 1
SUM OF ALL RESPONSES TO PHQ9 QUESTIONS 1 & 2: 2
SUM OF ALL RESPONSES TO PHQ QUESTIONS 1-9: 2
SUM OF ALL RESPONSES TO PHQ QUESTIONS 1-9: 2

## 2020-02-24 NOTE — PROGRESS NOTES
Subjective:      Chief Complaint   Patient presents with    Cough     Pt is here for sinus pain and pressure, cough and congestion for the past week    Congestion    Headache       HPI:   Billy Walker is a 37 y.o. male who presents for evaluation of sinus pain. Symptoms include: headache, fevers, ear pain,  nasal congestion with sinus pain and pressure that is worse on the left side, purulent rhinorrhea, foul breath, and frequent clearing of the throat. Onset of symptoms was 9 days ago. Symptoms have been gradually worsening since that time. Past history is significant for asthma and recurrent sinus infections. Referral placed to ENT on 01/29/2020 but patient has not be contacted to schedule an appointment. He has been taking Payal [place] gel tabs with no relief. Patient is a smoker. Past Medical History:   Diagnosis Date    Abnormal liver function tests     scheduled for liver bx 10/4/2013    Anxiety     Asthma     Chronic abdominal pain     Depression     FAP (familial adenomatous polyposis)     family hx of this disorder per h&p    Hepatitis C 2006    Hypertension     Kidney stone         Social History     Tobacco Use    Smoking status: Current Every Day Smoker     Packs/day: 0.50     Years: 7.00     Pack years: 3.50     Types: Cigarettes    Smokeless tobacco: Never Used   Substance Use Topics    Alcohol use: Not Currently     Comment: has not drank for over a year        Review of Systems   Constitutional: Positive for chills, fatigue and fever. HENT: Positive for congestion, ear pain, rhinorrhea, sinus pressure, sinus pain and sore throat. Negative for ear discharge and trouble swallowing. Eyes: Negative. Respiratory: Positive for cough. Negative for chest tightness, shortness of breath and stridor. Cardiovascular: Negative. Musculoskeletal: Negative. Skin: Negative. Neurological: Positive for headaches.            Objective:      BP (!) 140/80 (Site: Right Upper Arm,

## 2020-02-24 NOTE — PATIENT INSTRUCTIONS
Patient Education        Ear Infection (Otitis Media): Care Instructions  Your Care Instructions    An ear infection may start with a cold and affect the middle ear (otitis media). It can hurt a lot. Most ear infections clear up on their own in a couple of days. Most often you will not need antibiotics. This is because many ear infections are caused by a virus. Antibiotics don't work against a virus. Regular doses of pain medicines are the best way to reduce your fever and help you feel better. Follow-up care is a key part of your treatment and safety. Be sure to make and go to all appointments, and call your doctor if you are having problems. It's also a good idea to know your test results and keep a list of the medicines you take. How can you care for yourself at home? · Take pain medicines exactly as directed. ? If the doctor gave you a prescription medicine for pain, take it as prescribed. ? If you are not taking a prescription pain medicine, take an over-the-counter medicine, such as acetaminophen (Tylenol), ibuprofen (Advil, Motrin), or naproxen (Aleve). Read and follow all instructions on the label. ? Do not take two or more pain medicines at the same time unless the doctor told you to. Many pain medicines have acetaminophen, which is Tylenol. Too much acetaminophen (Tylenol) can be harmful. · Plan to take a full dose of pain reliever before bedtime. Getting enough sleep will help you get better. · Try a warm, moist washcloth on the ear. It may help relieve pain. · If your doctor prescribed antibiotics, take them as directed. Do not stop taking them just because you feel better. You need to take the full course of antibiotics. When should you call for help?   Call your doctor now or seek immediate medical care if:    · You have new or increasing ear pain.     · You have new or increasing pus or blood draining from your ear.     · You have a fever with a stiff neck or a severe headache.    Watch closely for changes in your health, and be sure to contact your doctor if:    · You have new or worse symptoms.     · You are not getting better after taking an antibiotic for 2 days. Where can you learn more? Go to https://[email or web address].[email or web address]. org and sign in to your [employer] account. Enter N168 in the [employer] box to learn more about \"Ear Infection (Otitis Media): Care Instructions. \"     If you do not have an account, please click on the \"Sign Up Now\" link. Current as of: July 28, 2019  Content Version: 12.3  © 3605-6368 Healthwise, Incorporated. Care instructions adapted under license by [facility] ([facility]). If you have questions about a medical condition or this instruction, always ask your healthcare professional. [address] any warranty or liability for your use of this information.

## 2020-02-27 RX ORDER — CYCLOBENZAPRINE HCL 10 MG
5 TABLET ORAL 2 TIMES DAILY PRN
Qty: 28 TABLET | Refills: 0 | Status: SHIPPED | OUTPATIENT
Start: 2020-02-27 | End: 2020-03-26

## 2020-03-15 ENCOUNTER — APPOINTMENT (OUTPATIENT)
Dept: GENERAL RADIOLOGY | Age: 40
End: 2020-03-15
Payer: COMMERCIAL

## 2020-03-15 ENCOUNTER — HOSPITAL ENCOUNTER (EMERGENCY)
Age: 40
Discharge: HOME | End: 2020-03-15
Attending: EMERGENCY MEDICINE
Payer: COMMERCIAL

## 2020-03-15 VITALS
HEART RATE: 87 BPM | RESPIRATION RATE: 12 BRPM | DIASTOLIC BLOOD PRESSURE: 84 MMHG | SYSTOLIC BLOOD PRESSURE: 139 MMHG | OXYGEN SATURATION: 97 % | BODY MASS INDEX: 35.21 KG/M2 | WEIGHT: 260 LBS | TEMPERATURE: 98.4 F | HEIGHT: 72 IN

## 2020-03-15 LAB
ADENOVIRUS DETECTION BY PCR: NOT DETECTED
BORDETELLA PERTUSSIS PCR: NOT DETECTED
CHLAMYDOPHILA PNEUMONIA PCR: NOT DETECTED
CORONAVIRUS 229E PCR: NOT DETECTED
CORONAVIRUS HKU1 PCR: NOT DETECTED
CORONAVIRUS NL63 PCR: NOT DETECTED
CORONAVIRUS OC43 PCR: NOT DETECTED
HUMAN METAPNEUMOVIRUS PCR: NOT DETECTED
INFLUENZA A BY PCR: NOT DETECTED
INFLUENZA A H1 (2009) PCR: NOT DETECTED
INFLUENZA A H1 PANDEMIC PCR: NOT DETECTED
INFLUENZA A H3 PCR: NOT DETECTED
INFLUENZA B BY PCR: NOT DETECTED
MYCOPLASMA PNEUMONIAE PCR: NOT DETECTED
PARAINFLUENZA 1 PCR: NOT DETECTED
PARAINFLUENZA 2 PCR: NOT DETECTED
PARAINFLUENZA 3 PCR: NOT DETECTED
PARAINFLUENZA 4 PCR: NOT DETECTED
RHINOVIRUS ENTEROVIRUS PCR: NOT DETECTED
RSV PCR: NOT DETECTED

## 2020-03-15 PROCEDURE — 87633 RESP VIRUS 12-25 TARGETS: CPT

## 2020-03-15 PROCEDURE — 87486 CHLMYD PNEUM DNA AMP PROBE: CPT

## 2020-03-15 PROCEDURE — 6370000000 HC RX 637 (ALT 250 FOR IP): Performed by: EMERGENCY MEDICINE

## 2020-03-15 PROCEDURE — 71046 X-RAY EXAM CHEST 2 VIEWS: CPT

## 2020-03-15 PROCEDURE — 87581 M.PNEUMON DNA AMP PROBE: CPT

## 2020-03-15 PROCEDURE — 87798 DETECT AGENT NOS DNA AMP: CPT

## 2020-03-15 PROCEDURE — 99283 EMERGENCY DEPT VISIT LOW MDM: CPT

## 2020-03-15 PROCEDURE — 94640 AIRWAY INHALATION TREATMENT: CPT

## 2020-03-15 RX ORDER — PREDNISONE 10 MG/1
TABLET ORAL
Qty: 42 TABLET | Refills: 0 | Status: SHIPPED | OUTPATIENT
Start: 2020-03-15 | End: 2020-03-31 | Stop reason: ALTCHOICE

## 2020-03-15 RX ORDER — IPRATROPIUM BROMIDE AND ALBUTEROL SULFATE 2.5; .5 MG/3ML; MG/3ML
1 SOLUTION RESPIRATORY (INHALATION) ONCE
Status: COMPLETED | OUTPATIENT
Start: 2020-03-15 | End: 2020-03-15

## 2020-03-15 RX ORDER — BENZONATATE 100 MG/1
100 CAPSULE ORAL 3 TIMES DAILY PRN
Qty: 20 CAPSULE | Refills: 0 | Status: SHIPPED | OUTPATIENT
Start: 2020-03-15 | End: 2020-03-22

## 2020-03-15 RX ORDER — BROMPHENIRAMINE MALEATE, PSEUDOEPHEDRINE HYDROCHLORIDE, AND DEXTROMETHORPHAN HYDROBROMIDE 2; 30; 10 MG/5ML; MG/5ML; MG/5ML
5 SYRUP ORAL 4 TIMES DAILY PRN
Qty: 120 ML | Refills: 0 | Status: SHIPPED | OUTPATIENT
Start: 2020-03-15 | End: 2020-03-25

## 2020-03-15 RX ORDER — BENZONATATE 100 MG/1
100 CAPSULE ORAL ONCE
Status: COMPLETED | OUTPATIENT
Start: 2020-03-15 | End: 2020-03-15

## 2020-03-15 RX ORDER — IPRATROPIUM BROMIDE AND ALBUTEROL SULFATE 2.5; .5 MG/3ML; MG/3ML
1 SOLUTION RESPIRATORY (INHALATION) EVERY 6 HOURS PRN
Qty: 360 ML | Refills: 0 | Status: SHIPPED | OUTPATIENT
Start: 2020-03-15

## 2020-03-15 RX ORDER — BLOOD-GLUCOSE METER
KIT MISCELLANEOUS
Qty: 1 EACH | Refills: 0 | Status: SHIPPED | OUTPATIENT
Start: 2020-03-15

## 2020-03-15 RX ADMIN — BENZONATATE 100 MG: 100 CAPSULE ORAL at 11:08

## 2020-03-15 RX ADMIN — IPRATROPIUM BROMIDE AND ALBUTEROL SULFATE 1 AMPULE: .5; 3 SOLUTION RESPIRATORY (INHALATION) at 11:19

## 2020-03-15 ASSESSMENT — PAIN SCALES - GENERAL: PAINLEVEL_OUTOF10: 6

## 2020-03-15 ASSESSMENT — PAIN DESCRIPTION - LOCATION: LOCATION: EAR

## 2020-03-15 ASSESSMENT — PAIN DESCRIPTION - ORIENTATION: ORIENTATION: RIGHT

## 2020-03-15 ASSESSMENT — PAIN DESCRIPTION - PAIN TYPE: TYPE: ACUTE PAIN

## 2020-03-15 NOTE — ED PROVIDER NOTES
possible for a visit in 3 days  For follow up    Discharge medications:  New Prescriptions    BENZONATATE (TESSALON) 100 MG CAPSULE    Take 1 capsule by mouth 3 times daily as needed for Cough    BROMPHENIRAMINE-PSEUDOEPHEDRINE-DM 2-30-10 MG/5ML SYRUP    Take 5 mLs by mouth 4 times daily as needed for Congestion or Cough Pharmacist may substitute    IPRATROPIUM-ALBUTEROL (DUONEB) 0.5-2.5 (3) MG/3ML SOLN NEBULIZER SOLUTION    Inhale 3 mLs into the lungs every 6 hours as needed for Shortness of Breath    NEBULIZER SYSTEM ALL-IN-ONE MISC    Nebulizer unit compressor unit for nebulized breathing treatments    PREDNISONE (DELTASONE) 10 MG TABLET    Take 6 tabs by mouth for 2 days, then take 5 tabs by mouth for 2 days, then take 4 tabs by mouth for 2 days, then take 3 tabs by mouth for 2 days, then take 2 tabs by mouth for 2 days, then take 1 tab by mouth for 2 days.      (Please note that portions of this note may have been completed with a voice recognition program. Efforts were made to edit the dictations but occasionally words are mis-transcribed.)    Philip Parada DO, [address] [facility]  Board certified in [facility] Alec Scott Raj[place], [place]  03/15/20 [id number]

## 2020-03-24 RX ORDER — GABAPENTIN 600 MG/1
600 TABLET ORAL 3 TIMES DAILY
Qty: 90 TABLET | Refills: 2 | Status: SHIPPED | OUTPATIENT
Start: 2020-03-24 | End: 2020-03-30

## 2020-03-31 ENCOUNTER — TELEMEDICINE (OUTPATIENT)
Dept: FAMILY MEDICINE CLINIC | Age: 40
End: 2020-03-31
Payer: COMMERCIAL

## 2020-03-31 ENCOUNTER — TELEPHONE (OUTPATIENT)
Dept: FAMILY MEDICINE CLINIC | Age: 40
End: 2020-03-31

## 2020-03-31 PROBLEM — J44.1 COPD WITH ACUTE EXACERBATION (HCC): Status: ACTIVE | Noted: 2020-03-31

## 2020-03-31 PROCEDURE — G8427 DOCREV CUR MEDS BY ELIG CLIN: HCPCS | Performed by: NURSE PRACTITIONER

## 2020-03-31 PROCEDURE — 99213 OFFICE O/P EST LOW 20 MIN: CPT | Performed by: NURSE PRACTITIONER

## 2020-03-31 RX ORDER — AZITHROMYCIN 250 MG/1
250 TABLET, FILM COATED ORAL SEE ADMIN INSTRUCTIONS
Qty: 6 TABLET | Refills: 0 | Status: SHIPPED | OUTPATIENT
Start: 2020-03-31 | End: 2020-04-05

## 2020-03-31 RX ORDER — ALBUTEROL SULFATE 90 UG/1
2 AEROSOL, METERED RESPIRATORY (INHALATION) EVERY 4 HOURS PRN
Qty: 1 INHALER | Refills: 5 | Status: SHIPPED | OUTPATIENT
Start: 2020-03-31 | End: 2021-07-19

## 2020-03-31 RX ORDER — BENZONATATE 200 MG/1
200 CAPSULE ORAL 3 TIMES DAILY PRN
Qty: 30 CAPSULE | Refills: 0 | Status: SHIPPED | OUTPATIENT
Start: 2020-03-31 | End: 2020-04-10

## 2020-03-31 RX ORDER — BROMPHENIRAMINE MALEATE, PSEUDOEPHEDRINE HYDROCHLORIDE, AND DEXTROMETHORPHAN HYDROBROMIDE 2; 30; 10 MG/5ML; MG/5ML; MG/5ML
5 SYRUP ORAL 4 TIMES DAILY PRN
Qty: 200 ML | Refills: 0 | Status: SHIPPED | OUTPATIENT
Start: 2020-03-31 | End: 2020-04-10

## 2020-03-31 ASSESSMENT — ENCOUNTER SYMPTOMS
SINUS PAIN: 0
SORE THROAT: 1
TROUBLE SWALLOWING: 0
WHEEZING: 1
SINUS PRESSURE: 0
COUGH: 1
GASTROINTESTINAL NEGATIVE: 1
CHEST TIGHTNESS: 0
SHORTNESS OF BREATH: 1
RHINORRHEA: 0

## 2020-03-31 NOTE — TELEPHONE ENCOUNTER
Patient called and stated he is having SOB, cough for 2 days this morning he stated having pain in his upper back in his lung area. He is unsure if he is running a fever. 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  Please advise

## 2020-03-31 NOTE — PROGRESS NOTES
Congestion or Cough Yes MARAH Turner CNP   benzonatate (TESSALON) 200 MG capsule Take 1 capsule by mouth 3 times daily as needed for Cough Yes MARAH Turner CNP   azithromycin (ZITHROMAX) 250 MG tablet Take 1 tablet by mouth See Admin Instructions for 5 days 500mg on day 1 followed by 250mg on days 2 - 5 Yes MARAH Turner CNP   gabapentin (NEURONTIN) 600 MG tablet Take 1 tablet by mouth 3 times daily for 90 days. MARAH Head CNP   ipratropium-albuterol (DUONEB) 0.5-2.5 (3) MG/3ML SOLN nebulizer solution Inhale 3 mLs into the lungs every 6 hours as needed for Shortness of Breath  Eleanor Jang DO   Nebulizer System All-In-One MISC Nebulizer unit compressor unit for nebulized breathing treatments  Eleanor Jang DO   montelukast (SINGULAIR) 10 MG tablet Take 1 tablet by mouth nightly  MARAH Turner CNP   loratadine (CLARITIN) 10 MG tablet TAKE 1 TABLET BY MOUTH EVERY DAY  MARAH Turner CNP   amLODIPine (NORVASC) 5 MG tablet Take 1 tablet by mouth daily  MARAH Turner CNP   buPROPion (WELLBUTRIN XL) 300 MG extended release tablet Take 1 tablet by mouth every morning  MARAH Turner CNP   Misc.  Devices MISC 1 each by Does not apply route once for 1 dose BP cuff/monitor  MARAH Turner CNP   ibuprofen (ADVIL;MOTRIN) 600 MG tablet Take 1 tablet by mouth 4 times daily as needed for Pain  Patient taking differently: Take 600 mg by mouth 3 times daily States takes 800 mg three times a day  MARAH Turner CNP   nicotine (NICODERM CQ) 21 MG/24HR Place 1 patch onto the skin daily  Patient not taking: Reported on 1/22/2020  MARAH Head CNP       Social History     Tobacco Use    Smoking status: Current Every Day Smoker     Packs/day: 0.50     Years: 7.00     Pack years: 3.50     Types: Cigarettes    Smokeless tobacco: Never Used   Substance Use Topics    Alcohol use: Not Currently     Comment: has not drank for over a year    Drug use: No     Types: Cocaine, IV, Other-see comments     Comment: denies any use since Feb 2015        Past Medical History:   Diagnosis Date    Abnormal liver function tests     scheduled for liver bx 10/4/2013    Anxiety     Asthma     Chronic abdominal pain     Depression     FAP (familial adenomatous polyposis)     family hx of this disorder per h&p    Hepatitis C 2006    Hypertension     Kidney stone    ,   Social History     Tobacco Use    Smoking status: Current Every Day Smoker     Packs/day: 0.50     Years: 7.00     Pack years: 3.50     Types: Cigarettes    Smokeless tobacco: Never Used   Substance Use Topics    Alcohol use: Not Currently     Comment: has not drank for over a year    Drug use: No     Types: Cocaine, IV, Other-see comments     Comment: denies any use since Feb 2015       PHYSICAL EXAMINATION:  [ INSTRUCTIONS:  \"[x]\" Indicates a positive item  \"[]\" Indicates a negative item  -- DELETE ALL ITEMS NOT EXAMINED]    Constitutional: [x] Appears well-developed and well-nourished [x] No apparent distress      [] Abnormal-   Mental status  [x] Alert and awake  [x] Oriented to person/place/time [x]Able to follow commands      Eyes:  EOM    [x]  Normal  [] Abnormal-  Sclera  [x]  Normal  [] Abnormal -         Discharge [x]  None visible  [] Abnormal -    HENT:   [x] Normocephalic, atraumatic.   [] Abnormal   [x] Mouth/Throat: Mucous membranes are moist.     External Ears [x] Normal  [] Abnormal-     Neck: [x] No visualized mass     Pulmonary/Chest: [x] Respiratory effort normal.  [x] No visualized signs of difficulty breathing or respiratory distress        [] Abnormal-      Musculoskeletal:   [x] Normal gait with no signs of ataxia         [] Normal range of motion of neck        [] Abnormal-       Neurological:        [x] No Facial Asymmetry (Cranial nerve 7 motor function) (limited exam to video visit)          [] No gaze palsy        [] Abnormal-         Skin:        [x] No APRN - CNP on 3/31/2020 at 10:34 AM        Pursuant to the emergency declaration under the [address] [place] [address] authority and the [employer] and [employer] Act, this Virtual  Visit was conducted, with patient's consent, to reduce the patient's risk of exposure to COVID-19 and provide continuity of care for an established patient. Services were provided through a video synchronous discussion virtually to substitute for in-person clinic visit. Pursuant to the emergency declaration under the [address] [place] [address] authority and the [employer] and [employer] Act, this Virtual  Visit was conducted, with patient's consent, to reduce the patient's risk of exposure to COVID-19 and provide continuity of care for an established patient. Services were provided through a video synchronous discussion virtually to substitute for in-person clinic visit.

## 2020-04-14 RX ORDER — BUPROPION HYDROCHLORIDE 300 MG/1
TABLET ORAL
Qty: 30 TABLET | Refills: 3 | Status: SHIPPED | OUTPATIENT
Start: 2020-04-14 | End: 2020-07-10

## 2020-04-21 ENCOUNTER — VIRTUAL VISIT (OUTPATIENT)
Dept: FAMILY MEDICINE CLINIC | Age: 40
End: 2020-04-21
Payer: COMMERCIAL

## 2020-04-21 VITALS
OXYGEN SATURATION: 97 % | HEART RATE: 83 BPM | SYSTOLIC BLOOD PRESSURE: 148 MMHG | DIASTOLIC BLOOD PRESSURE: 96 MMHG | WEIGHT: 262.2 LBS | RESPIRATION RATE: 16 BRPM | BODY MASS INDEX: 35.56 KG/M2 | TEMPERATURE: 97.6 F

## 2020-04-21 DIAGNOSIS — N52.8 OTHER MALE ERECTILE DYSFUNCTION: ICD-10-CM

## 2020-04-21 PROCEDURE — 4004F PT TOBACCO SCREEN RCVD TLK: CPT | Performed by: NURSE PRACTITIONER

## 2020-04-21 PROCEDURE — 99214 OFFICE O/P EST MOD 30 MIN: CPT | Performed by: NURSE PRACTITIONER

## 2020-04-21 PROCEDURE — G8417 CALC BMI ABV UP PARAM F/U: HCPCS | Performed by: NURSE PRACTITIONER

## 2020-04-21 PROCEDURE — G8427 DOCREV CUR MEDS BY ELIG CLIN: HCPCS | Performed by: NURSE PRACTITIONER

## 2020-04-21 RX ORDER — AMLODIPINE BESYLATE 10 MG/1
10 TABLET ORAL DAILY
Qty: 90 TABLET | Refills: 1 | Status: SHIPPED | OUTPATIENT
Start: 2020-04-21 | End: 2020-05-05 | Stop reason: SINTOL

## 2020-04-21 ASSESSMENT — PATIENT HEALTH QUESTIONNAIRE - PHQ9
7. TROUBLE CONCENTRATING ON THINGS, SUCH AS READING THE NEWSPAPER OR WATCHING TELEVISION: 1
SUM OF ALL RESPONSES TO PHQ QUESTIONS 1-9: 9
2. FEELING DOWN, DEPRESSED OR HOPELESS: 0
5. POOR APPETITE OR OVEREATING: 0
SUM OF ALL RESPONSES TO PHQ QUESTIONS 1-9: 9
6. FEELING BAD ABOUT YOURSELF - OR THAT YOU ARE A FAILURE OR HAVE LET YOURSELF OR YOUR FAMILY DOWN: 0
8. MOVING OR SPEAKING SO SLOWLY THAT OTHER PEOPLE COULD HAVE NOTICED. OR THE OPPOSITE, BEING SO FIGETY OR RESTLESS THAT YOU HAVE BEEN MOVING AROUND A LOT MORE THAN USUAL: 2
9. THOUGHTS THAT YOU WOULD BE BETTER OFF DEAD, OR OF HURTING YOURSELF: 0
3. TROUBLE FALLING OR STAYING ASLEEP: 0
1. LITTLE INTEREST OR PLEASURE IN DOING THINGS: 3
4. FEELING TIRED OR HAVING LITTLE ENERGY: 3
SUM OF ALL RESPONSES TO PHQ9 QUESTIONS 1 & 2: 3

## 2020-04-21 ASSESSMENT — ENCOUNTER SYMPTOMS
VOMITING: 0
CONSTIPATION: 0
ABDOMINAL PAIN: 1
NAUSEA: 1
BACK PAIN: 1
RESPIRATORY NEGATIVE: 1

## 2020-04-21 NOTE — PATIENT INSTRUCTIONS
Patient Education        Low-Fat Diet for Gallbladder Disease: Care Instructions  Your Care Instructions    When you eat, the gallbladder releases bile, which helps you digest the fat in food. If you have an inflamed gallbladder, this may cause pain. A low-fat diet may give your gallbladder a rest so you can start to heal. Your doctor and dietitian can help you make an eating plan that does not irritate your digestive system. Always talk with your doctor or dietitian before you make changes in your diet. Follow-up care is a key part of your treatment and safety. Be sure to make and go to all appointments, and call your doctor if you are having problems. It's also a good idea to know your test results and keep a list of the medicines you take. How can you care for yourself at home? · Eat many small meals and snacks each day instead of three large meals. · Choose lean meats. ? Eat no more than 5 to 6½ ounces of meat a day. ? Cut off all fat you can see. ? Eat chicken and turkey without the skin. ? Many types of fish, such as salmon, lake trout, tuna, and herring, provide healthy omega-3 fat. But, avoid fish canned in oil, such as sardines in olive oil. ? Bake, broil, or grill meats, poultry, or fish instead of frying them in butter or fat. · Drink or eat nonfat or low-fat milk, yogurt, cheese, or other milk products each day. ? Read the labels on cheeses, and choose those with less than 5 grams of fat an ounce. ? Try fat-free sour cream, cream cheese, or yogurt. ? Avoid cream soups and cream sauces on pasta. ? Eat low-fat ice cream, frozen yogurt, or sorbet. Avoid regular ice cream.  · Eat whole-grain cereals, breads, crackers, rice, or pasta. Avoid high-fat foods such as croissants, scones, biscuits, waffles, doughnuts, muffins, granola, and high-fat breads. · Flavor your foods with herbs and spices (such as basil, tarragon, or mint), fat-free sauces, or lemon juice instead of butter.  You can also

## 2020-04-27 ENCOUNTER — HOSPITAL ENCOUNTER (OUTPATIENT)
Dept: ULTRASOUND IMAGING | Age: 40
Discharge: HOME | End: 2020-04-27
Payer: COMMERCIAL

## 2020-04-27 PROCEDURE — 76705 ECHO EXAM OF ABDOMEN: CPT

## 2020-04-29 DIAGNOSIS — K76.0 HEPATIC STEATOSIS: ICD-10-CM

## 2020-04-29 DIAGNOSIS — N52.8 OTHER MALE ERECTILE DYSFUNCTION: ICD-10-CM

## 2020-04-29 LAB
A/G RATIO: 1.5 (ref 1.1–2.2)
ALBUMIN SERPL-MCNC: 3.8 G/DL (ref 3.4–5)
ALP BLD-CCNC: 101 U/L (ref 40–129)
ALT SERPL-CCNC: 23 U/L (ref 10–40)
ANION GAP SERPL CALCULATED.3IONS-SCNC: 15 MMOL/L (ref 3–16)
AST SERPL-CCNC: 33 U/L (ref 15–37)
BASOPHILS ABSOLUTE: 0.1 K/UL (ref 0–0.2)
BASOPHILS RELATIVE PERCENT: 0.5 %
BILIRUB SERPL-MCNC: 0.4 MG/DL (ref 0–1)
BUN BLDV-MCNC: 23 MG/DL (ref 7–20)
CALCIUM SERPL-MCNC: 9.4 MG/DL (ref 8.3–10.6)
CHLORIDE BLD-SCNC: 105 MMOL/L (ref 99–110)
CO2: 21 MMOL/L (ref 21–32)
CREAT SERPL-MCNC: 0.7 MG/DL (ref 0.9–1.3)
EOSINOPHILS ABSOLUTE: 0.1 K/UL (ref 0–0.6)
EOSINOPHILS RELATIVE PERCENT: 0.7 %
GFR AFRICAN AMERICAN: >60
GFR NON-AFRICAN AMERICAN: >60
GLOBULIN: 2.6 G/DL
GLUCOSE FASTING: 113 MG/DL (ref 70–99)
HCT VFR BLD CALC: 47.8 % (ref 40.5–52.5)
HEMOGLOBIN: 15.7 G/DL (ref 13.5–17.5)
LYMPHOCYTES ABSOLUTE: 3.7 K/UL (ref 1–5.1)
LYMPHOCYTES RELATIVE PERCENT: 36.9 %
MCH RBC QN AUTO: 29.5 PG (ref 26–34)
MCHC RBC AUTO-ENTMCNC: 32.9 G/DL (ref 31–36)
MCV RBC AUTO: 89.5 FL (ref 80–100)
MONOCYTES ABSOLUTE: 0.6 K/UL (ref 0–1.3)
MONOCYTES RELATIVE PERCENT: 6.5 %
NEUTROPHILS ABSOLUTE: 5.5 K/UL (ref 1.7–7.7)
NEUTROPHILS RELATIVE PERCENT: 55.4 %
PDW BLD-RTO: 15.7 % (ref 12.4–15.4)
PLATELET # BLD: 258 K/UL (ref 135–450)
PMV BLD AUTO: 8.2 FL (ref 5–10.5)
POTASSIUM SERPL-SCNC: 3.8 MMOL/L (ref 3.5–5.1)
RBC # BLD: 5.33 M/UL (ref 4.2–5.9)
SODIUM BLD-SCNC: 141 MMOL/L (ref 136–145)
T4 FREE: 0.9 NG/DL (ref 0.9–1.8)
TOTAL PROTEIN: 6.4 G/DL (ref 6.4–8.2)
TSH SERPL DL<=0.05 MIU/L-ACNC: 2.92 UIU/ML (ref 0.27–4.2)
WBC # BLD: 9.9 K/UL (ref 4–11)

## 2020-04-30 DIAGNOSIS — Z86.19 HISTORY OF HEPATITIS C: ICD-10-CM

## 2020-04-30 DIAGNOSIS — R10.11 RUQ ABDOMINAL PAIN: ICD-10-CM

## 2020-04-30 LAB
HAV IGM SER IA-ACNC: ABNORMAL
HEPATITIS B CORE IGM ANTIBODY: ABNORMAL
HEPATITIS B SURFACE ANTIGEN INTERPRETATION: ABNORMAL
HEPATITIS C ANTIBODY INTERPRETATION: REACTIVE

## 2020-05-01 LAB — TESTOSTERONE TOTAL: 179 NG/DL (ref 220–1000)

## 2020-05-02 LAB
HCV QNT BY NAAT IU/ML: NOT DETECTED IU/ML
HCV QNT BY NAAT LOG IU/ML: NOT DETECTED LOG IU/ML
INTERPRETATION: NOT DETECTED

## 2020-05-05 ENCOUNTER — OFFICE VISIT (OUTPATIENT)
Dept: FAMILY MEDICINE CLINIC | Age: 40
End: 2020-05-05
Payer: COMMERCIAL

## 2020-05-05 VITALS
WEIGHT: 266.4 LBS | DIASTOLIC BLOOD PRESSURE: 82 MMHG | BODY MASS INDEX: 36.13 KG/M2 | TEMPERATURE: 98.2 F | HEART RATE: 88 BPM | RESPIRATION RATE: 16 BRPM | SYSTOLIC BLOOD PRESSURE: 144 MMHG | OXYGEN SATURATION: 98 %

## 2020-05-05 PROBLEM — R31.9 HEMATURIA: Status: RESOLVED | Noted: 2018-03-27 | Resolved: 2020-05-05

## 2020-05-05 PROBLEM — F11.91 HISTORY OF HEROIN USE: Status: RESOLVED | Noted: 2018-07-16 | Resolved: 2020-05-05

## 2020-05-05 PROBLEM — J40 BRONCHITIS: Status: RESOLVED | Noted: 2020-01-08 | Resolved: 2020-05-05

## 2020-05-05 PROBLEM — J01.41 ACUTE RECURRENT PANSINUSITIS: Status: RESOLVED | Noted: 2020-02-24 | Resolved: 2020-05-05

## 2020-05-05 PROBLEM — R79.89 LOW TESTOSTERONE IN MALE: Status: ACTIVE | Noted: 2020-05-05

## 2020-05-05 PROBLEM — H66.001 NON-RECURRENT ACUTE SUPPURATIVE OTITIS MEDIA OF RIGHT EAR WITHOUT SPONTANEOUS RUPTURE OF TYMPANIC MEMBRANE: Status: RESOLVED | Noted: 2020-02-24 | Resolved: 2020-05-05

## 2020-05-05 PROBLEM — Z87.442 HISTORY OF KIDNEY STONES: Status: RESOLVED | Noted: 2018-03-27 | Resolved: 2020-05-05

## 2020-05-05 PROCEDURE — 4004F PT TOBACCO SCREEN RCVD TLK: CPT | Performed by: NURSE PRACTITIONER

## 2020-05-05 PROCEDURE — G8428 CUR MEDS NOT DOCUMENT: HCPCS | Performed by: NURSE PRACTITIONER

## 2020-05-05 PROCEDURE — 99213 OFFICE O/P EST LOW 20 MIN: CPT | Performed by: NURSE PRACTITIONER

## 2020-05-05 PROCEDURE — G8417 CALC BMI ABV UP PARAM F/U: HCPCS | Performed by: NURSE PRACTITIONER

## 2020-05-05 RX ORDER — IBUPROFEN 600 MG/1
600 TABLET ORAL EVERY 8 HOURS PRN
Qty: 360 TABLET | Refills: 1 | Status: SHIPPED | OUTPATIENT
Start: 2020-05-05 | End: 2020-05-22 | Stop reason: ALTCHOICE

## 2020-05-05 RX ORDER — LOSARTAN POTASSIUM AND HYDROCHLOROTHIAZIDE 12.5; 5 MG/1; MG/1
1 TABLET ORAL DAILY
Qty: 30 TABLET | Refills: 0 | Status: SHIPPED | OUTPATIENT
Start: 2020-05-05 | End: 2020-05-26 | Stop reason: SDUPTHER

## 2020-05-06 ASSESSMENT — ENCOUNTER SYMPTOMS: RESPIRATORY NEGATIVE: 1

## 2020-05-12 ENCOUNTER — HOSPITAL ENCOUNTER (OUTPATIENT)
Age: 40
Discharge: HOME | End: 2020-05-12
Payer: COMMERCIAL

## 2020-05-12 ENCOUNTER — HOSPITAL ENCOUNTER (OUTPATIENT)
Dept: GENERAL RADIOLOGY | Age: 40
Discharge: HOME | End: 2020-05-12
Payer: COMMERCIAL

## 2020-05-12 LAB
ANION GAP SERPL CALCULATED.3IONS-SCNC: 20 MMOL/L (ref 4–16)
BUN BLDV-MCNC: 16 MG/DL (ref 6–23)
CALCIUM SERPL-MCNC: 9.8 MG/DL (ref 8.3–10.6)
CHLORIDE BLD-SCNC: 102 MMOL/L (ref 99–110)
CO2: 19 MMOL/L (ref 21–32)
CREAT SERPL-MCNC: 0.9 MG/DL (ref 0.9–1.3)
GFR AFRICAN AMERICAN: >60 ML/MIN/1.73M2
GFR NON-AFRICAN AMERICAN: >60 ML/MIN/1.73M2
GLUCOSE BLD-MCNC: 96 MG/DL (ref 70–99)
POTASSIUM SERPL-SCNC: 3.7 MMOL/L (ref 3.5–5.1)
SODIUM BLD-SCNC: 141 MMOL/L (ref 135–145)

## 2020-05-12 PROCEDURE — 36415 COLL VENOUS BLD VENIPUNCTURE: CPT

## 2020-05-12 PROCEDURE — 80048 BASIC METABOLIC PNL TOTAL CA: CPT

## 2020-05-12 PROCEDURE — 72050 X-RAY EXAM NECK SPINE 4/5VWS: CPT

## 2020-05-22 ENCOUNTER — OFFICE VISIT (OUTPATIENT)
Dept: FAMILY MEDICINE CLINIC | Age: 40
End: 2020-05-22
Payer: COMMERCIAL

## 2020-05-22 VITALS
HEART RATE: 97 BPM | BODY MASS INDEX: 35.4 KG/M2 | DIASTOLIC BLOOD PRESSURE: 88 MMHG | RESPIRATION RATE: 16 BRPM | OXYGEN SATURATION: 98 % | WEIGHT: 261 LBS | TEMPERATURE: 97.4 F | SYSTOLIC BLOOD PRESSURE: 132 MMHG

## 2020-05-22 PROCEDURE — G8417 CALC BMI ABV UP PARAM F/U: HCPCS | Performed by: NURSE PRACTITIONER

## 2020-05-22 PROCEDURE — 99214 OFFICE O/P EST MOD 30 MIN: CPT | Performed by: NURSE PRACTITIONER

## 2020-05-22 PROCEDURE — G8427 DOCREV CUR MEDS BY ELIG CLIN: HCPCS | Performed by: NURSE PRACTITIONER

## 2020-05-22 PROCEDURE — 3023F SPIROM DOC REV: CPT | Performed by: NURSE PRACTITIONER

## 2020-05-22 PROCEDURE — 4004F PT TOBACCO SCREEN RCVD TLK: CPT | Performed by: NURSE PRACTITIONER

## 2020-05-22 PROCEDURE — G8926 SPIRO NO PERF OR DOC: HCPCS | Performed by: NURSE PRACTITIONER

## 2020-05-22 RX ORDER — GUAIFENESIN 600 MG/1
600 TABLET, EXTENDED RELEASE ORAL 2 TIMES DAILY
Qty: 60 TABLET | Refills: 1 | Status: SHIPPED | OUTPATIENT
Start: 2020-05-22 | End: 2020-07-21

## 2020-05-22 RX ORDER — TESTOSTERONE 12.5 MG/1.25G
5 GEL TOPICAL DAILY
Qty: 1 PACKAGE | Refills: 3 | Status: SHIPPED | OUTPATIENT
Start: 2020-05-22 | End: 2020-07-15

## 2020-05-22 RX ORDER — MELOXICAM 7.5 MG/1
7.5 TABLET ORAL DAILY
Qty: 30 TABLET | Refills: 3 | Status: SHIPPED | OUTPATIENT
Start: 2020-05-22 | End: 2020-07-18

## 2020-05-22 RX ORDER — TIZANIDINE 4 MG/1
4 TABLET ORAL 3 TIMES DAILY
Qty: 42 TABLET | Refills: 0 | Status: SHIPPED | OUTPATIENT
Start: 2020-05-22 | End: 2020-06-05

## 2020-05-22 NOTE — PROGRESS NOTES
adenomatous polyposis)     family hx of this disorder per h&p    Hepatitis C 2006    History of heroin use 7/16/2018    In the ER February 2015 with acute heroin overdose    History of kidney stones 3/27/2018    Hypertension     Kidney stone         Social History     Tobacco Use    Smoking status: Current Every Day Smoker     Packs/day: 0.50     Years: 7.00     Pack years: 3.50     Types: Cigarettes    Smokeless tobacco: Never Used   Substance Use Topics    Alcohol use: Not Currently     Comment: has not drank for over a year        Review of Systems   Constitutional: Negative. Respiratory: Negative. Cardiovascular: Negative. Genitourinary: Negative. Musculoskeletal: Positive for arthralgias, joint swelling, myalgias, neck pain and neck stiffness. Skin: Negative. Neurological: Negative. Objective:      /88 (Site: Left Upper Arm, Position: Sitting, Cuff Size: Large Adult)   Pulse 97   Temp 97.4 °F (36.3 °C) (Temporal)   Resp 16   Wt 261 lb (118.4 kg)   SpO2 98%   BMI 35.40 kg/m²      Physical Exam  Vitals signs reviewed. Constitutional:       Appearance: Normal appearance. He is obese. HENT:      Head: Normocephalic and atraumatic. Nose: Nose normal.      Mouth/Throat:      Mouth: Mucous membranes are moist.      Pharynx: Oropharynx is clear. Eyes:      Extraocular Movements: Extraocular movements intact. Conjunctiva/sclera: Conjunctivae normal.      Pupils: Pupils are equal, round, and reactive to light. Neck:      Musculoskeletal: Normal range of motion and neck supple. Vascular: No carotid bruit. Cardiovascular:      Rate and Rhythm: Normal rate and regular rhythm. Heart sounds: Normal heart sounds. Pulmonary:      Effort: Pulmonary effort is normal.      Breath sounds: Decreased breath sounds (throughout) present. Abdominal:      General: Bowel sounds are normal.      Palpations: Abdomen is soft.    Skin:     General: Skin is warm Apply 5 g topically daily for 30 days. Dispense: 1 Package;  Refill: 3          MARAH Ulloa - CNP

## 2020-05-26 RX ORDER — LOSARTAN POTASSIUM AND HYDROCHLOROTHIAZIDE 12.5; 5 MG/1; MG/1
1 TABLET ORAL DAILY
Qty: 30 TABLET | Refills: 0 | Status: SHIPPED | OUTPATIENT
Start: 2020-05-26 | End: 2020-05-29

## 2020-05-26 ASSESSMENT — ENCOUNTER SYMPTOMS: RESPIRATORY NEGATIVE: 1

## 2020-06-09 ENCOUNTER — HOSPITAL ENCOUNTER (OUTPATIENT)
Dept: PHYSICAL THERAPY | Age: 40
Setting detail: THERAPIES SERIES
Discharge: HOME | End: 2020-06-09
Payer: COMMERCIAL

## 2020-06-09 PROCEDURE — 97162 PT EVAL MOD COMPLEX 30 MIN: CPT

## 2020-06-09 PROCEDURE — 97530 THERAPEUTIC ACTIVITIES: CPT

## 2020-06-09 PROCEDURE — 97110 THERAPEUTIC EXERCISES: CPT

## 2020-06-09 NOTE — PROGRESS NOTES
Patient Education: Pt educated on condition and plan of care. Pt educated on HEP consisting of cervical retraction 4x/day and supine towel roll stretch daily 2x 3-5'. Pt expressed understanding. Pt expressed motivation to get better. Plan for next session: Consider cervical traction, and take cervical ROM measurements. Continue with POC. Plan   Plan of care initiated  Frequency and duration of tx:  2+/week x12 weeks  Barriers include: none  Treatment:  1. Therapeutic exercises including ROM, PREs, stretching, strengthening, and stability  2. Therapeutic activity  3. Neuro re-ed  4. Coordination training and body awareness  5. Positioning and postural awareness  6. Modalities including e-stim, ultrasound, heat, cold, taping, and traction  7. Manual therapy including: STM, MFR, and TPR  8. Aquatic Therapy   9. HEP and education    Goals  Short term goals to be achieved by July 21,2020:  Short term goal 1: Pt will report compliance with current HEP as prescribed in order to improve ROM and decrease pain. Short term goal 2: Pt will report headaches no more than 3x/week in order to improve quality of life. Short term goal 3: Pt will demonstrate cervical strength greater than or equal to 4/5 in order to improve strength. Short term goal 4: Pt will demonstrate R  strength greater than or equal to L in order to improve strength. Short term goal 5: Pt will demonstrate a score indicating moderate disability on the NDI in order to improve quality of life. Long term goals to be achieved by September 1, 2020:   Long term goal 1: Pt will demonstrate I with current HEP as prescribed in order to improve strength. Long term goal 2: Pt will demonstrate R UE strength greater than or equal to 4+/5 in order to improve strength. Long term goal 3: Pt will demonstrate a score indicating mild  disability on the NDI in order to improve quality of life.        Note: Goals, frequency, plan, and recommendations will be updated as needed. Goals and treatment plan discussed with family and mutually agreed upon. YES   Will discharge patient when therapy goals have been met or when therapy is no longer deemed necessary. This plan was reviewed with the patient/family and they were in agreement. Time in: 10:15 am   Time out: 11:10 am/ 1mod eval, 2 therapeutic exercise, 1 therapeutic activity     Electronically signed by:  Davonte Robbins DPT [id number] Date: 6/9/2020, Time: 46:28 AM      I certify that the above patient is under my care and requires the above skilled services. These professional services are to be provided from an established plan, reviewed by me at least every 90 days. These services are related to the diagnosis stated above and are medically necessary.     Date last seen by physician:_________________________________________________    Physician Signature:____________________________________________Date:_______________

## 2020-06-10 ENCOUNTER — HOSPITAL ENCOUNTER (OUTPATIENT)
Dept: PHYSICAL THERAPY | Age: 40
Setting detail: THERAPIES SERIES
Discharge: HOME | End: 2020-06-10
Payer: COMMERCIAL

## 2020-06-10 PROCEDURE — 97140 MANUAL THERAPY 1/> REGIONS: CPT

## 2020-06-15 ENCOUNTER — HOSPITAL ENCOUNTER (OUTPATIENT)
Dept: PHYSICAL THERAPY | Age: 40
Setting detail: THERAPIES SERIES
Discharge: HOME | End: 2020-06-15
Payer: COMMERCIAL

## 2020-06-15 PROCEDURE — 97140 MANUAL THERAPY 1/> REGIONS: CPT

## 2020-06-15 PROCEDURE — G0283 ELEC STIM OTHER THAN WOUND: HCPCS

## 2020-06-18 ENCOUNTER — HOSPITAL ENCOUNTER (OUTPATIENT)
Dept: PHYSICAL THERAPY | Age: 40
Discharge: HOME | End: 2020-06-18

## 2020-06-22 ENCOUNTER — HOSPITAL ENCOUNTER (OUTPATIENT)
Dept: PHYSICAL THERAPY | Age: 40
Setting detail: THERAPIES SERIES
Discharge: HOME | End: 2020-06-22
Payer: COMMERCIAL

## 2020-06-22 PROCEDURE — 97110 THERAPEUTIC EXERCISES: CPT

## 2020-06-22 PROCEDURE — 97140 MANUAL THERAPY 1/> REGIONS: CPT

## 2020-06-22 NOTE — FLOWSHEET NOTE
Outpatient Physical Therapy  [place]           [x] Phone: [phone]   Fax: [phone]  [facility]           [] Phone: [phone]   Fax: [phone]        Physical Therapy Daily Treatment Note  Date:  2020    Patient Name:  Mukesh Tracy    :  1980  MRN: [id number]  Restrictions/Precautions:    Diagnosis:    M50.30 cervical DDD  Date of Injury/Surgery:   Treatment Diagnosis:    Neck /arm pain  Insurance/Certification information:   [facility]  Referring Physician:     Next Doctor Visit:    Plan of care signed (Y/N):    Outcome Measure: NDI): 29 = severe disability   Visit# / total visits:  4/10 then PN  Pain level: 6/10   Goals:       Short term goals to be achieved by :  Short term goal 1: Pt will report compliance with current HEP as prescribed in order to improve ROM and decrease pain. Short term goal 2: Pt will report headaches no more than 3x/week in order to improve quality of life. Short term goal 3: Pt will demonstrate cervical strength greater than or equal to 4/5 in order to improve strength. Short term goal 4: Pt will demonstrate R  strength greater than or equal to L in order to improve strength. Short term goal 5: Pt will demonstrate a score indicating moderate disability on the NDI in order to improve quality of life.      Long term goals to be achieved by 2020:   Long term goal 1: Pt will demonstrate I with current HEP as prescribed in order to improve strength. Long term goal 2: Pt will demonstrate R UE strength greater than or equal to 4+/5 in order to improve strength. Long term goal 3: Pt will demonstrate a score indicating mild  disability on the NDI in order to improve quality of life. Summary of Evaluation:  refer to evaluation    Subjective: Patient reports he has been feeling better. He reports his worst pain in the last week was 7/10. Pt reports he has stopped using ice and he has just been using heat.  Pt reports he is

## 2020-06-25 ENCOUNTER — HOSPITAL ENCOUNTER (OUTPATIENT)
Dept: PHYSICAL THERAPY | Age: 40
Setting detail: THERAPIES SERIES
Discharge: HOME | End: 2020-06-25
Payer: COMMERCIAL

## 2020-06-25 PROCEDURE — G0283 ELEC STIM OTHER THAN WOUND: HCPCS

## 2020-06-25 PROCEDURE — 97140 MANUAL THERAPY 1/> REGIONS: CPT

## 2020-06-25 NOTE — FLOWSHEET NOTE
would continue to benefit from skilled therapy interventions to address remaining impairments, improve mobility and strength, finalize HEP,  and progress toward goal completion and prepare for d/c ; end session pain rating  4/10      Plan for Next Session:  Continue with manual therapy, e-stim for pain, and increased exercises to add prone press ups    Time In / Time Out: 10:45 - 11:30 am    Timed Code/Total Treatment Minutes:  45 mintues, 2 manual, 1 e-stim    Next Progress Note due:  7/21/2020      Plan of Care Interventions:  [x] Therapeutic Exercise  [] Modalities:  [x] Therapeutic Activity     [x] Ultrasound  [x] Estim  [] Gait Training      [x] Cervical Traction [] Lumbar Traction  [x] Neuromuscular Re-education    [x] Cold/hotpack [] Iontophoresis   [x] Instruction in HEP      [] Vasopneumatic   [] Dry Needling    [x] Manual Therapy               [x] Aquatic Therapy              Electronically signed by:  John Betts DPT [id number]  6/25/2020, 10:44 AM

## 2020-06-29 ENCOUNTER — HOSPITAL ENCOUNTER (OUTPATIENT)
Dept: PHYSICAL THERAPY | Age: 40
Setting detail: THERAPIES SERIES
Discharge: HOME | End: 2020-06-29
Payer: COMMERCIAL

## 2020-06-29 PROCEDURE — 97110 THERAPEUTIC EXERCISES: CPT

## 2020-06-29 PROCEDURE — 97140 MANUAL THERAPY 1/> REGIONS: CPT

## 2020-06-29 PROCEDURE — G0283 ELEC STIM OTHER THAN WOUND: HCPCS

## 2020-07-01 ENCOUNTER — HOSPITAL ENCOUNTER (OUTPATIENT)
Dept: PHYSICAL THERAPY | Age: 40
Setting detail: THERAPIES SERIES
Discharge: HOME | End: 2020-07-01
Payer: COMMERCIAL

## 2020-07-01 PROCEDURE — 97110 THERAPEUTIC EXERCISES: CPT

## 2020-07-01 PROCEDURE — G0283 ELEC STIM OTHER THAN WOUND: HCPCS

## 2020-07-01 PROCEDURE — 97140 MANUAL THERAPY 1/> REGIONS: CPT

## 2020-07-01 NOTE — FLOWSHEET NOTE
Summary Sheet? No    Objective:    Continues to demonstrate increased tightness B UT. Prior to today's treatment session, patient was screened for signs and symptoms related to COVID-19 including but not limited to verbally answering questions related to feeling ill, cough, or SOB, along with taking temperature via forehead thermometer. Patient presented with all negative signs and symptoms and had no fever >100 degrees Fahrenheit this date. Exercises: (No more than 4 columns)   Exercise/Equipment Date: 6/22/2020 Date: 6/25/2020 6/29/2020 7/1/20             WARM UP          UBE     3' FWD/3' [id number]          TABLE       Manual therapy See below See below     AROM Not performed Not performed     Scapular retraction 1x10  1x15 15x    Shoulder elevation/depression 1x10 1x15 15x    Scapular punches 1x10 1x15 15x    Prone press ups Not performed Not performed 10x half range    Prone HABD    Knuckles up/thumbs up x 20 ea       Prone row    Manual resist x 15   seated    thoracic hyperextension over back of chair arms overhead                                                  PROPRIOCEPTION                                          MODALITIES    See below                     Other Therapeutic Activities/Education:  See below      Home Exercise Program:  Nothing new provided. Pt to continue current HEP. Manual Treatments:  Patient prone manual therapy STM/ TPR to UT, MT, and LT as well as intrascapular muscles. Modalities:  Interferrential x15' seated to UT/MT     Communication with other providers: none    Assessment:  (Response towards treatment session) (Pain Rating)  Patient rated his pain 6/10 after treatment.     Plan for Next Session:  Continue with manual therapy, e-stim for pain, and increased exercises to add prone press ups    Time In / Time Out: 1022/1112    Timed Code/Total Treatment Minutes:  48 mintues, 1 manual   20', 1 e-stim 20' with set up, 1 TE  10'Next Progress Note due:  7/21/2020      Plan of Care Interventions:  [x] Therapeutic Exercise  [] Modalities:  [x] Therapeutic Activity     [x] Ultrasound  [x] Estim  [] Gait Training      [x] Cervical Traction [] Lumbar Traction  [x] Neuromuscular Re-education    [x] Cold/hotpack [] Iontophoresis   [x] Instruction in HEP      [] Vasopneumatic   [] Dry Needling    [x] Manual Therapy               [x] Aquatic Therapy              Electronically signed by:  Peg Snyder   7/1/2020, 10:26 AM

## 2020-07-07 ENCOUNTER — HOSPITAL ENCOUNTER (OUTPATIENT)
Dept: PHYSICAL THERAPY | Age: 40
Setting detail: THERAPIES SERIES
Discharge: HOME | End: 2020-07-07
Payer: COMMERCIAL

## 2020-07-07 PROCEDURE — 97140 MANUAL THERAPY 1/> REGIONS: CPT

## 2020-07-07 PROCEDURE — G0283 ELEC STIM OTHER THAN WOUND: HCPCS

## 2020-07-07 NOTE — FLOWSHEET NOTE
Outpatient Physical Therapy  [place]           [x] Phone: [phone]   Fax: [phone]  [facility]           [] Phone: [phone]   Fax: [phone]        Physical Therapy Daily Treatment Note  Date:  2020    Patient Name:  Katharine Jimenez    :  1980  MRN: [id number]  Restrictions/Precautions:    Diagnosis:    M50.30 cervical DDD  Date of Injury/Surgery:   Treatment Diagnosis:    Neck /arm pain  Insurance/Certification information:   [facility]  Referring Physician:     Next Doctor Visit:    Plan of care signed (Y/N):    Outcome Measure: NDI): 29 = severe disability   Visit# / total visits:     Pain level: 6/10   Goals:       Short term goals to be achieved by :  Short term goal 1: Pt will report compliance with current HEP as prescribed in order to improve ROM and decrease pain. Short term goal 2: Pt will report headaches no more than 3x/week in order to improve quality of life. Short term goal 3: Pt will demonstrate cervical strength greater than or equal to 4/5 in order to improve strength. Short term goal 4: Pt will demonstrate R  strength greater than or equal to L in order to improve strength. Short term goal 5: Pt will demonstrate a score indicating moderate disability on the NDI in order to improve quality of life.      Long term goals to be achieved by 2020:   Long term goal 1: Pt will demonstrate I with current HEP as prescribed in order to improve strength. Long term goal 2: Pt will demonstrate R UE strength greater than or equal to 4+/5 in order to improve strength. Long term goal 3: Pt will demonstrate a score indicating mild  disability on the NDI in order to improve quality of life. Summary of Evaluation:  refer to evaluation    Subjective: Patient reports he is still doing his HEP and stretches and he reports he has not had any more HA.  He reports he still has pain, but overall is feeling much better and has much more use of his hands and almost no HA. Any changes in Ambulatory Summary Sheet? No    Objective:    Continues to demonstrate increased tightness B UT. Prior to today's treatment session, patient was screened for signs and symptoms related to COVID-19 including but not limited to verbally answering questions related to feeling ill, cough, or SOB, along with taking temperature via forehead thermometer. Patient presented with all negative signs and symptoms and had no fever >100 degrees Fahrenheit this date. Exercises: (No more than 4 columns)   Exercise/Equipment Date: 6/22/2020 Date: 6/25/2020 6/29/2020 7/1/20  Date: 7/7/2020             WARM UP           UBE     3' FWD/3' [id number] Not performed           TABLE        Manual therapy See below See below   See below   AROM Not performed Not performed   Not performed   Scapular retraction 1x10  1x15 15x  HEP   Shoulder elevation/depression 1x10 1x15 15x  HEP   Scapular punches 1x10 1x15 15x  HEP   Prone press ups Not performed Not performed 10x half range  Not performed   Prone HABD    Knuckles up/thumbs up x 20 ea  Not performed      Prone row    Manual resist x 15 Not performed   seated    thoracic hyperextension over back of chair arms overhead Not performed                                                        PROPRIOCEPTION                                                MODALITIES    See below See below                     Other Therapeutic Activities/Education:  See below      Home Exercise Program:  Pt to continue current HEP. Manual Treatments:  Patient seated manual therapy STM/ TPR to UT, MT, and LT as well as intrascapular muscles. Pt lay supine and PT performed suboccipital release with pt reports of pain at first.  PT performed stretches into shoulder depression each side and then B sides 3x1'.      Modalities:  Interferrential x15' seated to UT/MT     Communication with other providers: none    Assessment:  (Response towards treatment session) (Pain Rating)  Patient rated his pain 6/10 after treatment.     Plan for Next Session:  Continue with manual therapy, e-stim for pain, and increased exercises to add prone press ups    Time In / Time Out: 10:35-11:20 am    Timed Code/Total Treatment Minutes:  45 mintues, 2 manual, 1 e-stim 13' with set upNext Progress Note due:  7/21/2020    Plan of Care Interventions:  [x] Therapeutic Exercise  [] Modalities:  [x] Therapeutic Activity     [x] Ultrasound  [x] Estim  [] Gait Training      [x] Cervical Traction [] Lumbar Traction  [x] Neuromuscular Re-education    [x] Cold/hotpack [] Iontophoresis   [x] Instruction in HEP      [] Vasopneumatic   [] Dry Needling    [x] Manual Therapy               [x] Aquatic Therapy              Electronically signed by:  Chiqui Cooney [id number]   7/7/2020, 10:40 AM

## 2020-07-09 ENCOUNTER — HOSPITAL ENCOUNTER (OUTPATIENT)
Dept: PHYSICAL THERAPY | Age: 40
Setting detail: THERAPIES SERIES
Discharge: HOME | End: 2020-07-09
Payer: COMMERCIAL

## 2020-07-09 PROCEDURE — 97140 MANUAL THERAPY 1/> REGIONS: CPT

## 2020-07-09 PROCEDURE — G0283 ELEC STIM OTHER THAN WOUND: HCPCS

## 2020-07-09 NOTE — FLOWSHEET NOTE
Outpatient Physical Therapy  [place]           [x] Phone: [phone]   Fax: [phone]  [place]           [] Phone: [phone]   Fax: [phone]        Physical Therapy Daily Treatment Note  Date:  2020    Patient Name:  Basil Duron    :  1980  MRN: [id number]  Restrictions/Precautions:    Diagnosis:    M50.30 cervical DDD  Date of Injury/Surgery:   Treatment Diagnosis:    Neck /arm pain  Insurance/Certification information:   [facility]  Referring Physician:     Next Doctor Visit:    Plan of care signed (Y/N):    Outcome Measure: NDI): 29 = severe disability   Visit# / total visits:     Pain level: 6/10   Goals:       Short term goals to be achieved by :  Short term goal 1: Pt will report compliance with current HEP as prescribed in order to improve ROM and decrease pain. Short term goal 2: Pt will report headaches no more than 3x/week in order to improve quality of life. Short term goal 3: Pt will demonstrate cervical strength greater than or equal to 4/5 in order to improve strength. Short term goal 4: Pt will demonstrate R  strength greater than or equal to L in order to improve strength. Short term goal 5: Pt will demonstrate a score indicating moderate disability on the NDI in order to improve quality of life.      Long term goals to be achieved by 2020:   Long term goal 1: Pt will demonstrate I with current HEP as prescribed in order to improve strength. Long term goal 2: Pt will demonstrate R UE strength greater than or equal to 4+/5 in order to improve strength. Long term goal 3: Pt will demonstrate a score indicating mild  disability on the NDI in order to improve quality of life. Summary of Evaluation:  refer to evaluation    Subjective: Continues to rate his pain 6/10 today. No HA since earlier in the week when he was cutting wood. Any changes in Ambulatory Summary Sheet? No    Objective:     Increased tightness noted in L cervical region vs R. Prior to today's treatment session, patient was screened for signs and symptoms related to COVID-19 including but not limited to verbally answering questions related to feeling ill, cough, or SOB, along with taking temperature via forehead thermometer. Patient presented with all negative signs and symptoms and had no fever >100 degrees Fahrenheit this date. Exercises: (No more than 4 columns)   Exercise/Equipment Date: 6/22/2020 Date: 6/25/2020 6/29/2020 7/1/20  Date: 7/7/2020             WARM UP           UBE     3' FWD/3' [id number] Not performed           TABLE        Manual therapy See below See below   See below   AROM Not performed Not performed   Not performed   Scapular retraction 1x10  1x15 15x  HEP   Shoulder elevation/depression 1x10 1x15 15x  HEP   Scapular punches 1x10 1x15 15x  HEP   Prone press ups Not performed Not performed 10x half range  Not performed   Prone HABD    Knuckles up/thumbs up x 20 ea  Not performed      Prone row    Manual resist x 15 Not performed   seated    thoracic hyperextension over back of chair arms overhead Not performed                                                        PROPRIOCEPTION                                                MODALITIES    See below See below                     Other Therapeutic Activities/Education:  See below      Home Exercise Program:  Pt to continue current HEP. Manual Treatments:  Patient seated manual therapy STM/ TPR to UT, MT, and LT as well as intrascapular muscles. Pt lay supine and PT performed suboccipital release with pt reports of pain at first.  PT performed stretches into shoulder depression each side and then B sides 3x1'. Modalities:  Interferrential x15' seated to UT/MT     Communication with other providers: none    Assessment:  (Response towards treatment session) (Pain Rating)  Patient rated his pain 6/10 after treatment.     Plan for Next Session:  Continue with manual therapy,

## 2020-07-10 RX ORDER — BUPROPION HYDROCHLORIDE 300 MG/1
TABLET ORAL
Qty: 90 TABLET | Refills: 1 | Status: SHIPPED | OUTPATIENT
Start: 2020-07-10 | End: 2020-10-30 | Stop reason: SDUPTHER

## 2020-07-12 ENCOUNTER — APPOINTMENT (OUTPATIENT)
Dept: CT IMAGING | Age: 40
End: 2020-07-12
Payer: COMMERCIAL

## 2020-07-12 ENCOUNTER — HOSPITAL ENCOUNTER (EMERGENCY)
Age: 40
Discharge: HOME | End: 2020-07-12
Attending: EMERGENCY MEDICINE
Payer: COMMERCIAL

## 2020-07-12 VITALS
WEIGHT: 265 LBS | HEIGHT: 72 IN | BODY MASS INDEX: 35.89 KG/M2 | HEART RATE: 78 BPM | OXYGEN SATURATION: 98 % | RESPIRATION RATE: 14 BRPM | DIASTOLIC BLOOD PRESSURE: 103 MMHG | SYSTOLIC BLOOD PRESSURE: 145 MMHG | TEMPERATURE: 98.6 F

## 2020-07-12 LAB
ALBUMIN SERPL-MCNC: 3.9 GM/DL (ref 3.4–5)
ALP BLD-CCNC: 86 IU/L (ref 40–129)
ALT SERPL-CCNC: 27 U/L (ref 10–40)
ANION GAP SERPL CALCULATED.3IONS-SCNC: 14 MMOL/L (ref 4–16)
AST SERPL-CCNC: 27 IU/L (ref 15–37)
BACTERIA: ABNORMAL /HPF
BASOPHILS ABSOLUTE: 0.1 K/CU MM
BASOPHILS RELATIVE PERCENT: 0.3 % (ref 0–1)
BILIRUB SERPL-MCNC: 0.4 MG/DL (ref 0–1)
BILIRUBIN URINE: ABNORMAL MG/DL
BLOOD, URINE: ABNORMAL
BUN BLDV-MCNC: 15 MG/DL (ref 6–23)
CALCIUM SERPL-MCNC: 9.6 MG/DL (ref 8.3–10.6)
CAST TYPE: NEGATIVE /HPF
CHLORIDE BLD-SCNC: 103 MMOL/L (ref 99–110)
CLARITY: ABNORMAL
CO2: 20 MMOL/L (ref 21–32)
COLOR: YELLOW
CREAT SERPL-MCNC: 1 MG/DL (ref 0.9–1.3)
CRYSTAL TYPE: NEGATIVE /HPF
DIFFERENTIAL TYPE: ABNORMAL
EOSINOPHILS ABSOLUTE: 0.2 K/CU MM
EOSINOPHILS RELATIVE PERCENT: 1 % (ref 0–3)
EPITHELIAL CELLS, UA: ABNORMAL /HPF
GFR AFRICAN AMERICAN: >60 ML/MIN/1.73M2
GFR NON-AFRICAN AMERICAN: >60 ML/MIN/1.73M2
GLUCOSE BLD-MCNC: 87 MG/DL (ref 70–99)
GLUCOSE, URINE: NEGATIVE MG/DL
HCT VFR BLD CALC: 52.4 % (ref 42–52)
HEMOGLOBIN: 17.1 GM/DL (ref 13.5–18)
IMMATURE NEUTROPHIL %: 0.4 % (ref 0–0.43)
KETONES, URINE: NEGATIVE MG/DL
LEUKOCYTE ESTERASE, URINE: ABNORMAL
LYMPHOCYTES ABSOLUTE: 6.3 K/CU MM
LYMPHOCYTES RELATIVE PERCENT: 40.2 % (ref 24–44)
MCH RBC QN AUTO: 28.7 PG (ref 27–31)
MCHC RBC AUTO-ENTMCNC: 32.6 % (ref 32–36)
MCV RBC AUTO: 88.1 FL (ref 78–100)
MONOCYTES ABSOLUTE: 1 K/CU MM
MONOCYTES RELATIVE PERCENT: 6.3 % (ref 0–4)
NITRITE URINE, QUANTITATIVE: NEGATIVE
PDW BLD-RTO: 13.8 % (ref 11.7–14.9)
PH, URINE: 6 (ref 5–8)
PLATELET # BLD: 273 K/CU MM (ref 140–440)
PMV BLD AUTO: 9.3 FL (ref 7.5–11.1)
POTASSIUM SERPL-SCNC: 4.3 MMOL/L (ref 3.5–5.1)
PROTEIN UA: 100 MG/DL
RBC # BLD: 5.95 M/CU MM (ref 4.6–6.2)
RBC URINE: ABNORMAL /HPF (ref 0–3)
SEGMENTED NEUTROPHILS ABSOLUTE COUNT: 8.1 K/CU MM
SEGMENTED NEUTROPHILS RELATIVE PERCENT: 51.8 % (ref 36–66)
SODIUM BLD-SCNC: 137 MMOL/L (ref 135–145)
SPECIFIC GRAVITY UA: 1.03 (ref 1–1.03)
TOTAL IMMATURE NEUTOROPHIL: 0.06 K/CU MM
TOTAL PROTEIN: 7.3 GM/DL (ref 6.4–8.2)
UROBILINOGEN, URINE: 0.2 MG/DL (ref 0.2–1)
WBC # BLD: 15.6 K/CU MM (ref 4–10.5)
WBC UA: ABNORMAL /HPF (ref 0–2)

## 2020-07-12 PROCEDURE — 85025 COMPLETE CBC W/AUTO DIFF WBC: CPT

## 2020-07-12 PROCEDURE — 81001 URINALYSIS AUTO W/SCOPE: CPT

## 2020-07-12 PROCEDURE — 80053 COMPREHEN METABOLIC PANEL: CPT

## 2020-07-12 PROCEDURE — 96375 TX/PRO/DX INJ NEW DRUG ADDON: CPT

## 2020-07-12 PROCEDURE — 74176 CT ABD & PELVIS W/O CONTRAST: CPT

## 2020-07-12 PROCEDURE — 99284 EMERGENCY DEPT VISIT MOD MDM: CPT

## 2020-07-12 PROCEDURE — 87086 URINE CULTURE/COLONY COUNT: CPT

## 2020-07-12 PROCEDURE — 2580000003 HC RX 258: Performed by: EMERGENCY MEDICINE

## 2020-07-12 PROCEDURE — 96374 THER/PROPH/DIAG INJ IV PUSH: CPT

## 2020-07-12 PROCEDURE — 6360000002 HC RX W HCPCS: Performed by: EMERGENCY MEDICINE

## 2020-07-12 RX ORDER — MORPHINE SULFATE 4 MG/ML
4 INJECTION, SOLUTION INTRAMUSCULAR; INTRAVENOUS EVERY 30 MIN PRN
Status: DISCONTINUED | OUTPATIENT
Start: 2020-07-12 | End: 2020-07-12 | Stop reason: HOSPADM

## 2020-07-12 RX ORDER — SODIUM CHLORIDE 9 MG/ML
INJECTION, SOLUTION INTRAVENOUS CONTINUOUS
Status: DISCONTINUED | OUTPATIENT
Start: 2020-07-12 | End: 2020-07-12 | Stop reason: HOSPADM

## 2020-07-12 RX ORDER — ONDANSETRON 4 MG/1
4 TABLET, ORALLY DISINTEGRATING ORAL EVERY 6 HOURS PRN
Qty: 10 TABLET | Refills: 0 | Status: SHIPPED | OUTPATIENT
Start: 2020-07-12

## 2020-07-12 RX ORDER — ONDANSETRON 2 MG/ML
4 INJECTION INTRAMUSCULAR; INTRAVENOUS EVERY 30 MIN PRN
Status: DISCONTINUED | OUTPATIENT
Start: 2020-07-12 | End: 2020-07-12 | Stop reason: HOSPADM

## 2020-07-12 RX ORDER — 0.9 % SODIUM CHLORIDE 0.9 %
1000 INTRAVENOUS SOLUTION INTRAVENOUS ONCE
Status: COMPLETED | OUTPATIENT
Start: 2020-07-12 | End: 2020-07-12

## 2020-07-12 RX ORDER — KETOROLAC TROMETHAMINE 30 MG/ML
30 INJECTION, SOLUTION INTRAMUSCULAR; INTRAVENOUS ONCE
Status: COMPLETED | OUTPATIENT
Start: 2020-07-12 | End: 2020-07-12

## 2020-07-12 RX ORDER — HYDROCODONE BITARTRATE AND ACETAMINOPHEN 5; 325 MG/1; MG/1
1 TABLET ORAL EVERY 6 HOURS PRN
Qty: 10 TABLET | Refills: 0 | Status: SHIPPED | OUTPATIENT
Start: 2020-07-12 | End: 2020-07-15

## 2020-07-12 RX ORDER — KETOROLAC TROMETHAMINE 10 MG/1
10 TABLET, FILM COATED ORAL EVERY 6 HOURS PRN
Qty: 20 TABLET | Refills: 0 | Status: SHIPPED | OUTPATIENT
Start: 2020-07-12 | End: 2020-07-18

## 2020-07-12 RX ADMIN — ONDANSETRON 4 MG: 2 INJECTION INTRAMUSCULAR; INTRAVENOUS at 15:55

## 2020-07-12 RX ADMIN — MORPHINE SULFATE 4 MG: 4 INJECTION, SOLUTION INTRAMUSCULAR; INTRAVENOUS at 15:55

## 2020-07-12 RX ADMIN — SODIUM CHLORIDE 1000 ML: 9 INJECTION, SOLUTION INTRAVENOUS at 16:00

## 2020-07-12 RX ADMIN — KETOROLAC TROMETHAMINE 30 MG: 30 INJECTION, SOLUTION INTRAMUSCULAR; INTRAVENOUS at 15:55

## 2020-07-12 ASSESSMENT — PAIN DESCRIPTION - PAIN TYPE
TYPE: ACUTE PAIN
TYPE: ACUTE PAIN

## 2020-07-12 ASSESSMENT — PAIN DESCRIPTION - FREQUENCY: FREQUENCY: CONTINUOUS

## 2020-07-12 ASSESSMENT — PAIN DESCRIPTION - ORIENTATION
ORIENTATION: LEFT
ORIENTATION: LEFT

## 2020-07-12 ASSESSMENT — PAIN SCALES - GENERAL
PAINLEVEL_OUTOF10: 9
PAINLEVEL_OUTOF10: 5
PAINLEVEL_OUTOF10: 9

## 2020-07-12 ASSESSMENT — PAIN DESCRIPTION - DESCRIPTORS: DESCRIPTORS: SHARP;STABBING

## 2020-07-12 ASSESSMENT — PAIN DESCRIPTION - LOCATION
LOCATION: FLANK;GROIN
LOCATION: FLANK;GROIN

## 2020-07-12 NOTE — ED NOTES
Pt ambulatory to bathroom and urine specimen obtained and sent to lab     Keshawn Leblanc RN  07/12/20 0120

## 2020-07-12 NOTE — ED NOTES
Pt medicated as ordered. To xray per cart with tech for CT.  States is just starting to ease up a little     Leidy Hayes, LAKESHA  07/12/20 9779

## 2020-07-12 NOTE — ED NOTES
Pt returned from CT. Pt states he is pain free at this time.  Pt attempted to urinate again and was unable     Leidy Hayes RN  07/12/20 0288

## 2020-07-12 NOTE — ED PROVIDER NOTES
Emergency Department Encounter  Location: [address]    Patient: Basil Duron  MRN: [id number]  : 1980  Date of evaluation: 2020  ED Provider: Gerard Phillips DO, FACEP    Chief Complaint:    Flank Pain (left flank pain radiating into left lower abdomen and into groin. history of kidney stones and feels the same)    [place]:  Basil Duron is a 41 y.o. male that presents to the emergency department with complaints of left flank pain that started about half an hour prior to arrival.  The patient has a history of kidney stones states this is similar. He states he is having severe pain in his left flank which is now radiated down to his left lower quadrant. He is nauseated but has not vomited. He states is been over a year since his last kidney stone. He sees Dr. Raquel Steward. He states he noticed blood in his urine earlier. ROS - see HPI, below listed is current ROS at time of my eval:  At least 10 systems reviewed and otherwise acutely negative except as in the [address]   General:  No fevers, no chills, no weakness  Eyes:  No recent vison changes, no discharge  ENT:  No sore throat, no nasal congestion, no hearing changes  Cardiovascular:  No chest pain, no palpitations  Respiratory:  No shortness of breath, no cough, no wheezing  Gastrointestinal:   positive for pain and nausea, no vomiting, no diarrhea  Musculoskeletal:  No muscle pain, no joint pain  Skin:  No rash, no pruritis, no easy bruising  Neurologic:  No speech problems, no headache, no extremity numbness, no extremity tingling, no extremity weakness  Psychiatric:  No anxiety  Genitourinary: Positive for hematuria  Endocrine:  No unexpected weight gain, no unexpected weight loss  Extremities:  no edema, no pain    Past Medical History:   Diagnosis Date    Abnormal liver function tests     scheduled for liver bx 10/4/2013    Anxiety     Asthma     Chronic abdominal pain     Depression     FAP (familial adenomatous polyposis)     family hx of this disorder per h&p    Hepatitis C 2006    History of heroin use 7/16/2018    In the ER February 2015 with acute heroin overdose    History of kidney stones 3/27/2018    Hypertension     Kidney stone      Past Surgical History:   Procedure Laterality Date    APPENDECTOMY  age 14    COLONOSCOPY  9/23/13    int hem    ENDOSCOPY, COLON, DIAGNOSTIC  11/07/2014    gastritis,hiatal hernia    KIDNEY STONE SURGERY      LIVER BIOPSY  7 yr ago   Gaviota Kelly LIVER BIOPSY  10/04/2013    TONSILLECTOMY  as a kid     Family History   Problem Relation Age of Onset    COPD Mother     Diabetes Father     Heart Attack Father     Heart Surgery Father     Heart Disease Father         Mi     Social History     Socioeconomic History    Marital status:      Spouse name: Not on file    Number of children: Not on file    Years of education: Not on file    Highest education level: Not on file   Occupational History    Not on file   Social Needs    Financial resource strain: Not on file    Food insecurity     Worry: Not on file     Inability: Not on file    Transportation needs     Medical: Not on file     Non-medical: Not on file   Tobacco Use    Smoking status: Current Every Day Smoker     Packs/day: 0.50     Years: 7.00     Pack years: 3.50     Types: Cigarettes    Smokeless tobacco: Never Used   Substance and Sexual Activity    Alcohol use: Not Currently     Comment: has not drank for over a year    Drug use: No     Types: Cocaine, IV, Other-see comments     Comment: denies any use since Feb 2015    Sexual activity: Yes   Lifestyle    Physical activity     Days per week: Not on file     Minutes per session: Not on file    Stress: Not on file   Relationships    Social connections     Talks on phone: Not on file     Gets together: Not on file     Attends [employer] service: Not on file     Active member of club or organization: Not on file     Attends meetings of clubs or organizations: Not on file     Relationship status: Not on file    Intimate partner violence     Fear of current or ex partner: Not on file     Emotionally abused: Not on file     Physically abused: Not on file     Forced sexual activity: Not on file   Other Topics Concern    Not on file   Social History Narrative    Not on file     Current Facility-Administered Medications   Medication Dose Route Frequency Provider Last Rate Last Dose    0.9 % sodium chloride infusion   Intravenous Continuous Norm Crea, DO        morphine sulfate (PF) injection 4 mg  4 mg Intravenous Q30 Min PRN Norm Crea, DO   4 mg at 07/12/20 1555    ondansetron (ZOFRAN) injection 4 mg  4 mg Intravenous Q30 Min PRN Norm Crea, DO   4 mg at 07/12/20 1555     Current Outpatient Medications   Medication Sig Dispense Refill    HYDROcodone-acetaminophen (NORCO) 5-325 MG per tablet Take 1 tablet by mouth every 6 hours as needed for Pain for up to 3 days. 10 tablet 0    ketorolac (TORADOL) 10 MG tablet Take 1 tablet by mouth every 6 hours as needed for Pain 20 tablet 0    ondansetron (ZOFRAN ODT) 4 MG disintegrating tablet Take 1 tablet by mouth every 6 hours as needed for Nausea or Vomiting 10 tablet 0    buPROPion (WELLBUTRIN XL) 300 MG extended release tablet TAKE 1 TABLET BY MOUTH EVERY DAY IN THE MORNING 90 tablet 1    losartan-hydroCHLOROthiazide (HYZAAR) 50-12.5 MG per tablet TAKE 1 TABLET BY MOUTH EVERY DAY 30 tablet 2    testosterone (ANDROGEL) 25 MG/2.5GM (1%) GEL 1 % gel Apply 5 g topically daily for 30 days.  1 Package 3    guaiFENesin (MUCINEX) 600 MG extended release tablet Take 1 tablet by mouth 2 times daily 60 tablet 1    meloxicam (MOBIC) 7.5 MG tablet Take 1 tablet by mouth daily 30 tablet 3    fluticasone-salmeterol (ADVAIR DISKUS) 250-50 MCG/DOSE AEPB Inhale 1 puff into the lungs every 12 hours 60 each 5    albuterol sulfate HFA (VENTOLIN HFA) 108 (90 Base) MCG/ACT inhaler Inhale 2 puffs into the lungs every 4 hours as needed for Wheezing or Shortness of Breath 1 Inhaler 5    gabapentin (NEURONTIN) 600 MG tablet Take 1 tablet by mouth 3 times daily for 90 days. 270 tablet 0    ipratropium-albuterol (DUONEB) 0.5-2.5 (3) MG/3ML SOLN nebulizer solution Inhale 3 mLs into the lungs every 6 hours as needed for Shortness of Breath 360 mL 0    Nebulizer System All-In-One MISC Nebulizer unit compressor unit for nebulized breathing treatments 1 each 0    Misc. Devices MISC 1 each by Does not apply route once for 1 dose BP cuff/monitor 1 Device 0     Allergies   Allergen Reactions    Ceclor [Cefaclor] Anaphylaxis     trouble breathing\"    Cephalexin Anaphylaxis     \"trouble breathing\"    Penicillins Anaphylaxis     \"can not breathe and swell up\"    Tetracyclines & Related Anaphylaxis     \"trouble breathing\"    Amlodipine Swelling and Rash    Chantix [Varenicline] Other (See Comments)     Hostile towards wife    Lisinopril Other (See Comments)     Throat \"irritated\"    Phenergan [Promethazine Hcl] Other (See Comments)     Mood altering       Nursing Notes Reviewed    Physical Exam:  ED Triage Vitals   Enc Vitals Group      BP 07/12/20 1536 (!) 151/107      Pulse 07/12/20 1536 87      Resp 07/12/20 1536 18      Temp 07/12/20 1536 98.6 °F (37 °C)      Temp Source 07/12/20 1536 Oral      SpO2 07/12/20 1536 96 %      Weight 07/12/20 1534 265 lb (120.2 kg)      Height 07/12/20 1534 6' (1.829 m)      Head Circumference --       Peak Flow --       Pain Score --       Pain Loc --       Pain Edu? --       Excl. in [address]? --      GENERAL APPEARANCE: Awake and alert. Cooperative. No acute distress. He appears uncomfortable. HEAD: Normocephalic. Atraumatic. EYES: EOM's grossly intact. Sclera anicteric. ENT: Tolerates saliva. No trismus. NECK: Supple. Trachea midline. CARDIO: RRR. Radial pulse 2+. LUNGS: Respirations unlabored. CTAB. ABDOMEN: Soft. Non-distended.   Tenderness in the left flank and left lower quadrant with voluntary guarding. Bowel sounds are normal.  EXTREMITIES: No acute deformities. SKIN: Warm and dry. NEUROLOGICAL: No gross facial drooping. Moves all 4 extremities spontaneously. PSYCHIATRIC: Normal mood. Labs:  Results for orders placed or performed during the hospital encounter of 07/12/20   CBC Auto Differential   Result Value Ref Range    WBC 15.6 (H) 4.0 - 10.5 K/CU MM    RBC 5.95 4.6 - 6.2 M/CU MM    Hemoglobin 17.1 13.5 - 18.0 GM/DL    Hematocrit 52.4 (H) 42 - 52 %    MCV 88.1 78 - 100 FL    MCH 28.7 27 - 31 PG    MCHC 32.6 32.0 - 36.0 %    RDW 13.8 11.7 - 14.9 %    Platelets 250 341 - 622 K/CU MM    MPV 9.3 7.5 - 11.1 FL    Differential Type AUTOMATED DIFFERENTIAL     Segs Relative 51.8 36 - 66 %    Lymphocytes % 40.2 24 - 44 %    Monocytes % 6.3 (H) 0 - 4 %    Eosinophils % 1.0 0 - 3 %    Basophils % 0.3 0 - 1 %    Segs Absolute 8.1 K/CU MM    Lymphocytes Absolute 6.3 K/CU MM    Monocytes Absolute 1.0 K/CU MM    Eosinophils Absolute 0.2 K/CU MM    Basophils Absolute 0.1 K/CU MM    Immature Neutrophil % 0.4 0 - 0.43 %    Total Immature Neutrophil 0.06 K/CU MM   Comprehensive Metabolic Panel   Result Value Ref Range    Sodium 137 135 - 145 MMOL/L    Potassium 4.3 3.5 - 5.1 MMOL/L    Chloride 103 99 - 110 mMol/L    CO2 20 (L) 21 - 32 MMOL/L    BUN 15 6 - 23 MG/DL    CREATININE 1.0 0.9 - 1.3 MG/DL    Glucose 87 70 - 99 MG/DL    Calcium 9.6 8.3 - 10.6 MG/DL    Alb 3.9 3.4 - 5.0 GM/DL    Total Protein 7.3 6.4 - 8.2 GM/DL    Total Bilirubin 0.4 0.0 - 1.0 MG/DL    ALT 27 10 - 40 U/L    AST 27 15 - 37 IU/L    Alkaline Phosphatase 86 40 - 129 IU/L    GFR Non-African American >60 >60 mL/min/1.73m2    GFR African American >60 >60 mL/min/1.73m2    Anion Gap 14 4 - 16         Radiographs (if obtained):  [] The following radiograph was interpreted by myself in the absence of a radiologist:  [x] Radiologist's Report reviewed at time of ED visit:  CT ABDOMEN PELVIS WO CONTRAST   Final Result   1.  Punctate bilateral proximal ureteral stones with mild bilateral   hydronephrosis. 2. Punctate bilateral nonobstructing intrarenal calculi. 3. Diffuse hepatic steatosis. ED Course and MDM:  Patient has punctate ureteral stones with bilateral hydronephrosis. His pain is better after having Toradol and morphine here in the ER. He states he feels well enough to go home. He will be started on oral Toradol and Norco and Zofran for pain and nausea. He is instructed to follow-up with his urologist for recheck and to return to the emergency department if his condition worsens. He is discharged in stable condition at this time. Final Impression:  1. Ureterolithiasis      DISPOSITION Decision To Discharge    Patient referred to:  Aniamari Stephens, [facility]  [phone]    Schedule an appointment as soon as possible for a visit in 2 days  For follow up    Discharge medications:  New Prescriptions    HYDROCODONE-ACETAMINOPHEN (NORCO) 5-325 MG PER TABLET    Take 1 tablet by mouth every 6 hours as needed for Pain for up to 3 days.     KETOROLAC (TORADOL) 10 MG TABLET    Take 1 tablet by mouth every 6 hours as needed for Pain    ONDANSETRON (ZOFRAN ODT) 4 MG DISINTEGRATING TABLET    Take 1 tablet by mouth every 6 hours as needed for Nausea or Vomiting     (Please note that portions of this note may have been completed with a voice recognition program. Efforts were made to edit the dictations but occasionally words are mis-transcribed.)    Griselda Bast, DO, [address] [facility]  Board certified in [address] [place] [address], [address] [place] [address]  07/12/20 4716

## 2020-07-12 NOTE — ED NOTES
Pt ambulatory to bathroom and attempted to get urine specimen. Pt unable to obtain urine specimen at this time. Pt states left flank pain began about a half hour prior to coming to the ER. States is now radiating around to his left lower abdomen and groin. History of kidney stones. Pt appears very uncomfortable. Moaning and rolling on bed.       Leopold Flurry, RN  07/12/20 5807

## 2020-07-12 NOTE — ED NOTES
IV dc'd. Discharge instructions and scripts given to pt. Instructed how to take the medications and educated on sedation with the [facility] [address] to follow up with Dr Alyssa Rodriguez. Instructed to call his office in the morning to make appointment. Instructed to follow up with his family dr and to return to ER if any problems or concerns. Pt verbalizes understanding. Pt discharged ambulatory to await wife who is coming to get him.       Robyn Harris RN  07/12/20 1430

## 2020-07-14 ENCOUNTER — HOSPITAL ENCOUNTER (OUTPATIENT)
Dept: PHYSICAL THERAPY | Age: 40
Setting detail: THERAPIES SERIES
Discharge: HOME | End: 2020-07-14
Payer: COMMERCIAL

## 2020-07-14 LAB
CULTURE: NORMAL
Lab: NORMAL
SPECIMEN: NORMAL

## 2020-07-14 PROCEDURE — 97140 MANUAL THERAPY 1/> REGIONS: CPT

## 2020-07-14 PROCEDURE — G0283 ELEC STIM OTHER THAN WOUND: HCPCS

## 2020-07-14 NOTE — FLOWSHEET NOTE
Outpatient Physical Therapy  [place]           [x] Phone: [phone]   Fax: [phone]  [place]           [] Phone: [phone]   Fax: [phone]        Physical Therapy Daily Treatment Note  Date:  2020    Patient Name:  Preethi Rojas    :  1980  MRN: [id number]  Restrictions/Precautions:    Diagnosis:    M50.30 cervical DDD  Date of Injury/Surgery:   Treatment Diagnosis:    Neck /arm pain  Insurance/Certification information:   [facility]  Referring Physician:     Next Doctor Visit:    Plan of care signed (Y/N):    Outcome Measure: NDI): 29 = severe disability   Visit# / total visits:  10/30   Pain level: 6/10   Goals:       Short term goals to be achieved by :  Short term goal 1: Pt will report compliance with current HEP as prescribed in order to improve ROM and decrease pain. Short term goal 2: Pt will report headaches no more than 3x/week in order to improve quality of life. Short term goal 3: Pt will demonstrate cervical strength greater than or equal to 4/5 in order to improve strength. Short term goal 4: Pt will demonstrate R  strength greater than or equal to L in order to improve strength. Short term goal 5: Pt will demonstrate a score indicating moderate disability on the NDI in order to improve quality of life.      Long term goals to be achieved by 2020:   Long term goal 1: Pt will demonstrate I with current HEP as prescribed in order to improve strength. Long term goal 2: Pt will demonstrate R UE strength greater than or equal to 4+/5 in order to improve strength. Long term goal 3: Pt will demonstrate a score indicating mild  disability on the NDI in order to improve quality of life. Summary of Evaluation:  refer to evaluation    Subjective:  Patient reports of a constant 6/10 pain upon arrival and voices no new c/o. Any changes in Ambulatory Summary Sheet? No    Objective:     Increased tightness noted in L cervical region vs R. Denies having a headache for over a month now and reports the swelling in his hands has gone down. Prior to today's treatment session, patient was screened for signs and symptoms related to COVID-19 including but not limited to verbally answering questions related to feeling ill, cough, or SOB, along with taking temperature via forehead thermometer. Patient presented with all negative signs and symptoms and had no fever >100 degrees Fahrenheit this date. Exercises: (No more than 4 columns)   Exercise/Equipment 7/1/20  Date: 7/7/2020 7/14/2020            WARM UP         UBE  3' FWD/3' [id number] Not performed          TABLE      Manual therapy  See below    AROM  Not performed    Scapular retraction  HEP    Shoulder elevation/depression  HEP    Scapular punches  HEP    Prone press ups  Not performed    Prone HABD Knuckles up/thumbs up x 20 ea  Not performed       Prone row Manual resist x 15 Not performed    seated thoracic hyperextension over back of chair arms overhead Not performed                                             PROPRIOCEPTION                                    MODALITIES See below See below See below                 Other Therapeutic Activities/Education:  See below      Home Exercise Program:  Pt to continue current HEP. Manual Treatments:  Patient supine manual therapy STM/ TPR to UT, MT, and LT as well as intrascapular muscles. Modalities:  Interferrential x15' seated to UT/MT     Communication with other providers: none    Assessment:  (Response towards treatment session) (Pain Rating)  Patient rated his pain 5/10 after treatment.         Plan for Next Session:  Continue with manual therapy, e-stim for pain, and increased exercises to add prone press ups    Time In / Time Out:  5720-8209    Timed Code/Total Treatment Minutes:  39  15ifc 30man Next Progress Note due:  7/21/2020    Plan of Care Interventions:  [x] Therapeutic Exercise  [] Modalities:  [x] Therapeutic Activity     [x] Ultrasound  [x] Estim  [] Gait Training      [x] Cervical Traction [] Lumbar Traction  [x] Neuromuscular Re-education    [x] Cold/hotpack [] Iontophoresis   [x] Instruction in HEP      [] Vasopneumatic   [] Dry Needling    [x] Manual Therapy               [x] Aquatic Therapy              Electronically signed by:  Brandi Patel    7/14/2020, 10:28 AM

## 2020-07-15 ENCOUNTER — ANESTHESIA EVENT (OUTPATIENT)
Dept: OPERATING ROOM | Age: 40
End: 2020-07-15
Payer: COMMERCIAL

## 2020-07-15 ASSESSMENT — LIFESTYLE VARIABLES: SMOKING_STATUS: 1

## 2020-07-15 NOTE — ANESTHESIA PRE PROCEDURE
Department of Anesthesiology  Preprocedure Note       Name:  Cailin Russ   Age:  41 y.o.  :  1980                                          MRN:  [id number]         Date:  7/15/2020      Surgeon: Janelle Rodriguez):  Milad Reese MD    Procedure: Procedure(s):  RIGHT CYSTOSCOPY RETROGRADE PYLEOGRAMS STONE MANIPULATION  BILATERAL CYSTOSCOPY RETROGRADE PYELOGRAM STENT INSERTION    Medications prior to admission:   Prior to Admission medications    Medication Sig Start Date End Date Taking? Authorizing Provider   HYDROcodone-acetaminophen (NORCO) 5-325 MG per tablet Take 1 tablet by mouth every 6 hours as needed for Pain for up to 3 days. 7/12/20 7/15/20  Heriberto Mosley, DO   ketorolac (TORADOL) 10 MG tablet Take 1 tablet by mouth every 6 hours as needed for Pain 20   Heriberto Mosley, DO   ondansetron (ZOFRAN ODT) 4 MG disintegrating tablet Take 1 tablet by mouth every 6 hours as needed for Nausea or Vomiting 20   Heriberto Mosley, DO   buPROPion (WELLBUTRIN XL) 300 MG extended release tablet TAKE 1 TABLET BY MOUTH EVERY DAY IN THE MORNING 7/10/20   Anand Reynolds APRN - CNP   losartan-hydroCHLOROthiazide (HYZAAR) 50-12.5 MG per tablet TAKE 1 TABLET BY MOUTH EVERY DAY 20   Anand Reynolds, APRN - CNP   guaiFENesin [facility]) 600 MG extended release tablet Take 1 tablet by mouth 2 times daily 20  Anand Reynolds, APRN - CNP   meloxicam (MOBIC) 7.5 MG tablet Take 1 tablet by mouth daily 20   Anand Villareale, APRN - CNP   fluticasone-salmeterol (ADVAIR DISKUS) 250-50 MCG/DOSE AEPB Inhale 1 puff into the lungs every 12 hours 3/31/20   Anand Reynolds, APRN - CNP   albuterol sulfate HFA (VENTOLIN HFA) 108 (90 Base) MCG/ACT inhaler Inhale 2 puffs into the lungs every 4 hours as needed for Wheezing or Shortness of Breath 3/31/20 6/9/20  Anand Villareale, APRN - CNP   gabapentin (NEURONTIN) 600 MG tablet Take 1 tablet by mouth 3 times daily for 90 days.  3/30/20 6/28/20  Anand Reynolds, APRN - CNP ipratropium-albuterol (DUONEB) 0.5-2.5 (3) MG/3ML SOLN nebulizer solution Inhale 3 mLs into the lungs every 6 hours as needed for Shortness of Breath 3/15/20   Aline Hazard, DO   Nebulizer System All-In-One MISC Nebulizer unit compressor unit for nebulized breathing treatments 3/15/20   Aline Hazard, DO   Misc. Devices MISC 1 each by Does not apply route once for 1 dose BP cuff/monitor 1/22/20 6/9/20  MARAH Cohen CNP       Current medications:    No current facility-administered medications for this encounter. Current Outpatient Medications   Medication Sig Dispense Refill    HYDROcodone-acetaminophen (NORCO) 5-325 MG per tablet Take 1 tablet by mouth every 6 hours as needed for Pain for up to 3 days. 10 tablet 0    ketorolac (TORADOL) 10 MG tablet Take 1 tablet by mouth every 6 hours as needed for Pain 20 tablet 0    ondansetron (ZOFRAN ODT) 4 MG disintegrating tablet Take 1 tablet by mouth every 6 hours as needed for Nausea or Vomiting 10 tablet 0    buPROPion (WELLBUTRIN XL) 300 MG extended release tablet TAKE 1 TABLET BY MOUTH EVERY DAY IN THE MORNING 90 tablet 1    losartan-hydroCHLOROthiazide (HYZAAR) 50-12.5 MG per tablet TAKE 1 TABLET BY MOUTH EVERY DAY 30 tablet 2    guaiFENesin (MUCINEX) 600 MG extended release tablet Take 1 tablet by mouth 2 times daily 60 tablet 1    meloxicam (MOBIC) 7.5 MG tablet Take 1 tablet by mouth daily 30 tablet 3    fluticasone-salmeterol (ADVAIR DISKUS) 250-50 MCG/DOSE AEPB Inhale 1 puff into the lungs every 12 hours 60 each 5    albuterol sulfate HFA (VENTOLIN HFA) 108 (90 Base) MCG/ACT inhaler Inhale 2 puffs into the lungs every 4 hours as needed for Wheezing or Shortness of Breath 1 Inhaler 5    gabapentin (NEURONTIN) 600 MG tablet Take 1 tablet by mouth 3 times daily for 90 days.  270 tablet 0    ipratropium-albuterol (DUONEB) 0.5-2.5 (3) MG/3ML SOLN nebulizer solution Inhale 3 mLs into the lungs every 6 hours as needed for Shortness of Breath abdominal pain     COPD (chronic obstructive pulmonary disease) (HCC)     Depression     FAP (familial adenomatous polyposis)     family hx of this disorder per h&p    Hepatitis C 2006    \"took medication for this and no longer shows up in my blood\"    History of heroin use 07/16/2018    In the ER February 2015 with acute heroin overdose    History of kidney stones 03/27/2018    \"have stone now\"7/15/2020 follow with Dr Bree Peterson Hypertension     Sleep apnea     sleep study 2017- had cpap    Wears glasses     to read       Past Surgical History:        Procedure Laterality Date    APPENDECTOMY  age 14    COLONOSCOPY  9/23/13    int hem    ENDOSCOPY, COLON, DIAGNOSTIC  11/07/2014    gastritis,hiatal hernia    KIDNEY STONE SURGERY      LIVER BIOPSY  7 yr ago    LIVER BIOPSY  10/04/2013    TONSILLECTOMY  as a kid       Social History:    Social History     Tobacco Use    Smoking status: Current Every Day Smoker     Packs/day: 1.00     Years: 18.00     Pack years: 18.00     Types: Cigarettes    Smokeless tobacco: Never Used   Substance Use Topics    Alcohol use: Not Currently     Comment: has not drank for over a year/ per pt on 7/15/2020\"drink one time per month-\"                                Ready to quit: Not Answered  Counseling given: Not Answered      Vital Signs (Current):   Vitals:    07/15/20 1210   Weight: 265 lb (120.2 kg)   Height: 6' (1.829 m)                                              BP Readings from Last 3 Encounters:   07/12/20 (!) 145/103   05/22/20 132/88   05/05/20 (!) 144/82       NPO Status:                                                                                 BMI:   Wt Readings from Last 3 Encounters:   07/15/20 265 lb (120.2 kg)   07/12/20 265 lb (120.2 kg)   05/22/20 261 lb (118.4 kg)     Body mass index is 35.94 kg/m².     CBC:   Lab Results   Component Value Date    WBC 15.6 07/12/2020    RBC 5.95 07/12/2020    HGB 17.1 07/12/2020    HCT 52.4 07/12/2020    MCV intended. Anesthetic plan and risks discussed with patient. Plan discussed with CRNA. Attending anesthesiologist reviewed and agrees with Pre Eval content     Pre Anesthesia Assessment complete.  Chart reviewed on 7/15/2020        MARAH Mosley - CRNA   7/15/2020

## 2020-07-16 ENCOUNTER — APPOINTMENT (OUTPATIENT)
Dept: GENERAL RADIOLOGY | Age: 40
End: 2020-07-16
Attending: UROLOGY
Payer: COMMERCIAL

## 2020-07-16 ENCOUNTER — HOSPITAL ENCOUNTER (OUTPATIENT)
Age: 40
Setting detail: OUTPATIENT SURGERY
Discharge: HOME | End: 2020-07-16
Attending: UROLOGY | Admitting: UROLOGY
Payer: COMMERCIAL

## 2020-07-16 ENCOUNTER — ANESTHESIA (OUTPATIENT)
Dept: OPERATING ROOM | Age: 40
End: 2020-07-16
Payer: COMMERCIAL

## 2020-07-16 VITALS
OXYGEN SATURATION: 95 % | DIASTOLIC BLOOD PRESSURE: 53 MMHG | RESPIRATION RATE: 3 BRPM | SYSTOLIC BLOOD PRESSURE: 91 MMHG | TEMPERATURE: 98.6 F

## 2020-07-16 VITALS
SYSTOLIC BLOOD PRESSURE: 156 MMHG | TEMPERATURE: 97 F | RESPIRATION RATE: 16 BRPM | WEIGHT: 265 LBS | DIASTOLIC BLOOD PRESSURE: 89 MMHG | BODY MASS INDEX: 35.89 KG/M2 | HEART RATE: 66 BPM | OXYGEN SATURATION: 97 % | HEIGHT: 72 IN

## 2020-07-16 PROCEDURE — 3700000000 HC ANESTHESIA ATTENDED CARE: Performed by: UROLOGY

## 2020-07-16 PROCEDURE — 76000 FLUOROSCOPY <1 HR PHYS/QHP: CPT

## 2020-07-16 PROCEDURE — 7100000001 HC PACU RECOVERY - ADDTL 15 MIN: Performed by: UROLOGY

## 2020-07-16 PROCEDURE — 2720000010 HC SURG SUPPLY STERILE: Performed by: UROLOGY

## 2020-07-16 PROCEDURE — C1894 INTRO/SHEATH, NON-LASER: HCPCS | Performed by: UROLOGY

## 2020-07-16 PROCEDURE — C1758 CATHETER, URETERAL: HCPCS | Performed by: UROLOGY

## 2020-07-16 PROCEDURE — 88300 SURGICAL PATH GROSS: CPT

## 2020-07-16 PROCEDURE — 2580000003 HC RX 258: Performed by: ANESTHESIOLOGY

## 2020-07-16 PROCEDURE — 2709999900 HC NON-CHARGEABLE SUPPLY: Performed by: UROLOGY

## 2020-07-16 PROCEDURE — 2580000003 HC RX 258: Performed by: NURSE ANESTHETIST, CERTIFIED REGISTERED

## 2020-07-16 PROCEDURE — 6360000002 HC RX W HCPCS: Performed by: NURSE ANESTHETIST, CERTIFIED REGISTERED

## 2020-07-16 PROCEDURE — 7100000000 HC PACU RECOVERY - FIRST 15 MIN: Performed by: UROLOGY

## 2020-07-16 PROCEDURE — C2617 STENT, NON-COR, TEM W/O DEL: HCPCS | Performed by: UROLOGY

## 2020-07-16 PROCEDURE — 7100000011 HC PHASE II RECOVERY - ADDTL 15 MIN: Performed by: UROLOGY

## 2020-07-16 PROCEDURE — 3700000001 HC ADD 15 MINUTES (ANESTHESIA): Performed by: UROLOGY

## 2020-07-16 PROCEDURE — 3600000013 HC SURGERY LEVEL 3 ADDTL 15MIN: Performed by: UROLOGY

## 2020-07-16 PROCEDURE — C1769 GUIDE WIRE: HCPCS | Performed by: UROLOGY

## 2020-07-16 PROCEDURE — 3600000003 HC SURGERY LEVEL 3 BASE: Performed by: UROLOGY

## 2020-07-16 PROCEDURE — 6360000004 HC RX CONTRAST MEDICATION: Performed by: UROLOGY

## 2020-07-16 PROCEDURE — 6360000002 HC RX W HCPCS: Performed by: UROLOGY

## 2020-07-16 PROCEDURE — 7100000010 HC PHASE II RECOVERY - FIRST 15 MIN: Performed by: UROLOGY

## 2020-07-16 PROCEDURE — 6370000000 HC RX 637 (ALT 250 FOR IP): Performed by: ANESTHESIOLOGY

## 2020-07-16 DEVICE — VARIABLE LENGTH URETERAL STENT
Type: IMPLANTABLE DEVICE | Site: URETER | Status: FUNCTIONAL
Brand: CONTOUR VL™

## 2020-07-16 RX ORDER — SODIUM CHLORIDE, SODIUM LACTATE, POTASSIUM CHLORIDE, CALCIUM CHLORIDE 600; 310; 30; 20 MG/100ML; MG/100ML; MG/100ML; MG/100ML
INJECTION, SOLUTION INTRAVENOUS CONTINUOUS PRN
Status: DISCONTINUED | OUTPATIENT
Start: 2020-07-16 | End: 2020-07-16 | Stop reason: SDUPTHER

## 2020-07-16 RX ORDER — HYDROCODONE BITARTRATE AND ACETAMINOPHEN 5; 325 MG/1; MG/1
1 TABLET ORAL ONCE
Status: DISCONTINUED | OUTPATIENT
Start: 2020-07-16 | End: 2020-07-16

## 2020-07-16 RX ORDER — HYDRALAZINE HYDROCHLORIDE 20 MG/ML
5 INJECTION INTRAMUSCULAR; INTRAVENOUS EVERY 10 MIN PRN
Status: DISCONTINUED | OUTPATIENT
Start: 2020-07-16 | End: 2020-07-16 | Stop reason: HOSPADM

## 2020-07-16 RX ORDER — FENTANYL CITRATE 50 UG/ML
25 INJECTION, SOLUTION INTRAMUSCULAR; INTRAVENOUS EVERY 5 MIN PRN
Status: DISCONTINUED | OUTPATIENT
Start: 2020-07-16 | End: 2020-07-16 | Stop reason: HOSPADM

## 2020-07-16 RX ORDER — SODIUM CHLORIDE, SODIUM LACTATE, POTASSIUM CHLORIDE, CALCIUM CHLORIDE 600; 310; 30; 20 MG/100ML; MG/100ML; MG/100ML; MG/100ML
INJECTION, SOLUTION INTRAVENOUS ONCE
Status: COMPLETED | OUTPATIENT
Start: 2020-07-16 | End: 2020-07-16

## 2020-07-16 RX ORDER — LIDOCAINE HYDROCHLORIDE 20 MG/ML
INJECTION, SOLUTION INTRAVENOUS PRN
Status: DISCONTINUED | OUTPATIENT
Start: 2020-07-16 | End: 2020-07-16 | Stop reason: SDUPTHER

## 2020-07-16 RX ORDER — CIPROFLOXACIN 2 MG/ML
400 INJECTION, SOLUTION INTRAVENOUS ONCE
Status: COMPLETED | OUTPATIENT
Start: 2020-07-16 | End: 2020-07-16

## 2020-07-16 RX ORDER — ONDANSETRON 2 MG/ML
INJECTION INTRAMUSCULAR; INTRAVENOUS PRN
Status: DISCONTINUED | OUTPATIENT
Start: 2020-07-16 | End: 2020-07-16 | Stop reason: SDUPTHER

## 2020-07-16 RX ORDER — ONDANSETRON 2 MG/ML
4 INJECTION INTRAMUSCULAR; INTRAVENOUS
Status: DISCONTINUED | OUTPATIENT
Start: 2020-07-16 | End: 2020-07-16 | Stop reason: HOSPADM

## 2020-07-16 RX ORDER — DEXAMETHASONE SODIUM PHOSPHATE 4 MG/ML
INJECTION, SOLUTION INTRA-ARTICULAR; INTRALESIONAL; INTRAMUSCULAR; INTRAVENOUS; SOFT TISSUE PRN
Status: DISCONTINUED | OUTPATIENT
Start: 2020-07-16 | End: 2020-07-16 | Stop reason: SDUPTHER

## 2020-07-16 RX ORDER — FENTANYL CITRATE 50 UG/ML
INJECTION, SOLUTION INTRAMUSCULAR; INTRAVENOUS PRN
Status: DISCONTINUED | OUTPATIENT
Start: 2020-07-16 | End: 2020-07-16 | Stop reason: SDUPTHER

## 2020-07-16 RX ORDER — MIDAZOLAM HYDROCHLORIDE 1 MG/ML
INJECTION INTRAMUSCULAR; INTRAVENOUS PRN
Status: DISCONTINUED | OUTPATIENT
Start: 2020-07-16 | End: 2020-07-16 | Stop reason: SDUPTHER

## 2020-07-16 RX ORDER — PROPOFOL 10 MG/ML
INJECTION, EMULSION INTRAVENOUS PRN
Status: DISCONTINUED | OUTPATIENT
Start: 2020-07-16 | End: 2020-07-16 | Stop reason: SDUPTHER

## 2020-07-16 RX ORDER — HYDROCODONE BITARTRATE AND ACETAMINOPHEN 5; 325 MG/1; MG/1
1 TABLET ORAL ONCE
Status: COMPLETED | OUTPATIENT
Start: 2020-07-16 | End: 2020-07-16

## 2020-07-16 RX ADMIN — HYDROCODONE BITARTRATE AND ACETAMINOPHEN 1 TABLET: 5; 325 TABLET ORAL at 14:57

## 2020-07-16 RX ADMIN — FENTANYL CITRATE 100 MCG: 50 INJECTION INTRAMUSCULAR; INTRAVENOUS at 12:49

## 2020-07-16 RX ADMIN — SODIUM CHLORIDE, POTASSIUM CHLORIDE, SODIUM LACTATE AND CALCIUM CHLORIDE: 600; 310; 30; 20 INJECTION, SOLUTION INTRAVENOUS at 11:18

## 2020-07-16 RX ADMIN — CIPROFLOXACIN 400 MG: 2 INJECTION, SOLUTION INTRAVENOUS at 12:57

## 2020-07-16 RX ADMIN — FENTANYL CITRATE 50 MCG: 50 INJECTION INTRAMUSCULAR; INTRAVENOUS at 13:49

## 2020-07-16 RX ADMIN — SODIUM CHLORIDE, POTASSIUM CHLORIDE, SODIUM LACTATE AND CALCIUM CHLORIDE: 600; 310; 30; 20 INJECTION, SOLUTION INTRAVENOUS at 12:38

## 2020-07-16 RX ADMIN — FENTANYL CITRATE 50 MCG: 50 INJECTION INTRAMUSCULAR; INTRAVENOUS at 13:33

## 2020-07-16 RX ADMIN — ONDANSETRON 4 MG: 2 INJECTION INTRAMUSCULAR; INTRAVENOUS at 13:36

## 2020-07-16 RX ADMIN — LIDOCAINE HYDROCHLORIDE 100 MG: 20 INJECTION, SOLUTION INTRAVENOUS at 12:49

## 2020-07-16 RX ADMIN — SODIUM CHLORIDE, POTASSIUM CHLORIDE, SODIUM LACTATE AND CALCIUM CHLORIDE: 600; 310; 30; 20 INJECTION, SOLUTION INTRAVENOUS at 13:17

## 2020-07-16 RX ADMIN — MIDAZOLAM 2 MG: 1 INJECTION INTRAMUSCULAR; INTRAVENOUS at 12:37

## 2020-07-16 RX ADMIN — DEXAMETHASONE SODIUM PHOSPHATE 4 MG: 4 INJECTION, SOLUTION INTRAMUSCULAR; INTRAVENOUS at 12:56

## 2020-07-16 RX ADMIN — PROPOFOL 200 MG: 10 INJECTION, EMULSION INTRAVENOUS at 12:49

## 2020-07-16 ASSESSMENT — PULMONARY FUNCTION TESTS
PIF_VALUE: 26
PIF_VALUE: 15
PIF_VALUE: 16
PIF_VALUE: 25
PIF_VALUE: 15
PIF_VALUE: 1
PIF_VALUE: 1
PIF_VALUE: 24
PIF_VALUE: 15
PIF_VALUE: 26
PIF_VALUE: 4
PIF_VALUE: 24
PIF_VALUE: 1
PIF_VALUE: 26
PIF_VALUE: 24
PIF_VALUE: 25
PIF_VALUE: 15
PIF_VALUE: 25
PIF_VALUE: 15
PIF_VALUE: 15
PIF_VALUE: 1
PIF_VALUE: 1
PIF_VALUE: 15
PIF_VALUE: 22
PIF_VALUE: 26
PIF_VALUE: 15
PIF_VALUE: 25
PIF_VALUE: 15
PIF_VALUE: 25
PIF_VALUE: 25
PIF_VALUE: 1
PIF_VALUE: 25
PIF_VALUE: 12
PIF_VALUE: 25
PIF_VALUE: 15
PIF_VALUE: 25
PIF_VALUE: 15
PIF_VALUE: 25
PIF_VALUE: 25
PIF_VALUE: 15
PIF_VALUE: 6
PIF_VALUE: 1
PIF_VALUE: 1
PIF_VALUE: 24
PIF_VALUE: 15
PIF_VALUE: 24
PIF_VALUE: 15
PIF_VALUE: 1
PIF_VALUE: 25
PIF_VALUE: 15
PIF_VALUE: 1
PIF_VALUE: 15
PIF_VALUE: 25
PIF_VALUE: 15
PIF_VALUE: 5
PIF_VALUE: 26
PIF_VALUE: 15
PIF_VALUE: 15
PIF_VALUE: 23

## 2020-07-16 ASSESSMENT — PAIN DESCRIPTION - LOCATION: LOCATION: FLANK

## 2020-07-16 ASSESSMENT — PAIN SCALES - GENERAL
PAINLEVEL_OUTOF10: 7
PAINLEVEL_OUTOF10: 7
PAINLEVEL_OUTOF10: 4

## 2020-07-16 ASSESSMENT — LIFESTYLE VARIABLES: SMOKING_STATUS: 1

## 2020-07-16 ASSESSMENT — PAIN DESCRIPTION - FREQUENCY: FREQUENCY: CONTINUOUS

## 2020-07-16 ASSESSMENT — PAIN DESCRIPTION - PAIN TYPE: TYPE: SURGICAL PAIN

## 2020-07-16 ASSESSMENT — PAIN DESCRIPTION - ORIENTATION: ORIENTATION: LEFT

## 2020-07-16 NOTE — PROGRESS NOTES
1420:  Pt received from PACU s/p cystoscopy, report received from Mary. Pt ambulated to bathroom with out c/o of dizziness, tolerating po.

## 2020-07-16 NOTE — H&P
Department of Urology   pre op H&P  [facility]      Date: 2020   Patient: Carla Kingsley   : 1980   DOA: 2020   MRN: [id number]   ROOM#: OR/NONE       CHIEF COMPLAINT:  L> r flank pain    History Obtained From:  patient, spouse, electronic medical record    HISTORY OF PRESENT ILLNESS:                The patient is a 41 y.o. male with significant past medical history of diabetes who presents for intervention of bilateral punctate renal stones    Past Medical History:        Diagnosis Date    Abnormal liver function tests     scheduled for liver bx 10/4/2013    Anxiety     Asthma     last flare up winter 2019    Chronic abdominal pain     COPD (chronic obstructive pulmonary disease) ([facility] [address]     Depression     FAP (familial adenomatous polyposis)     family hx of this disorder per h&p    Hepatitis C     \"took medication for this and no longer shows up in my blood\"    History of heroin use 2018    In the ER 2015 with acute heroin overdose    History of kidney stones 2018    \"have stone now\"7/15/2020 follow with Dr Merlin Barney Hypertension     Sleep apnea     sleep study - had cpap    Wears glasses     to read     Past Surgical History:        Procedure Laterality Date    APPENDECTOMY  age 14    COLONOSCOPY  11    int hem    ENDOSCOPY, COLON, DIAGNOSTIC  2014    gastritis,hiatal hernia    KIDNEY STONE SURGERY      LIVER BIOPSY  7 yr ago    LIVER BIOPSY  10/04/2013    TONSILLECTOMY  as a kid     Current Medications:   Current Facility-Administered Medications: ciprofloxacin (CIPRO) IVPB 400 mg, 400 mg, Intravenous, Once    Allergies:  Ceclor [cefaclor]; Cephalexin; Penicillins; Tetracyclines & related; Tape [adhesive tape]; Amlodipine; Chantix [varenicline]; Lisinopril; and Phenergan [promethazine hcl]    Social History:   TOBACCO:   reports that he has been smoking cigarettes. He has a 18.00 pack-year smoking history.  He has never used smokeless LABALBU 3.9 07/12/2020    CREATININE 1.0 07/12/2020    CALCIUM 9.6 07/12/2020    GFRAA >60 07/12/2020    LABGLOM >60 07/12/2020     PT/INR:    Lab Results   Component Value Date    PROTIME 12.9 05/08/2017    INR 1.13 05/08/2017           Imaging:    Ct Abdomen Pelvis Wo Contrast    Result Date: 7/12/2020  EXAMINATION: CT OF THE ABDOMEN AND PELVIS WITHOUT CONTRAST 7/12/2020 3:52 pm TECHNIQUE: CT of the abdomen and pelvis was performed without the administration of intravenous contrast. Multiplanar reformatted images are provided for review. Dose modulation, iterative reconstruction, and/or weight based adjustment of the mA/kV was utilized to reduce the radiation dose to as low as reasonably achievable. COMPARISON: CT abdomen and pelvis 08/09/2019. HISTORY: ORDERING SYSTEM PROVIDED HISTORY: flank pain TECHNOLOGIST PROVIDED HISTORY: Reason for exam:->flank pain Reason for Exam: c/o left flank/groin pain onset 30minutes ago, hx kidney stones Acuity: Acute Type of Exam: Initial Additional signs and symptoms: c/o left flank/groin pain onset 30minutes ago, hx kidney stones Relevant Medical/Surgical History: c/o left flank/groin pain onset 30minutes ago, hx kidney stones FINDINGS: Lower Chest: Limited images through the lung bases are unremarkable. Organs: Lack of intravenous contrast limits evaluation of the solid organs, vascular structures, and bowel. Diffuse hepatic steatosis. No focal hepatic abnormality or surface nodularity. The gallbladder is normal in appearance. No biliary ductal dilatation. The pancreas, spleen, and bilateral adrenal glands are unremarkable. Punctate bilateral nonobstructing renal calculi. Punctate stone in the proximal right ureter on axial image 94 with mild right hydronephrosis. Punctate stone in the proximal left ureter on axial image 90 with mild left hydronephrosis. No distal ureteral calculi. GI/Bowel: No evidence of acute appendicitis. The colon is unremarkable.   No bowel obstruction or abnormal wall thickening. Pelvis: The urinary bladder is normal in appearance. The prostate gland is unremarkable. No free fluid. No pelvic or inguinal lymphadenopathy. Peritoneum/Retroperitoneum: The abdominal aorta is normal in appearance. No retroperitoneal or mesenteric lymphadenopathy is identified. No free air or fluid is seen in the abdomen. Bones/Soft Tissues: No acute osseous or soft tissue abnormality. 1. Punctate bilateral proximal ureteral stones with mild bilateral hydronephrosis. 2. Punctate bilateral nonobstructing intrarenal calculi. 3. Diffuse hepatic steatosis. Impression: 40 yo diabetic male with bilateral punctate renal stones      Recommendation: NPO for cystoscopy, bilateral ureteroscopies/stone manipulations. Patient seen and examined, chart reviewed.      Francine Scott MD     Electronically signed at 7/16/2020

## 2020-07-16 NOTE — OP NOTE
[facility]   Operative Note    Date: 2020   Patient: Saintclair Derry   : 1980   DOA: 2020   MRN: [id number]   ROOM#: OR/NONE     Pre-operative Diagnosis: bilateral ureteral stones    Post-operative Diagnosis: same    Procedure: Cystoscopy, left retrograde pyelogram/ureteroscopy/stone manipulation with Holmium laser/stent insertion, right retrograde pyelogram/ureteroscopy/stone manipulation with Holmium laser/stent insertion    Anesthesia: General endotracheal anesthesia    Surgeons/Assistants: Rufus    EBL: Minimal    Complications: none    Specimens: were obtained - right renal stones    Indication for Procedure: Saintclair Derry is a 41 y.o. male with history of ureterolithiasis. Imaging suggested punctate bilateral ureteral calculi. The patient was unable to get pain relief and unable to pass calculus, so Saintclair Derry was brought to the OR today for cystourethroscopy, bilateral retrograde pyelograms/ureteroscopies with holmium laser manipulation of stones, extraction of stone fragments/ureteral stent insertions. Risks and benefits were explained & Saintclair Derry agreed to proceed as planned. Description of procedure: The patient was identified in the holding area, taken to procedure room, and placed in supine position. General anesthesia was administered. Time out was taken to ensure this was the proper operation, for the proper patient, on the proper side; everyone in the room agreed. The patient was then placed in the dorsal lithotomy position, sterilely prepped and then draped in the usual fashion. Rigid cystoscopy ensured. A left retrograde pyelogram with a cone tipped catheter was performed opacifying the collecting system with findings of no hn/hu/filling defects. A sensor wire was then placed into the patient's left renal pelvis. Rigid ureteroscopy ensued. I was able to get to the mid ureter but no further.   I then placed a uretearl access sheath & was able to get to the UPJ - there was a slight stricture that I wasn't able to bypass, so I stopped. I then removed the ureteroscope, and using the previously placed sensor wire I passed up a 4.8 [employer] [place] variable length double J stent with a cystoscope. It was noted to be in good position proximally fluoroscopically and distally cystoscopically. He will need a left ureteroscopy no sooner than 2 weeks to allow passive dilation of the stricture. This is where I believe the stone was stuck prior. I then turned my attention to the right collecting system. A right retrograde pyelogram with a cone tipped catheter was performed opacifying the collecting system with findings of no hn/hu/filling defects. A sensor wire was then placed into the patient's right renal pelvis. Rigid ureteroscopy ensued. Again I was able to the to the mid ureter but no further. I used a sheath & then a flexible ureteroscope - w/in the right renal pelvis I saw no free stones but did see some soledad's plaques that I was able to remove with a basket and submit. I then removed the ureteroscope, and using the previously placed sensor wire I passed up a 4.8 [employer] Elisa variable length double J stent with a cystoscope. It was noted to be in good position proximally fluoroscopically and distally cystoscopically. The patient's bladder was drained. Narendra Belcher tolerated the procedure well & without difficulty and was then transferred to PACU to recover. Narendra Belcher will need a right JJ stent removal and left ureteroscopy no sooner than 2 weeks to allow passive dilation of the UPJ stricture to permit safe left ureteroscopy. Left retrograde pyelogram was as follows:  Using a cone-tip catheter, 6 cc contrast was injected in the left collecting system opacifying it to completion. A filling defect wasn't seen in the left ureter inconsistent with that noted on prior imaging.      Right retrograde pyelogram was as follows:  Using a cone-tip catheter, 6 cccontrast was injected in the right collecting system opacifying it to completion. A filling defect wasn't seen in the ureter inconsistent with that noted on prior imaging.     Thanh Beck MD  Electronically signed at 7/16/2020

## 2020-07-16 NOTE — PROGRESS NOTES
1435 Pt up to restroom with assist. Pt voided without difficulty. Pt voided red/pink tinged urine with a couple tiny blood clots. Pt back to bed. Pt given apple juice and andreas crackers. Wife at bedside. Call light in reach. 1445 In to check on pt. PT c/o right lower abdominal pain. Called Dr. Josr Arredondo report given on pt c/o pain. Order received to give Norco 5/325mg po now. 1 Pt ready to get dressed to go home. Wife at bedside to assist pt. Call light in reach. 0 Dr. Denise Mcmanus in room to see pt. Jesse Tovar Pt discharged home per wheelchair to wife private vehicle.

## 2020-07-16 NOTE — ANESTHESIA PRE PROCEDURE
Department of Anesthesiology  Preprocedure Note       Name:  Elier Aj   Age:  41 y.o.  :  1980                                          MRN:  [id number]         Date:  2020      Surgeon: Carrillo Hogan):  Diana Webster MD    Procedure: Procedure(s):  RIGHT CYSTOSCOPY RETROGRADE PYLEOGRAMS STONE MANIPULATION  BILATERAL CYSTOSCOPY RETROGRADE PYELOGRAM STENT INSERTION    Medications prior to admission:   Prior to Admission medications    Medication Sig Start Date End Date Taking? Authorizing Provider   ketorolac (TORADOL) 10 MG tablet Take 1 tablet by mouth every 6 hours as needed for Pain 20  Yes Tess Wood DO   ondansetron (ZOFRAN ODT) 4 MG disintegrating tablet Take 1 tablet by mouth every 6 hours as needed for Nausea or Vomiting 20  Yes Tess Wood DO   buPROPion (WELLBUTRIN XL) 300 MG extended release tablet TAKE 1 TABLET BY MOUTH EVERY DAY IN THE MORNING 7/10/20  Yes MARAH Mayfield CNP   losartan-hydroCHLOROthiazide (HYZAAR) 50-12.5 MG per tablet TAKE 1 TABLET BY MOUTH EVERY DAY 20  Yes MARAH Mayfield CNP   guaiFENesin (MUCINEX) 600 MG extended release tablet Take 1 tablet by mouth 2 times daily 20 Yes MARAH Ware CNP   fluticasone-salmeterol (ADVAIR DISKUS) 250-50 MCG/DOSE AEPB Inhale 1 puff into the lungs every 12 hours 3/31/20  Yes MARAH Ware CNP   gabapentin (NEURONTIN) 600 MG tablet Take 1 tablet by mouth 3 times daily for 90 days.  3/30/20 7/16/20 Yes MARAH Ware CNP   ipratropium-albuterol (DUONEB) 0.5-2.5 (3) MG/3ML SOLN nebulizer solution Inhale 3 mLs into the lungs every 6 hours as needed for Shortness of Breath 3/15/20  Yes Tess Wood DO   meloxicam (MOBIC) 7.5 MG tablet Take 1 tablet by mouth daily 20   MARAH Mayfield CNP   albuterol sulfate HFA (VENTOLIN HFA) 108 (90 Base) MCG/ACT inhaler Inhale 2 puffs into the lungs every 4 hours as needed for Wheezing or Shortness of Breath 3/31/20 6/9/20  MARAH Swenson CNP   Nebulizer System All-In-One MISC Nebulizer unit compressor unit for nebulized breathing treatments 3/15/20   DO Sabina Lozanoc. Devices MISC 1 each by Does not apply route once for 1 dose BP cuff/monitor 1/22/20 6/9/20  MARAH Swenson CNP       Current medications:    Current Facility-Administered Medications   Medication Dose Route Frequency Provider Last Rate Last Dose    ciprofloxacin (CIPRO) IVPB 400 mg  400 mg Intravenous Once Lila Jim MD           Allergies: Allergies   Allergen Reactions    Ceclor [Cefaclor] Anaphylaxis     trouble breathing\"    Cephalexin Anaphylaxis     \"trouble breathing\"    Penicillins Anaphylaxis     \"can not breathe and swell up\"    Tetracyclines & Related Anaphylaxis     \"trouble breathing\"    Tape [Adhesive Tape]      \"the paper tape makes a big william on me\"    Amlodipine Swelling and Rash    Chantix [Varenicline] Other (See Comments)     Hostile towards wife    Lisinopril Other (See Comments)     Throat \"irritated\"    Phenergan [Promethazine Hcl] Other (See Comments)     Mood altering       Problem List:    Patient Active Problem List   Diagnosis Code    History of hepatitis C Z86.19    HTN (hypertension) I10    Familial polyposis D12.6    Neuropathy of both feet G57.93    Tobacco use disorder F17.200    Abnormal CT scan, chest R93.89    Anxiety F41.9    Moderate episode of recurrent major depressive disorder (HCC) F33.1    Carpal tunnel syndrome, bilateral G56.03    Irritable bowel syndrome K58.9    Cervical radiculopathy M54.12    Sleep apnea G47.30    Chest pain R07.9    Erectile dysfunction N52.9    Calculus of ureter N20.1    Migraine with aura and without status migrainosus, not intractable G43. 109    Chronic left-sided low back pain with left-sided sciatica M54.42, G89.29    Chronic pain of left knee M25.562, G89.29    Encounter for smoking cessation counseling Z71.6    Persistent cough for 3 weeks or longer R05    COPD with acute exacerbation ([facility] [address] J44.1    Low testosterone in male R79.89       Past Medical History:        Diagnosis Date    Abnormal liver function tests     scheduled for liver bx 10/4/2013    Anxiety     Asthma     last flare up winter 2019    Chronic abdominal pain     COPD (chronic obstructive pulmonary disease) ([facility] [address]     Depression     FAP (familial adenomatous polyposis)     family hx of this disorder per h&p    Hepatitis C 2006    \"took medication for this and no longer shows up in my blood\"    History of heroin use 07/16/2018    In the ER February 2015 with acute heroin overdose    History of kidney stones 03/27/2018    \"have stone now\"7/15/2020 follow with Dr Bozena Montero Hypertension     Sleep apnea     sleep study 2017- had cpap    Wears glasses     to read       Past Surgical History:        Procedure Laterality Date    APPENDECTOMY  age 14    COLONOSCOPY  9/23/13    int hem    ENDOSCOPY, COLON, DIAGNOSTIC  11/07/2014    gastritis,hiatal hernia    KIDNEY STONE SURGERY      LIVER BIOPSY  7 yr ago    LIVER BIOPSY  10/04/2013    TONSILLECTOMY  as a kid       Social History:    Social History     Tobacco Use    Smoking status: Current Every Day Smoker     Packs/day: 1.00     Years: 18.00     Pack years: 18.00     Types: Cigarettes    Smokeless tobacco: Never Used   Substance Use Topics    Alcohol use: Not Currently     Comment: has not drank for over a year/ per pt on 7/15/2020\"drink one time per month-\"                                Ready to quit: Not Answered  Counseling given: Not Answered      Vital Signs (Current):   Vitals:    07/15/20 1210 07/16/20 1101   BP:  (!) 151/90   Pulse:  80   Resp:  18   Temp:  36.8 °C (98.3 °F)   TempSrc:  Temporal   SpO2:  97%   Weight: 265 lb (120.2 kg) 265 lb (120.2 kg)   Height: 6' (1.829 m) 6' (1.829 m)                                              BP Readings from Last 3 Encounters:   07/16/20 (!) 151/90   07/12/20 (!) 145/103   05/22/20 132/88       NPO Status: Time of last liquid consumption: 2330                        Time of last solid consumption: 1900                        Date of last liquid consumption: 07/15/20                        Date of last solid food consumption: 07/15/20    BMI:   Wt Readings from Last 3 Encounters:   07/16/20 265 lb (120.2 kg)   07/12/20 265 lb (120.2 kg)   05/22/20 261 lb (118.4 kg)     Body mass index is 35.94 kg/m². CBC:   Lab Results   Component Value Date    WBC 15.6 07/12/2020    RBC 5.95 07/12/2020    HGB 17.1 07/12/2020    HCT 52.4 07/12/2020    MCV 88.1 07/12/2020    RDW 13.8 07/12/2020     07/12/2020       CMP:   Lab Results   Component Value Date     07/12/2020    K 4.3 07/12/2020     07/12/2020    CO2 20 07/12/2020    BUN 15 07/12/2020    CREATININE 1.0 07/12/2020    GFRAA >60 07/12/2020    AGRATIO 1.5 04/29/2020    LABGLOM >60 07/12/2020    GLUCOSE 87 07/12/2020    PROT 7.3 07/12/2020    CALCIUM 9.6 07/12/2020    BILITOT 0.4 07/12/2020    ALKPHOS 86 07/12/2020    AST 27 07/12/2020    ALT 27 07/12/2020       POC Tests: No results for input(s): POCGLU, POCNA, POCK, POCCL, POCBUN, POCHEMO, POCHCT in the last 72 hours.     Coags:   Lab Results   Component Value Date    PROTIME 12.9 05/08/2017    INR 1.13 05/08/2017    APTT 32.5 10/04/2013       HCG (If Applicable): No results found for: PREGTESTUR, PREGSERUM, HCG, HCGQUANT     ABGs: No results found for: PHART, PO2ART, RMM7UCV, ZVG2JOB, BEART, M3HRJYJM     Type & Screen (If Applicable):  No results found for: LABABO, LABRH    Drug/Infectious Status (If Applicable):  No results found for: HIV, HEPCAB    COVID-19 Screening (If Applicable): No results found for: COVID19      Anesthesia Evaluation  Patient summary reviewed no history of anesthetic complications:   Airway: Mallampati: III  TM distance: >3 FB     Mouth opening: > = 3 FB Dental: normal exam         Pulmonary:normal exam    (+) COPD:  sleep apnea:  asthma: current smoker          Patient smoked on day of surgery. Cardiovascular:  Exercise tolerance: poor (<4 METS),   (+) hypertension:,          Beta Blocker:  Not on Beta Blocker         Neuro/Psych:   (+) neuromuscular disease:, headaches: migraine headaches, psychiatric history:depression/anxiety              ROS comment: History of heroin use  In the ER February 2015 with acute heroin overdose      Cervical radiculopathy   Neuropathy of both feet  Chronic left-sided low back pain with left-sided sciatica  GI/Hepatic/Renal:   (+) hepatitis: C, liver disease:, renal disease: kidney stones, morbid obesity          Endo/Other:                     Abdominal:   (+) obese,         Vascular:                                        Anesthesia Plan      general     ASA 3       Induction: intravenous. MIPS: Postoperative opioids intended. Anesthetic plan and risks discussed with patient. Plan discussed with CRNA. Attending anesthesiologist reviewed and agrees with Pre Eval content        Pre Anesthesia Evaluation complete. Anesthesia plan, risks, benefits, alternatives, and personnel discussed with patient and/or legal guardian. Patient and/or legal guardian verbalized an understanding and agreed to proceed. Anesthesia plan discussed with care team members and agreed upon.   MARAH Castle - MARIELOS  7/16/2020      MARAH Castle CRNA   7/16/2020

## 2020-07-18 ENCOUNTER — APPOINTMENT (OUTPATIENT)
Dept: CT IMAGING | Age: 40
End: 2020-07-18
Payer: COMMERCIAL

## 2020-07-18 ENCOUNTER — HOSPITAL ENCOUNTER (EMERGENCY)
Age: 40
Discharge: HOME | End: 2020-07-18
Attending: EMERGENCY MEDICINE
Payer: COMMERCIAL

## 2020-07-18 VITALS
HEART RATE: 65 BPM | WEIGHT: 265 LBS | HEIGHT: 72 IN | OXYGEN SATURATION: 99 % | TEMPERATURE: 97.5 F | BODY MASS INDEX: 35.89 KG/M2 | SYSTOLIC BLOOD PRESSURE: 189 MMHG | RESPIRATION RATE: 18 BRPM | DIASTOLIC BLOOD PRESSURE: 111 MMHG

## 2020-07-18 LAB
ALBUMIN SERPL-MCNC: 3.8 GM/DL (ref 3.4–5)
ALP BLD-CCNC: 80 IU/L (ref 40–129)
ALT SERPL-CCNC: 26 U/L (ref 10–40)
ANION GAP SERPL CALCULATED.3IONS-SCNC: 15 MMOL/L (ref 4–16)
AST SERPL-CCNC: 32 IU/L (ref 15–37)
BACTERIA: ABNORMAL /HPF
BASOPHILS ABSOLUTE: 0.1 K/CU MM
BASOPHILS RELATIVE PERCENT: 0.4 % (ref 0–1)
BILIRUB SERPL-MCNC: 0.2 MG/DL (ref 0–1)
BILIRUBIN URINE: ABNORMAL MG/DL
BLOOD, URINE: ABNORMAL
BUN BLDV-MCNC: 22 MG/DL (ref 6–23)
CALCIUM SERPL-MCNC: 10.1 MG/DL (ref 8.3–10.6)
CAST TYPE: NEGATIVE /HPF
CHLORIDE BLD-SCNC: 104 MMOL/L (ref 99–110)
CLARITY: ABNORMAL
CO2: 20 MMOL/L (ref 21–32)
COLOR: ABNORMAL
CREAT SERPL-MCNC: 1.2 MG/DL (ref 0.9–1.3)
CRYSTAL TYPE: NEGATIVE /HPF
DIFFERENTIAL TYPE: ABNORMAL
EOSINOPHILS ABSOLUTE: 0.1 K/CU MM
EOSINOPHILS RELATIVE PERCENT: 0.5 % (ref 0–3)
EPITHELIAL CELLS, UA: ABNORMAL /HPF
GFR AFRICAN AMERICAN: >60 ML/MIN/1.73M2
GFR NON-AFRICAN AMERICAN: >60 ML/MIN/1.73M2
GLUCOSE BLD-MCNC: 93 MG/DL (ref 70–99)
GLUCOSE, URINE: NEGATIVE MG/DL
HCT VFR BLD CALC: 50.3 % (ref 42–52)
HEMOGLOBIN: 15.8 GM/DL (ref 13.5–18)
IMMATURE NEUTROPHIL %: 0.5 % (ref 0–0.43)
KETONES, URINE: ABNORMAL MG/DL
LEUKOCYTE ESTERASE, URINE: ABNORMAL
LYMPHOCYTES ABSOLUTE: 7.3 K/CU MM
LYMPHOCYTES RELATIVE PERCENT: 41 % (ref 24–44)
MCH RBC QN AUTO: 28.2 PG (ref 27–31)
MCHC RBC AUTO-ENTMCNC: 31.4 % (ref 32–36)
MCV RBC AUTO: 89.8 FL (ref 78–100)
MONOCYTES ABSOLUTE: 1.2 K/CU MM
MONOCYTES RELATIVE PERCENT: 6.5 % (ref 0–4)
NITRITE URINE, QUANTITATIVE: POSITIVE
PDW BLD-RTO: 14.4 % (ref 11.7–14.9)
PH, URINE: 6.5 (ref 5–8)
PLATELET # BLD: 266 K/CU MM (ref 140–440)
PMV BLD AUTO: 9.4 FL (ref 7.5–11.1)
POTASSIUM SERPL-SCNC: 4.4 MMOL/L (ref 3.5–5.1)
PROTEIN UA: >300 MG/DL
RBC # BLD: 5.6 M/CU MM (ref 4.6–6.2)
RBC URINE: ABNORMAL /HPF (ref 0–3)
SEGMENTED NEUTROPHILS ABSOLUTE COUNT: 9.1 K/CU MM
SEGMENTED NEUTROPHILS RELATIVE PERCENT: 51.1 % (ref 36–66)
SODIUM BLD-SCNC: 139 MMOL/L (ref 135–145)
SPECIFIC GRAVITY UA: 1.02 (ref 1–1.03)
TOTAL IMMATURE NEUTOROPHIL: 0.09 K/CU MM
TOTAL PROTEIN: 6.9 GM/DL (ref 6.4–8.2)
UROBILINOGEN, URINE: 2 MG/DL (ref 0.2–1)
WBC # BLD: 17.8 K/CU MM (ref 4–10.5)
WBC UA: ABNORMAL /HPF (ref 0–2)

## 2020-07-18 PROCEDURE — 6370000000 HC RX 637 (ALT 250 FOR IP): Performed by: EMERGENCY MEDICINE

## 2020-07-18 PROCEDURE — 74176 CT ABD & PELVIS W/O CONTRAST: CPT

## 2020-07-18 PROCEDURE — 85025 COMPLETE CBC W/AUTO DIFF WBC: CPT

## 2020-07-18 PROCEDURE — 99284 EMERGENCY DEPT VISIT MOD MDM: CPT

## 2020-07-18 PROCEDURE — 87086 URINE CULTURE/COLONY COUNT: CPT

## 2020-07-18 PROCEDURE — 80053 COMPREHEN METABOLIC PANEL: CPT

## 2020-07-18 PROCEDURE — 81001 URINALYSIS AUTO W/SCOPE: CPT

## 2020-07-18 RX ORDER — CIPROFLOXACIN 500 MG/1
500 TABLET, FILM COATED ORAL ONCE
Status: COMPLETED | OUTPATIENT
Start: 2020-07-18 | End: 2020-07-18

## 2020-07-18 RX ORDER — HYDROCODONE BITARTRATE AND ACETAMINOPHEN 5; 325 MG/1; MG/1
2 TABLET ORAL ONCE
Status: COMPLETED | OUTPATIENT
Start: 2020-07-18 | End: 2020-07-18

## 2020-07-18 RX ORDER — HYDROCODONE BITARTRATE AND ACETAMINOPHEN 5; 325 MG/1; MG/1
1 TABLET ORAL EVERY 4 HOURS PRN
Qty: 15 TABLET | Refills: 0 | Status: SHIPPED | OUTPATIENT
Start: 2020-07-18 | End: 2020-07-23

## 2020-07-18 RX ORDER — CIPROFLOXACIN 500 MG/1
500 TABLET, FILM COATED ORAL 2 TIMES DAILY
Qty: 20 TABLET | Refills: 0 | Status: SHIPPED | OUTPATIENT
Start: 2020-07-18 | End: 2020-07-28

## 2020-07-18 RX ADMIN — CIPROFLOXACIN 500 MG: 500 TABLET, FILM COATED ORAL at 12:22

## 2020-07-18 RX ADMIN — HYDROCODONE BITARTRATE AND ACETAMINOPHEN 2 TABLET: 5; 325 TABLET ORAL at 11:34

## 2020-07-18 ASSESSMENT — PAIN SCALES - GENERAL: PAINLEVEL_OUTOF10: 9

## 2020-07-18 ASSESSMENT — PAIN DESCRIPTION - ORIENTATION: ORIENTATION: LEFT

## 2020-07-18 ASSESSMENT — PAIN DESCRIPTION - PAIN TYPE: TYPE: ACUTE PAIN

## 2020-07-18 ASSESSMENT — PAIN DESCRIPTION - ONSET: ONSET: ON-GOING

## 2020-07-18 ASSESSMENT — PAIN DESCRIPTION - LOCATION: LOCATION: FLANK

## 2020-07-18 ASSESSMENT — PAIN DESCRIPTION - FREQUENCY: FREQUENCY: CONTINUOUS

## 2020-07-18 NOTE — ED NOTES
Patient given AVS, discharge instructions and prescriptions. Verbalizes understanding no further needs identified at this time.      Juanita Valencia RN  07/18/20 [id number]

## 2020-07-18 NOTE — ED PROVIDER NOTES
Triage Chief Complaint:   Flank Pain (Stents placed 2 days ago, pain has not subsided, ran out of pain meds yesterday, no change in amount of blood in urine)      [place]:  Dennis Niño is a 41 y.o. male that presents to the emergency department bilateral flank pain. Had bilateral ureteral stents placed and did have right-sided stone manipulation by Dr. Paige English [facility].  States he is out of his pain medication as of yesterday. States still having blood in his urine. States pain is aching, throbbing, 8 out of 10. States he does have some burning in his urine. No fevers or chills. No nausea or vomiting. Patient has not contacted his doctor to let him know about his pain or that he ran out of pain medication. . Patient was given a prescription that was filled on the  for 4-day supply, 12 pills.      Past Medical History:   Diagnosis Date    Abnormal liver function tests     scheduled for liver bx 10/4/2013    Anxiety     Asthma     last flare up winter 2019    Chronic abdominal pain     COPD (chronic obstructive pulmonary disease) (HCC)     Depression     FAP (familial adenomatous polyposis)     family hx of this disorder per h&p    Hepatitis C     \"took medication for this and no longer shows up in my blood\"    History of heroin use 2018    In the ER 2015 with acute heroin overdose    History of kidney stones 2018    \"have stone now\"7/15/2020 follow with Dr Julienne Apgar Hypertension     Sleep apnea     sleep study - had cpap    Wears glasses     to read     Past Surgical History:   Procedure Laterality Date    APPENDECTOMY  age 14    COLONOSCOPY  11    int hem    CYSTOSCOPY Right 2020    RIGHT CYSTOSCOPY STONE MANIPULATION performed by Thanh Beck MD at [address] [place] [address] / Kirstin Clay / STONE Bilateral 2020    BILATERAL CYSTOSCOPY RETROGRADE PYELOGRAM STENT INSERTION performed by Thanh Beck MD at [place] [address] ENDOSCOPY, COLON, DIAGNOSTIC  11/07/2014    gastritis,hiatal hernia    KIDNEY STONE SURGERY      LIVER BIOPSY  7 yr ago   Gaviota Kelly LIVER BIOPSY  10/04/2013    TONSILLECTOMY  as a kid     Family History   Problem Relation Age of Onset    COPD Mother     Diabetes Father     Heart Attack Father     Heart Surgery Father     Heart Disease Father         Mi, chf     Social History     Socioeconomic History    Marital status:      Spouse name: Not on file    Number of children: Not on file    Years of education: Not on file    Highest education level: Not on file   Occupational History    Not on file   Social Needs    Financial resource strain: Not on file    Food insecurity     Worry: Not on file     Inability: Not on file    Transportation needs     Medical: Not on file     Non-medical: Not on file   Tobacco Use    Smoking status: Current Every Day Smoker     Packs/day: 1.00     Years: 18.00     Pack years: 18.00     Types: Cigarettes    Smokeless tobacco: Never Used   Substance and Sexual Activity    Alcohol use: Not Currently     Comment: has not drank for over a year/ per pt on 7/15/2020\"drink one time per month-\"    Drug use: No     Types: Cocaine, IV, Other-see comments     Comment: denies any use since Feb 2015    Sexual activity: Yes   Lifestyle    Physical activity     Days per week: Not on file     Minutes per session: Not on file    Stress: Not on file   Relationships    Social connections     Talks on phone: Not on file     Gets together: Not on file     Attends [employer] service: Not on file     Active member of club or organization: Not on file     Attends meetings of clubs or organizations: Not on file     Relationship status: Not on file    Intimate partner violence     Fear of current or ex partner: Not on file     Emotionally abused: Not on file     Physically abused: Not on file     Forced sexual activity: Not on file   Other Topics Concern    Not on file   Social History Narrative    Not on file     Current Facility-Administered Medications   Medication Dose Route Frequency Provider Last Rate Last Dose    ciprofloxacin (CIPRO) tablet 500 mg  500 mg Oral Once Vishal Zapien MD         Current Outpatient Medications   Medication Sig Dispense Refill    ciprofloxacin (CIPRO) 500 MG tablet Take 1 tablet by mouth 2 times daily for 10 days 20 tablet 0    HYDROcodone-acetaminophen (NORCO) 5-325 MG per tablet Take 1 tablet by mouth every 4 hours as needed for Pain for up to 5 days. 15 tablet 0    ondansetron (ZOFRAN ODT) 4 MG disintegrating tablet Take 1 tablet by mouth every 6 hours as needed for Nausea or Vomiting 10 tablet 0    buPROPion (WELLBUTRIN XL) 300 MG extended release tablet TAKE 1 TABLET BY MOUTH EVERY DAY IN THE MORNING 90 tablet 1    losartan-hydroCHLOROthiazide (HYZAAR) 50-12.5 MG per tablet TAKE 1 TABLET BY MOUTH EVERY DAY 30 tablet 2    guaiFENesin (MUCINEX) 600 MG extended release tablet Take 1 tablet by mouth 2 times daily 60 tablet 1    fluticasone-salmeterol (ADVAIR DISKUS) 250-50 MCG/DOSE AEPB Inhale 1 puff into the lungs every 12 hours 60 each 5    albuterol sulfate HFA (VENTOLIN HFA) 108 (90 Base) MCG/ACT inhaler Inhale 2 puffs into the lungs every 4 hours as needed for Wheezing or Shortness of Breath 1 Inhaler 5    gabapentin (NEURONTIN) 600 MG tablet Take 1 tablet by mouth 3 times daily for 90 days. 270 tablet 0    ipratropium-albuterol (DUONEB) 0.5-2.5 (3) MG/3ML SOLN nebulizer solution Inhale 3 mLs into the lungs every 6 hours as needed for Shortness of Breath 360 mL 0    Nebulizer System All-In-One MISC Nebulizer unit compressor unit for nebulized breathing treatments 1 each 0    Misc.  Devices MISC 1 each by Does not apply route once for 1 dose BP cuff/monitor 1 Device 0     Allergies   Allergen Reactions    Ceclor [Cefaclor] Anaphylaxis     trouble breathing\"    Cephalexin Anaphylaxis     \"trouble breathing\"    Penicillins Anaphylaxis \"can not breathe and swell up\"    Tetracyclines & Related Anaphylaxis     \"trouble breathing\"    Tape [Adhesive Tape]      \"the paper tape makes a big william on me\"    Amlodipine Swelling and Rash    Chantix [Varenicline] Other (See Comments)     Hostile towards wife    Lisinopril Other (See Comments)     Throat \"irritated\"    Phenergan [Promethazine Hcl] Other (See Comments)     Mood altering     Nursing Notes Reviewed    ROS:  At least 10 systems reviewed and otherwise negative except as in the [address] Physical Exam:  ED Triage Vitals   Enc Vitals Group      BP       Pulse       Resp       Temp       Temp src       SpO2       Weight       Height       Head Circumference       Peak Flow       Pain Score       Pain Loc       Pain Edu? Excl. in [address]? My pulse oximetry interpretation is which is within the normal range    GENERAL APPEARANCE: Awake and alert. Cooperative. No acute distress. HEAD: Normocephalic. Atraumatic. EYES: EOM's grossly intact. Sclera anicteric. ENT: Mucous membranes are moist. Tolerates saliva. No trismus. NECK: Supple. No meningismus. Trachea midline. HEART: RRR. Radial pulses 2+. LUNGS: Respirations unlabored. CTAB  ABDOMEN: Soft. Non-tender. No guarding or rebound. Normal bowel sounds. EXTREMITIES: No acute deformities. SKIN: Warm and dry. NEUROLOGICAL: No gross facial drooping. Moves all 4 extremities spontaneously. PSYCHIATRIC: Normal mood.     I have reviewed and interpreted all of the currently available lab results from this visit (if applicable):  Results for orders placed or performed during the hospital encounter of 07/18/20   CBC with Auto Diff   Result Value Ref Range    WBC 17.8 (H) 4.0 - 10.5 K/CU MM    RBC 5.60 4.6 - 6.2 M/CU MM    Hemoglobin 15.8 13.5 - 18.0 GM/DL    Hematocrit 50.3 42 - 52 %    MCV 89.8 78 - 100 FL    MCH 28.2 27 - 31 PG    MCHC 31.4 (L) 32.0 - 36.0 %    RDW 14.4 11.7 - 14.9 %    Platelets 765 029 - 974 K/CU MM    MPV 9.4 7.5 - 11.1 FL Differential Type AUTOMATED DIFFERENTIAL     Segs Relative 51.1 36 - 66 %    Lymphocytes % 41.0 24 - 44 %    Monocytes % 6.5 (H) 0 - 4 %    Eosinophils % 0.5 0 - 3 %    Basophils % 0.4 0 - 1 %    Segs Absolute 9.1 K/CU MM    Lymphocytes Absolute 7.3 K/CU MM    Monocytes Absolute 1.2 K/CU MM    Eosinophils Absolute 0.1 K/CU MM    Basophils Absolute 0.1 K/CU MM    Immature Neutrophil % 0.5 (H) 0 - 0.43 %    Total Immature Neutrophil 0.09 K/CU MM   Comprehensive Metabolic Panel w/ Reflex to MG   Result Value Ref Range    Sodium 139 135 - 145 MMOL/L    Potassium 4.4 3.5 - 5.1 MMOL/L    Chloride 104 99 - 110 mMol/L    CO2 20 (L) 21 - 32 MMOL/L    BUN 22 6 - 23 MG/DL    CREATININE 1.2 0.9 - 1.3 MG/DL    Glucose 93 70 - 99 MG/DL    Calcium 10.1 8.3 - 10.6 MG/DL    Alb 3.8 3.4 - 5.0 GM/DL    Total Protein 6.9 6.4 - 8.2 GM/DL    Total Bilirubin 0.2 0.0 - 1.0 MG/DL    ALT 26 10 - 40 U/L    AST 32 15 - 37 IU/L    Alkaline Phosphatase 80 40 - 129 IU/L    GFR Non-African American >60 >60 mL/min/1.73m2    GFR African American >60 >60 mL/min/1.73m2    Anion Gap 15 4 - 16   Urinalysis with microscopic   Result Value Ref Range    Color, UA RED YELLOW    Clarity, UA CLOUDY CLEAR    Glucose, Urine NEGATIVE NEGATIVE MG/DL    Bilirubin Urine MODERATE NEGATIVE MG/DL    Ketones, Urine TRACE NEGATIVE MG/DL    Specific Gravity, UA 1.020 1.001 - 1.035    Blood, Urine LARGE NEGATIVE    pH, Urine 6.5 5.0 - 8.0    Protein, UA >300 (HH) NEGATIVE MG/DL    Urobilinogen, Urine 2.0 (H) 0.2 - 1.0 MG/DL    Nitrite Urine, Quantitative POSITIVE NEGATIVE    Leukocyte Esterase, Urine MODERATE NEGATIVE    RBC, UA TOO NUMEROUS TO COUNT 0 - 3 /HPF    WBC, UA 18 TO 25 0 - 2 /HPF    Epithelial Cells, UA 2 TO 3 /HPF    Cast Type NEGATIVE (A) NO CAST FORMS SEEN /HPF    Bacteria, UA MANY NEGATIVE /HPF    Crystal Type NEGATIVE NEGATIVE /HPF        Radiographs:  [] Radiologist's Wet Read Report Reviewed:      CT ABDOMEN PELVIS WO CONTRAST (Final result)   Result time 07/18/20 11:46:47   Final result by Angela Andrew MD (07/18/20 11:46:47)                 Impression:     1.  Interval bilateral double-J ureteral stent placement, in appropriate   positions.  Previously seen faint ureteral stones are not identified within   the ureters or bladder.  Similar appearance of residual mild bilateral   hydronephrosis and faint bilateral renal calculi. 2.  Findings compatible with hepatic steatosis. [] Discussed with Radiologist:     [] The following radiograph was interpreted by myself in the absence of a radiologist:     EKG: (All EKG's are interpreted by myself in the absence of a cardiologist)      MDM:  Vitals show high BP , likely secondary to pain Heart rate in 60s. Afebrile. .  Patient given 2 p.o. Norco. Urinalysis shows positive nitrate, moderate leuks, 18 to 25 RBCs and many bacteria. CBC shows white count of 17.8. Normal hemoglobin of 15.8. No left shift. CMP normal. CT abdomen shows bilateral stents in good position. Previously seen faint ureteral stones are no longer identified. Mild residual bilateral hydronephrosis. Marilee Montoya Spoke with urology, Dr. Tara Hewitt and discussed case. Did agree to starting him on Cipro. Urine culture pending. Will refill short course of pain medication. He is to call urology and follow up next week. Return to ED if worsens. Clinical Impression:  1. Flank pain    2.  Bacteria in urine        Disposition Vitals:  [unfilled], [unfilled], [unfilled], [unfilled]    Disposition referral (if applicable):  Padmini Barnes MD  [address] [place] [address]  [phone]    Schedule an appointment as soon as possible for a visit         Disposition medications (if applicable):  New Prescriptions    CIPROFLOXACIN (CIPRO) 500 MG TABLET    Take 1 tablet by mouth 2 times daily for 10 days    HYDROCODONE-ACETAMINOPHEN (NORCO) 5-325 MG PER TABLET    Take 1 tablet by mouth every 4 hours as needed for Pain for up to 5 days.         (Please note that portions of this note may have been completed with a voice recognition program. Efforts were made to edit the dictations but occasionally words are mis-transcribed.)    MD Chaitanya Nam MD  07/18/20 7706

## 2020-07-20 ENCOUNTER — HOSPITAL ENCOUNTER (OUTPATIENT)
Dept: PHYSICAL THERAPY | Age: 40
Setting detail: THERAPIES SERIES
Discharge: HOME | End: 2020-07-20
Payer: COMMERCIAL

## 2020-07-20 LAB
CULTURE: NORMAL
Lab: NORMAL
SPECIMEN: NORMAL

## 2020-07-20 PROCEDURE — 97140 MANUAL THERAPY 1/> REGIONS: CPT

## 2020-07-20 NOTE — FLOWSHEET NOTE
Outpatient Physical Therapy  [place]           [x] Phone: [phone]   Fax: [phone]  [place]           [] Phone: [phone]   Fax: [phone]        Physical Therapy Daily Treatment Note  Date:  2020    Patient Name:  Charline Murray    :  1980  MRN: [id number]  Restrictions/Precautions:    Diagnosis:    M50.30 cervical DDD  Date of Injury/Surgery:   Treatment Diagnosis:    Neck /arm pain  Insurance/Certification information:   [facility]  Referring Physician:     Next Doctor Visit:    Plan of care signed (Y/N):    Outcome Measure: NDI): 29 = severe disability   Visit# / total visits:     Pain level: 6/10   Goals:       Short term goals to be achieved by :  Short term goal 1: Pt will report compliance with current HEP as prescribed in order to improve ROM and decrease pain. Short term goal 2: Pt will report headaches no more than 3x/week in order to improve quality of life. Short term goal 3: Pt will demonstrate cervical strength greater than or equal to 4/5 in order to improve strength. Short term goal 4: Pt will demonstrate R  strength greater than or equal to L in order to improve strength. Short term goal 5: Pt will demonstrate a score indicating moderate disability on the NDI in order to improve quality of life.      Long term goals to be achieved by 2020:   Long term goal 1: Pt will demonstrate I with current HEP as prescribed in order to improve strength. Long term goal 2: Pt will demonstrate R UE strength greater than or equal to 4+/5 in order to improve strength. Long term goal 3: Pt will demonstrate a score indicating mild  disability on the NDI in order to improve quality of life. Summary of Evaluation:  refer to evaluation    Subjective:  Neck pan rates 10 today. Had to have stents placed due to kidney stones so he is dealing with a lot of pain from that.   Is hoping to have those removed soon as he has passed the

## 2020-07-22 ENCOUNTER — HOSPITAL ENCOUNTER (OUTPATIENT)
Dept: PHYSICAL THERAPY | Age: 40
Setting detail: THERAPIES SERIES
Discharge: HOME | End: 2020-07-22
Payer: COMMERCIAL

## 2020-07-22 PROCEDURE — 97110 THERAPEUTIC EXERCISES: CPT

## 2020-07-22 PROCEDURE — 97140 MANUAL THERAPY 1/> REGIONS: CPT

## 2020-07-22 NOTE — FLOWSHEET NOTE
Outpatient Physical Therapy  [place]           [x] Phone: [phone]   Fax: [phone]  [facility]           [] Phone: [phone]   Fax: [phone]        Physical Therapy Daily Treatment Note  Date:  2020    Patient Name:  Trisha Ornelas    :  1980  MRN: [id number]  Restrictions/Precautions:    Diagnosis:    M50.30 cervical DDD  Date of Injury/Surgery:   Treatment Diagnosis:    Neck /arm pain  Insurance/Certification information:   [facility]  Referring Physician:     Next Doctor Visit:    Plan of care signed (Y/N):    Outcome Measure: NDI): 29 = severe disability   Visit# / total visits:     Pain level: 6/10   Goals:       Short term goals to be achieved by :  Short term goal 1: Pt will report compliance with current HEP as prescribed in order to improve ROM and decrease pain. Short term goal 2: Pt will report headaches no more than 3x/week in order to improve quality of life. Short term goal 3: Pt will demonstrate cervical strength greater than or equal to 4/5 in order to improve strength. Short term goal 4: Pt will demonstrate R  strength greater than or equal to L in order to improve strength. Short term goal 5: Pt will demonstrate a score indicating moderate disability on the NDI in order to improve quality of life.      Long term goals to be achieved by 2020:   Long term goal 1: Pt will demonstrate I with current HEP as prescribed in order to improve strength. Long term goal 2: Pt will demonstrate R UE strength greater than or equal to 4+/5 in order to improve strength. Long term goal 3: Pt will demonstrate a score indicating mild  disability on the NDI in order to improve quality of life. Summary of Evaluation:  refer to evaluation    Subjective:  Patient reports of 6/10 pain in the neck upon arrival and reports the stint is still in and now he has an infection. Any changes in Ambulatory Summary Sheet?   No    Objective:    UT 7/21/2020    Plan of Care Interventions:  [x] Therapeutic Exercise  [] Modalities:  [x] Therapeutic Activity     [x] Ultrasound  [x] Estim  [] Gait Training      [x] Cervical Traction [] Lumbar Traction  [x] Neuromuscular Re-education    [x] Cold/hotpack [] Iontophoresis   [x] Instruction in HEP      [] Vasopneumatic   [] Dry Needling    [x] Manual Therapy               [x] Aquatic Therapy              Electronically signed by:  Beto Allen    7/22/2020, 9:47 AM

## 2020-07-24 ENCOUNTER — OFFICE VISIT (OUTPATIENT)
Dept: FAMILY MEDICINE CLINIC | Age: 40
End: 2020-07-24
Payer: COMMERCIAL

## 2020-07-24 VITALS
BODY MASS INDEX: 34.91 KG/M2 | DIASTOLIC BLOOD PRESSURE: 86 MMHG | SYSTOLIC BLOOD PRESSURE: 138 MMHG | WEIGHT: 257.4 LBS | TEMPERATURE: 98.4 F | OXYGEN SATURATION: 99 % | RESPIRATION RATE: 16 BRPM | HEART RATE: 90 BPM

## 2020-07-24 PROBLEM — Z96.0 S/P URETERAL STENT PLACEMENT: Status: ACTIVE | Noted: 2020-07-24

## 2020-07-24 PROBLEM — R10.9 FLANK PAIN: Status: ACTIVE | Noted: 2020-07-24

## 2020-07-24 PROCEDURE — 99213 OFFICE O/P EST LOW 20 MIN: CPT | Performed by: NURSE PRACTITIONER

## 2020-07-24 PROCEDURE — 4004F PT TOBACCO SCREEN RCVD TLK: CPT | Performed by: NURSE PRACTITIONER

## 2020-07-24 PROCEDURE — G8427 DOCREV CUR MEDS BY ELIG CLIN: HCPCS | Performed by: NURSE PRACTITIONER

## 2020-07-24 PROCEDURE — G8417 CALC BMI ABV UP PARAM F/U: HCPCS | Performed by: NURSE PRACTITIONER

## 2020-07-24 RX ORDER — TAMSULOSIN HYDROCHLORIDE 0.4 MG/1
CAPSULE ORAL
COMMUNITY
Start: 2020-07-15

## 2020-07-24 RX ORDER — MONTELUKAST SODIUM 10 MG/1
TABLET ORAL
COMMUNITY
Start: 2020-06-18 | End: 2020-08-19

## 2020-07-24 RX ORDER — HYDROCODONE BITARTRATE AND ACETAMINOPHEN 5; 325 MG/1; MG/1
1 TABLET ORAL 2 TIMES DAILY PRN
Qty: 10 TABLET | Refills: 0 | Status: SHIPPED | OUTPATIENT
Start: 2020-07-24 | End: 2020-07-29

## 2020-07-24 NOTE — PROGRESS NOTES
Subjective:      Chief Complaint   Patient presents with    Other     Pt here for 2 month follow up. HPI:  Terrence Laboy is a 41 y.o. male who presents today for 2 month follow up. Hypertension  Patient is here for follow-up of elevated blood pressure. He is not exercising and is adherent to a low-salt diet. He is taking medication as prescribed. He does not check his BP at home. Patient denies chest pain, chest pressure/discomfort, claudication, dyspnea, exertional chest pressure/discomfort, irregular heart beat, lower extremity edema, near-syncope, orthopnea, palpitations, paroxysmal nocturnal dyspnea and syncope. Bilateral Ureteral Stones  Patient is s/p  bilateral stone manipulation and stent placement on 07/16/2020 by Dr. Lit Galeana. He reports continued bilateral flank pain and severe dysuria. Pain is an 8 out of 10. He was seen in the ED on 07/18 and started on ciprofloxacin for leukocytes in the urine. His urine culture showed no bacteria growth. He is currently out of pain medication and has been taking Norco to help relieve pain. He denies hematuria, fever or chills. He is scheduled next Wednesday to have stents removed.          Past Medical History:   Diagnosis Date    Abnormal liver function tests     scheduled for liver bx 10/4/2013    Anxiety     Asthma     last flare up winter 2019    Chronic abdominal pain     COPD (chronic obstructive pulmonary disease) (HCC)     Depression     FAP (familial adenomatous polyposis)     family hx of this disorder per h&p    Hepatitis C 2006    \"took medication for this and no longer shows up in my blood\"    History of heroin use 07/16/2018    In the ER February 2015 with acute heroin overdose    History of kidney stones 03/27/2018    \"have stone now\"7/15/2020 follow with Dr Kathie Mckenzie Hypertension     Sleep apnea     sleep study 2017- had cpap    Wears glasses     to read        Social History     Tobacco Use    Smoking status: Current Every Day Smoker     Packs/day: 1.00     Years: 18.00     Pack years: 18.00     Types: Cigarettes    Smokeless tobacco: Never Used   Substance Use Topics    Alcohol use: Not Currently     Comment: has not drank for over a year/ per pt on 7/15/2020\"drink one time per month-\"        Review of Systems   Constitutional: Negative. Negative for chills, diaphoresis and fever. Respiratory: Negative. Cardiovascular: Negative. Genitourinary: Positive for dysuria and flank pain. Negative for decreased urine volume, difficulty urinating, frequency, hematuria and urgency. Neurological: Negative. Objective:      /86 (Site: Left Upper Arm, Position: Sitting, Cuff Size: Medium Adult)   Pulse 90   Temp 98.4 °F (36.9 °C) (Infrared)   Resp 16   Wt 257 lb 6.4 oz (116.8 kg)   SpO2 99%   BMI 34.91 kg/m²      Physical Exam  Vitals signs reviewed. Constitutional:       Appearance: Normal appearance. He is not ill-appearing. HENT:      Mouth/Throat:      Mouth: Mucous membranes are moist.      Pharynx: Oropharynx is clear. Eyes:      Conjunctiva/sclera: Conjunctivae normal.      Pupils: Pupils are equal, round, and reactive to light. Cardiovascular:      Rate and Rhythm: Normal rate and regular rhythm. Heart sounds: Normal heart sounds. Pulmonary:      Effort: Pulmonary effort is normal.      Breath sounds: Normal breath sounds. Abdominal:      General: Bowel sounds are normal.      Palpations: Abdomen is soft. Skin:     General: Skin is warm and dry. Neurological:      Mental Status: He is alert and oriented to person, place, and time. Assessment / Plan:      1. Essential hypertension  Continue current medication. Monitor blood pressures. Goal is 130/80 or less. Bring your blood pressure recordings to your next appointment, or you can send them to us. Bring your home blood pressure machine with you to your next appointment.   Exercise moderately, 30-45 minutes most days of the week. Lose weight if overweight. Increase your daily intake of grains, fresh fruits and vegetables. Reduce dietary sodium; less than 2.4 grams per day. If you smoke stop smoking. Limit alcohol intake. Reduce stress level    2. Flank pain  Follow up with Urology as scheduled  - HYDROcodone-acetaminophen (NORCO) 5-325 MG per tablet; Take 1 tablet by mouth 2 times daily as needed for Pain for up to 5 days. Intended supply: 30 days  Dispense: 10 tablet; Refill: 0    3. S/P ureteral stent placement  A discussion regarding medication was held with the patient. Discussed the dangerous nature of narcotic/benzo medicines, including the risk of respiratory depression, death and addiction. OARRS reviewed,  - HYDROcodone-acetaminophen (NORCO) 5-325 MG per tablet; Take 1 tablet by mouth 2 times daily as needed for Pain for up to 5 days. Intended supply: 30 days  Dispense: 10 tablet;  Refill: 0          MARAH Davey - CNP

## 2020-07-26 ASSESSMENT — ENCOUNTER SYMPTOMS: RESPIRATORY NEGATIVE: 1

## 2020-07-28 ENCOUNTER — HOSPITAL ENCOUNTER (OUTPATIENT)
Dept: PHYSICAL THERAPY | Age: 40
Setting detail: THERAPIES SERIES
Discharge: HOME | End: 2020-07-28
Payer: COMMERCIAL

## 2020-07-28 PROCEDURE — 97140 MANUAL THERAPY 1/> REGIONS: CPT

## 2020-07-28 NOTE — FLOWSHEET NOTE
Outpatient Physical Therapy  [place]           [x] Phone: [phone]   Fax: [phone]  [place]           [] Phone: [phone]   Fax: [phone]        Physical Therapy Daily Treatment Note  Date:  2020    Patient Name:  Terrence Laboy    :  1980  MRN: [id number]  Restrictions/Precautions:    Diagnosis:    M50.30 cervical DDD  Date of Injury/Surgery:   Treatment Diagnosis:    Neck /arm pain  Insurance/Certification information:   [facility]  Referring Physician:     Next Doctor Visit:    Plan of care signed (Y/N):    Outcome Measure: NDI): 29 = severe disability   Visit# / total visits:     Pain level: 6/10   Goals:       Short term goals to be achieved by :  Short term goal 1: Pt will report compliance with current HEP as prescribed in order to improve ROM and decrease pain. Short term goal 2: Pt will report headaches no more than 3x/week in order to improve quality of life. Short term goal 3: Pt will demonstrate cervical strength greater than or equal to 4/5 in order to improve strength. Short term goal 4: Pt will demonstrate R  strength greater than or equal to L in order to improve strength. Short term goal 5: Pt will demonstrate a score indicating moderate disability on the NDI in order to improve quality of life.      Long term goals to be achieved by 2020:   Long term goal 1: Pt will demonstrate I with current HEP as prescribed in order to improve strength. Long term goal 2: Pt will demonstrate R UE strength greater than or equal to 4+/5 in order to improve strength. Long term goal 3: Pt will demonstrate a score indicating mild  disability on the NDI in order to improve quality of life. Summary of Evaluation:  refer to evaluation    Subjective:  Patient rates his neck pain 6/10 this morning. States that he gets about an hour of relief from the therapy and then pain returns to 6/10.   Is scheduled for removal of the stent tomorrow. Any changes in Ambulatory Summary Sheet? No    Objective:    R UT tightness >L  Prior to today's treatment session, patient was screened for signs and symptoms related to COVID-19 including but not limited to verbally answering questions related to feeling ill, cough, or SOB, along with taking temperature via forehead thermometer. Patient presented with all negative signs and symptoms and had no fever >100 degrees Fahrenheit this date. Exercises: (No more than 4 columns)   Exercise/Equipment 7/1/20  Date: 7/7/2020 7/14/2020 7/22/2020            WARM UP          UBE  3' FWD/3' [id number] Not performed  3/3 F/B          TABLE       Manual therapy  See below     AROM  Not performed     Scapular retraction  HEP     Shoulder elevation/depression  HEP     Scapular punches  HEP     Prone press ups  Not performed     Prone HABD Knuckles up/thumbs up x 20 ea  Not performed        Prone row Manual resist x 15 Not performed     seated thoracic hyperextension over back of chair arms overhead Not performed                                                    PROPRIOCEPTION                                          MODALITIES See below See below See below See below                   Other Therapeutic Activities/Education:  See below      Home Exercise Program:  Pt to continue current HEP. Manual Treatments:  Patient seated manual therapy STM/ TPR to UT, MT, and LT as well as intrascapular muscles. Modalities:    Declined    Communication with other providers: none    Assessment:  (Response towards treatment session) (Pain Rating)  Pain rated 5/10 after treatment with no HA.         Plan for Next Session:  Continue with manual therapy, e-stim for pain, and increased exercises to add prone press ups    Time In / Time Out:   0947/1017    Timed Code/Total Treatment Minutes:  30' MTNext Progress Note due:  7/21/2020    Plan of Care Interventions:  [x] Therapeutic Exercise  [] Modalities:  [x] Therapeutic Activity     [x] Ultrasound  [x] Estim  [] Gait Training      [x] Cervical Traction [] Lumbar Traction  [x] Neuromuscular Re-education    [x] Cold/hotpack [] Iontophoresis   [x] Instruction in HEP      [] Vasopneumatic   [] Dry Needling    [x] Manual Therapy               [x] Aquatic Therapy              Electronically signed by:  Omi Nino    7/28/2020, 10:20 AM

## 2020-07-29 ENCOUNTER — HOSPITAL ENCOUNTER (OUTPATIENT)
Age: 40
Setting detail: SPECIMEN
Discharge: HOME | End: 2020-07-29

## 2020-07-29 PROCEDURE — 82360 CALCULUS ASSAY QUANT: CPT

## 2020-07-30 ENCOUNTER — HOSPITAL ENCOUNTER (OUTPATIENT)
Dept: PHYSICAL THERAPY | Age: 40
Setting detail: THERAPIES SERIES
Discharge: HOME | End: 2020-07-30
Payer: COMMERCIAL

## 2020-07-30 PROCEDURE — 97140 MANUAL THERAPY 1/> REGIONS: CPT

## 2020-07-30 PROCEDURE — G0283 ELEC STIM OTHER THAN WOUND: HCPCS

## 2020-07-30 PROCEDURE — 97110 THERAPEUTIC EXERCISES: CPT

## 2020-07-30 NOTE — FLOWSHEET NOTE
tomorrow. Any changes in Ambulatory Summary Sheet? No    Objective:   3030  R UT tightness >L       R  100,92,95 Av.67 lbs   L : 95,95,94 Av.67 lbs    Flexion: 4/5  R side bendin/5       L side bendin/5  R rotation: 4+/5    L rotation:  4+/5  NDI: 17 = moderate disability    Prior to today's treatment session, patient was screened for signs and symptoms related to COVID-19 including but not limited to verbally answering questions related to feeling ill, cough, or SOB, along with taking temperature via forehead thermometer. Patient presented with all negative signs and symptoms and had no fever >100 degrees Fahrenheit this date. Exercises: (No more than 4 columns)   Exercise/Equipment 20  Date: 2020            WARM UP          UBE  3' FWD/3' [id number] Not performed  3/3 F/B          TABLE       Manual therapy  See below  See below   AROM  Not performed  Not performed   Scapular retraction  HEP  1x15    Shoulder elevation/depression  HEP  1x15   Scapular punches  HEP  1x15   Prone press ups  Not performed  Not performed   Prone HABD Knuckles up/thumbs up x 20 ea  Not performed  Not performed      Prone row Manual resist x 15 Not performed  Not performed    seated thoracic hyperextension over back of chair arms overhead Not performed  Scapular protraction 1x15                                                  PROPRIOCEPTION                                          MODALITIES See below See below See below See below                   Other Therapeutic Activities/Education:  See below      Home Exercise Program:  Pt to continue current HEP. Manual Treatments:  Patient seated manual therapy STM/ TPR to UT, MT, and LT as well as intrascapular muscles using elbow to go deeper. PT performed shoulder depression stretch 2x1' in supine.      Modalities:  Interferrential x20' seated to B UT/MT    Communication with other providers: none    Assessment: (Response towards treatment session) (Pain Rating)  Pain rated 5/10 after treatment with no HA. Pt reports he feels much looser especially on R side. Pt reports he continues to have a spot on L side causing problems.      Plan for Next Session:  Continue with manual therapy focusing on L side    Time In / Time Out:   10:35-11:40 am    Timed Code/Total Treatment Minutes:  65 minutes/ 1 therapeutic exercise, 1 e-stim, 2 manualNext Progress Note due:  7/21/2020    Plan of Care Interventions:  [x] Therapeutic Exercise  [] Modalities:  [x] Therapeutic Activity     [x] Ultrasound  [x] Estim  [] Gait Training      [x] Cervical Traction [] Lumbar Traction  [x] Neuromuscular Re-education    [x] Cold/hotpack [] Iontophoresis   [x] Instruction in HEP      [] Vasopneumatic   [] Dry Needling    [x] Manual Therapy               [x] Aquatic Therapy              Electronically signed by:  Sallie Ceron DPT [id number]  7/30/2020, 11:07 AM

## 2020-07-31 ENCOUNTER — OFFICE VISIT (OUTPATIENT)
Dept: PHYSICAL MEDICINE AND REHAB | Age: 40
End: 2020-07-31
Payer: COMMERCIAL

## 2020-07-31 VITALS — TEMPERATURE: 97.2 F

## 2020-07-31 PROCEDURE — 95886 MUSC TEST DONE W/N TEST COMP: CPT | Performed by: PHYSICAL MEDICINE & REHABILITATION

## 2020-07-31 PROCEDURE — 95911 NRV CNDJ TEST 9-10 STUDIES: CPT | Performed by: PHYSICAL MEDICINE & REHABILITATION

## 2020-07-31 NOTE — PROGRESS NOTES
EMG REPORT     CHIEF COMPLAINT: Neck pain with headache. HISTORY OF PRESENT ILLNESS: 41 y.o. right hand dominant male with persistent neck pain and headaches over the last year or so. His symptoms have been minimally impacted by physical therapy. His symptoms seem independent of activity or time of day. He gets posterior neck pain radiating up over the posterior scalp to the top of his head. He gets radiation out into the shoulders. His arms feel numb and \"thick\". He reports tingling in his fingers of each hand. Overall his symptoms are little worse on the left. He rated the pain severity as 6/10. He did not report much impact on his sleep. He has not been dropping things. He has no similar symptoms in his legs. He has no history of diabetes or any thyroid disorder. PHYSICAL EXAMINATION: Alert. About 65 degrees of active cervical spine rotation bilaterally. Spurling's maneuver was negative. Upper limb reflexes were trace and symmetric. Tinel sign is negative. Upper limb strength was normal throughout. Sensation was preserved throughout. Tone and reflexes were normal.  There was no tremor or clonus present. NERVE CONDUCTION STUDIES:     MOTOR         LATENCY NORMAL AMPLITUDE DISTANCE COND. CHETNA. RIGHT  MEDIAN 4.2 < 4.2 msec 4.4 8 cm >50   LEFT  MEDIAN 4.2 < 4.2 msec 4.1 8 cm 57   RIGHT  ULNAR 3.3 < 4.2 msec 7.9 8 cm >50   LEFT  ULNAR 3.0 < 4.2 msec 6.6 8 cm 59      SENSORY  ORTHODROMIC        LATENCY NORMAL AMPLITUDE DISTANCE   RIGHT MEDIAN 2.6 <2.3 msec 22 10 cm   LEFT  MEDIAN 2.6 < 2.3 msec 61 10 cm   RIGHT  ULNAR 1.9 < 2.3 msec 8 10 cm   LEFT  ULNAR 2.4 < 2.3 msec 8 10 cm       Left dorsal ulnar sensory: dL 2.3 msec, amp 9 microV.         NEEDLE EMG:      RIGHT   LEFT     Insertional Activity Spontaneous  Activity Volutional  MUAP's Insertional Activity Spontaneous  Activity Volutional  MUAP's   Cerv Parasp Normal None Normal Normal None Normal   Infraspinatus Normal None Normal Normal None Normal   Deltoid   Normal None Normal Normal None Normal   Biceps   Normal None Normal Normal None Normal   Triceps   Normal None Normal Normal None Normal   Pronator Teres Normal None Normal Normal None Normal   Extensor Indicis Normal None Normal Normal None Normal   1st Dorsal Interosseus Normal None Normal Normal None Normal   Abductor Pollicis Br. Normal None Normal Normal None Normal       FINDINGS:   EMG of the cervical paraspinals and both upper limbs demonstrated no paraspinal nor limb muscle membrane irritability. Motor units were of normal configuration and recruitment throughout. Median sensory latencies were mildly delayed across each wrist with well-preserved SNAP amplitudes. Median motor latencies were at the upper border of normal, but more than a full milliseconds slower than the ipsilateral ulnar value on the left. Ulnar conductions were well maintained. IMPRESSION:      1. Mildly abnormal EMG. There are very mild median neuropathies at each wrist (electrically mild bilateral carpal tunnel syndrome). Perhaps mild carpal tunnel syndrome is disturbing his deep sleep and indirectly triggering cervical myalgias. I do not believe his carpal tunnel syndrome directly explains his complaints of headache. 2.  No evidence of a focal spinal nerve root injury (radiculopathy), plexopathy, generalized neuropathy or primary muscle disease. Clinically, he seems to exhibit several historical and physical exam findings of secondary myofascial pain syndrome possibly associated with cervical spondylosis.        Thank you for this interesting referral.

## 2020-07-31 NOTE — LETTER
July 31, 2020        [place] Brightly, APRN - CNP  [address] [place] [address], [address] [place] [address]        Patient Name: Narendra Belcher   MRN Number: [id number]   YOB: 1980   Date Of Visit: 07/31/2020          Dear Cierra Shrestha CNP,       Thank you for referring Narendra Belcher to me for electrodiagnostic testing. Attached are the findings of the EMG and my assessment. If you have any further questions, please do not hesitate to call me. Thank you for this interesting referral.      Sincerely,          THERESE Cartwright MD.

## 2020-08-03 ENCOUNTER — HOSPITAL ENCOUNTER (OUTPATIENT)
Dept: PHYSICAL THERAPY | Age: 40
Setting detail: THERAPIES SERIES
Discharge: HOME | End: 2020-08-03
Payer: COMMERCIAL

## 2020-08-03 LAB
STONE COMPOSITION: NORMAL
STONE DESCRIPTION: NORMAL
STONE MASS: 3 MG
STONE NUMBER: 1
STONE SIZE: NORMAL MM

## 2020-08-03 NOTE — FLOWSHEET NOTE
Physical Therapy  Cancellation/No-show Note  Patient Name:  Elier Aj  :  1980   Date:  8/3/2020  Cancelled visits to date: 1  No-shows to date: 0    For today's appointment patient:  [x]  Cancelled  []  Rescheduled appointment  []  No-show     Reason given by patient:  []  Patient ill  []  Conflicting appointment  [x]  No transportation    []  Conflict with work  []  No reason given  []  Other:     Comments:      Electronically signed by:  William Roblero PTA

## 2020-08-06 ENCOUNTER — HOSPITAL ENCOUNTER (OUTPATIENT)
Dept: PHYSICAL THERAPY | Age: 40
Setting detail: THERAPIES SERIES
Discharge: HOME | End: 2020-08-06
Payer: COMMERCIAL

## 2020-08-06 PROCEDURE — G0283 ELEC STIM OTHER THAN WOUND: HCPCS

## 2020-08-06 PROCEDURE — 97110 THERAPEUTIC EXERCISES: CPT

## 2020-08-06 PROCEDURE — 97140 MANUAL THERAPY 1/> REGIONS: CPT

## 2020-08-10 NOTE — PROGRESS NOTES
Outpatient Physical Therapy        [x] Phone: [phone]   Fax: [phone]   Physician: Alexander Baires CNP   From: Roger Guan, PT DPT    Patient: Katharine Jimenez                  : 1980  Diagnosis: M50.30 cervical DDD    Date: 8/10/2020  Treatment Diagnosis:  Neck/arm pain    [x]  Progress Note                []  Discharge Note    Total Visits to date: 12    Cancels/No-shows to date:  2    Subjective: Pt reports he gets relief for about a day following PT, but it does not last. He reports he no longer gets HA and his fingers are no longer swelling. Plan of Care/Treatment to date:  [x] Therapeutic Exercise    [x] Modalities:  [] Therapeutic Activity     [] Ultrasound  [x] Electric Stimulation  [] Gait Training      [] Cervical Traction    [] Lumbar Traction  [] Neuromuscular Re-education  [] Cold/hotpack [] Iontophoresis  [x] Instruction in HEP      Other:  [x] Manual Therapy       []  Vasopneumatic  [] Aquatic Therapy       []                    ? Objective/Significant Findings At Last Visit/Comments:    R  100,92,95 Av.67 lbs   L : 95,95,94 Av.67 lbs     Flexion: 4/5  R side bendin/5       L side bendin/5  R rotation: 4+/5    L rotation:  4+/5  NDI: 17 = moderate disability    Assessment: Pt is a pleasant 42 yo male who would benefit from skilled PT to address decreased strength, functional mobility and increased pain.      Goal Status:  [x] Achieved [] Partially Achieved  [] Not Achieved   Short term goal 1: Pt will report compliance with current HEP as prescribed in order to improve ROM and decrease pain. - MET, discharge goal.   Short term goal 2: Pt will report headaches no more than 3x/week in order to improve quality of life. - MET, discharge goal.   Short term goal 3: Pt will demonstrate cervical strength greater than or equal to 4/5 in order to improve strength. - MET, discharge goal.   Short term goal 4: Pt will demonstrate R  strength greater than or equal to L in order to improve strength. - MET, discharge goal.   Short term goal 5: Pt will demonstrate a score indicating moderate disability on the NDI in order to improve quality of life.  - MET discharge goal.     Updated Goals to be achieved by September 17, 2020:  Long term goal 1: Pt will demonstrate I with current HEP as prescribed in order to improve strength.   Long term goal 2: Pt will demonstrate R UE strength greater than or equal to 4+/5 in order to improve strength.   Long term goal 3: Pt will demonstrate a score indicating mild  disability on the NDI in order to improve quality of life. Frequency/Duration:  # Days per week: [x] 1+ day # Weeks: [] 1 week [] 4 weeks      [] 2 days? [] 2 weeks [] 5 weeks      [] 3 days   [] 3 weeks [x] 6 weeks     Rehab Potential: [] Excellent [x] Good [] Fair  [] Poor     Patient Status: [x] Continue per initial plan of Care     [] Patient now discharged     [x] Additional visits requested, Please re-certify for additional visits:      Requested frequency/duration: 1+ /week for 6 weeks    If we are requesting more visits, we fully anticipate the patient's condition is expected to improve within the treatment timeframe we are requesting. Electronically signed by:  Padmini Quinn PT,DPT [id number]  8/10/2020, 7:08 PM    If you have any questions or concerns, please don't hesitate to call.   Thank you for your referral.    Physician Signature:______________________ Date:______ Time: ________  By signing above, therapists plan is approved by physician

## 2020-08-11 ENCOUNTER — OFFICE VISIT (OUTPATIENT)
Dept: ORTHOPEDIC SURGERY | Age: 40
End: 2020-08-11
Payer: COMMERCIAL

## 2020-08-11 ENCOUNTER — HOSPITAL ENCOUNTER (OUTPATIENT)
Dept: PHYSICAL THERAPY | Age: 40
Setting detail: THERAPIES SERIES
Discharge: HOME | End: 2020-08-11
Payer: COMMERCIAL

## 2020-08-11 VITALS — BODY MASS INDEX: 34.74 KG/M2 | RESPIRATION RATE: 16 BRPM | WEIGHT: 256.5 LBS | HEIGHT: 72 IN

## 2020-08-11 PROCEDURE — 97110 THERAPEUTIC EXERCISES: CPT

## 2020-08-11 PROCEDURE — 20526 THER INJECTION CARP TUNNEL: CPT | Performed by: ORTHOPAEDIC SURGERY

## 2020-08-11 PROCEDURE — G0283 ELEC STIM OTHER THAN WOUND: HCPCS

## 2020-08-11 PROCEDURE — 4004F PT TOBACCO SCREEN RCVD TLK: CPT | Performed by: ORTHOPAEDIC SURGERY

## 2020-08-11 PROCEDURE — G8417 CALC BMI ABV UP PARAM F/U: HCPCS | Performed by: ORTHOPAEDIC SURGERY

## 2020-08-11 PROCEDURE — G8427 DOCREV CUR MEDS BY ELIG CLIN: HCPCS | Performed by: ORTHOPAEDIC SURGERY

## 2020-08-11 PROCEDURE — 97140 MANUAL THERAPY 1/> REGIONS: CPT

## 2020-08-11 PROCEDURE — 99203 OFFICE O/P NEW LOW 30 MIN: CPT | Performed by: ORTHOPAEDIC SURGERY

## 2020-08-11 ASSESSMENT — ENCOUNTER SYMPTOMS
CHEST TIGHTNESS: 0
COLOR CHANGE: 0
EYE REDNESS: 0
SHORTNESS OF BREATH: 0
WHEEZING: 0
EYE PAIN: 0
VOMITING: 0

## 2020-08-11 NOTE — PATIENT INSTRUCTIONS
Carpal tunnel injection   Rest right wrist today   Referral to neurology   Weightbearing and activities as directed   Follow up once workup with Neuro is complete, to discuss further carpal tunnel options

## 2020-08-11 NOTE — FLOWSHEET NOTE
Outpatient Physical Therapy  [place]           [x] Phone: [phone]   Fax: [phone]  [place]           [] Phone: [phone]   Fax: [phone]        Physical Therapy Daily Treatment Note  Date:  2020    Patient Name:  Gail Arzola    :  1980  MRN: [id number]  Restrictions/Precautions:    Diagnosis:    M50.30 cervical DDD  Date of Injury/Surgery:   Treatment Diagnosis:    Neck /arm pain  Insurance/Certification information:   [facility]  Referring Physician:     Next Doctor Visit:    Plan of care signed (Y/N):    Outcome Measure: NDI): 29 = severe disability   Visit# / total visits:  15/30   Pain level: 7/10   Goals:       Short term goals to be achieved by :  Short term goal 1: Pt will report compliance with current HEP as prescribed in order to improve ROM and decrease pain. Short term goal 2: Pt will report headaches no more than 3x/week in order to improve quality of life. Short term goal 3: Pt will demonstrate cervical strength greater than or equal to 4/5 in order to improve strength. Short term goal 4: Pt will demonstrate R  strength greater than or equal to L in order to improve strength. Short term goal 5: Pt will demonstrate a score indicating moderate disability on the NDI in order to improve quality of life.      Long term goals to be achieved by 2020:   Long term goal 1: Pt will demonstrate I with current HEP as prescribed in order to improve strength. Long term goal 2: Pt will demonstrate R UE strength greater than or equal to 4+/5 in order to improve strength. Long term goal 3: Pt will demonstrate a score indicating mild  disability on the NDI in order to improve quality of life. Summary of Evaluation:  refer to evaluation    Subjective:  Patient rates his neck pain 7/10 today. Toss    Any changes in Ambulatory Summary Sheet? No    Objective:        Increased tightness B UT today  Prior to today's treatment session, patient was screened for signs and symptoms related to COVID-19 including but not limited to verbally answering questions related to feeling ill, cough, or SOB, along with taking temperature via forehead thermometer. Patient presented with all negative signs and symptoms and had no fever >100 degrees Fahrenheit this date. Exercises: (No more than 4 columns)   Exercise/Equipment 7/14/2020 7/22/2020 Date: 8/6/2020           WARM UP         UBE   3/3 F/B 3' forward/3' backward         TABLE      Manual therapy  See below See below   AROM  Not performed    Scapular retraction  1x15  1x20   Shoulder elevation/depression  1x15 1x20   Scapular punches  1x15 1x20   Prone press ups  Not performed    Prone HABD  Not performed       Prone row  Not performed     seated  Scapular protraction 1x15                                             PROPRIOCEPTION                                    MODALITIES See below See below See below                 Other Therapeutic Activities/Education:  Pt educated on EMG findings and myofacial problems. Home Exercise Program:  Pt to continue current HEP. Manual Treatments:  Patient seated manual therapy STM/ TPR to B UT, MT, and LT as well as intrascapular muscles using elbow to go deeper. Modalities:  Interferrential x15' seated to B UT/MT    Communication with other providers: none    Assessment:  (Response towards treatment session) (Pain Rating)  Pain rated 5/10 after treatment with no HA. States that he was having more aching after treatment than pain.   Plan for Next Session:  Continue with manual therapy focusing on L side    Time In / Time Out:   1120/1210    Timed Code/Total Treatment Minutes:  48' (15' ES, 20' TE, 15' MT)  Next Progress Note due:  7/21/2020    Plan of Care Interventions:  [x] Therapeutic Exercise  [] Modalities:  [x] Therapeutic Activity     [x] Ultrasound  [x] Estim  [] Gait Training      [x] Cervical Traction [] Lumbar Traction  [x] Neuromuscular

## 2020-08-11 NOTE — PROGRESS NOTES
Patient here for a new patient consult per MARAH Morse CNP for evaluation of bilateral carpal tunnel syndrome. Right>left he has a history of Right CTR years ago and no surgeries on the left. He does have chronic neck issues and states the pain does radiate into the hands. He is attending PT for his neck which is helping with his symptoms overall. Provider needs to conduct a hands on physical today due to patients injury/diagnosis    EMG IMPRESSION 7/31/2020:                  1.  Mildly abnormal EMG. There are very mild median neuropathies at each wrist (electrically mild bilateral carpal tunnel syndrome). Perhaps mild carpal tunnel syndrome is disturbing his deep sleep and indirectly triggering cervical myalgias. I do not believe his carpal tunnel syndrome directly explains his complaints of headache.                 2.  No evidence of a focal spinal nerve root injury (radiculopathy), plexopathy, generalized neuropathy or primary muscle disease.           Clinically, he seems to exhibit several historical and physical exam findings of secondary myofascial pain syndrome possibly associated with cervical spondylosis.          Thank you for this interesting referral.

## 2020-08-12 ENCOUNTER — HOSPITAL ENCOUNTER (OUTPATIENT)
Dept: ULTRASOUND IMAGING | Age: 40
Discharge: HOME | End: 2020-08-12
Payer: COMMERCIAL

## 2020-08-12 PROCEDURE — 76775 US EXAM ABDO BACK WALL LIM: CPT

## 2020-08-12 NOTE — PROGRESS NOTES
Perhaps mild carpal tunnel syndrome is disturbing his deep sleep and indirectly triggering cervical myalgias.  I do not believe his carpal tunnel syndrome directly explains his complaints of headache.                 2.  No evidence of a focal spinal nerve root injury (radiculopathy), plexopathy, generalized neuropathy or primary muscle disease.           Clinically, he seems to exhibit several historical and physical exam findings of secondary myofascial pain syndrome possibly associated with cervical spondylosis. Medical History  Patient's medications, allergies, past medical, surgical, social and family histories were reviewed and updated as appropriate.     Past Medical History:   Diagnosis Date    Abnormal liver function tests     scheduled for liver bx 10/4/2013    Anxiety     Asthma     last flare up winter 2019    Chronic abdominal pain     COPD (chronic obstructive pulmonary disease) (HCC)     Depression     FAP (familial adenomatous polyposis)     family hx of this disorder per h&p    Hepatitis C 2006    \"took medication for this and no longer shows up in my blood\"    History of heroin use 07/16/2018    In the ER February 2015 with acute heroin overdose    History of kidney stones 03/27/2018    \"have stone now\"7/15/2020 follow with Dr Bozena Montero Hypertension     Sleep apnea     sleep study 2017- had cpap    Wears glasses     to read     Family History   Problem Relation Age of Onset    COPD Mother     Diabetes Father     Heart Attack Father     Heart Surgery Father     Heart Disease Father         Mi, chf     Social History     Socioeconomic History    Marital status:      Spouse name: None    Number of children: None    Years of education: None    Highest education level: None   Occupational History    None   Social Needs    Financial resource strain: None    Food insecurity     Worry: None     Inability: None    Transportation needs     Medical: None Non-medical: None   Tobacco Use    Smoking status: Current Every Day Smoker     Packs/day: 1.00     Years: 18.00     Pack years: 18.00     Types: Cigarettes    Smokeless tobacco: Never Used   Substance and Sexual Activity    Alcohol use: Not Currently     Comment: has not drank for over a year/ per pt on 7/15/2020\"drink one time per month-\"    Drug use: No     Types: Cocaine, IV, Other-see comments     Comment: denies any use since Feb 2015    Sexual activity: Yes   Lifestyle    Physical activity     Days per week: None     Minutes per session: None    Stress: None   Relationships    Social connections     Talks on phone: None     Gets together: None     Attends Oriental orthodox service: None     Active member of club or organization: None     Attends meetings of clubs or organizations: None     Relationship status: None    Intimate partner violence     Fear of current or ex partner: None     Emotionally abused: None     Physically abused: None     Forced sexual activity: None   Other Topics Concern    None   Social History Narrative    None     Current Outpatient Medications   Medication Sig Dispense Refill    montelukast (SINGULAIR) 10 MG tablet TAKE 1 TABLET BY MOUTH EVERY DAY AT NIGHT      tamsulosin (FLOMAX) 0.4 MG capsule TAKE 1 CAPSULE BY MOUTH DAILY      ondansetron (ZOFRAN ODT) 4 MG disintegrating tablet Take 1 tablet by mouth every 6 hours as needed for Nausea or Vomiting 10 tablet 0    buPROPion (WELLBUTRIN XL) 300 MG extended release tablet TAKE 1 TABLET BY MOUTH EVERY DAY IN THE MORNING 90 tablet 1    losartan-hydroCHLOROthiazide (HYZAAR) 50-12.5 MG per tablet TAKE 1 TABLET BY MOUTH EVERY DAY 30 tablet 2    fluticasone-salmeterol (ADVAIR DISKUS) 250-50 MCG/DOSE AEPB Inhale 1 puff into the lungs every 12 hours 60 each 5    albuterol sulfate HFA (VENTOLIN HFA) 108 (90 Base) MCG/ACT inhaler Inhale 2 puffs into the lungs every 4 hours as needed for Wheezing or Shortness of Breath 1 Inhaler 5    gabapentin (NEURONTIN) 600 MG tablet Take 1 tablet by mouth 3 times daily for 90 days. 270 tablet 0    ipratropium-albuterol (DUONEB) 0.5-2.5 (3) MG/3ML SOLN nebulizer solution Inhale 3 mLs into the lungs every 6 hours as needed for Shortness of Breath 360 mL 0    Nebulizer System All-In-One MISC Nebulizer unit compressor unit for nebulized breathing treatments 1 each 0    Misc. Devices MISC 1 each by Does not apply route once for 1 dose BP cuff/monitor 1 Device 0     No current facility-administered medications for this visit. Allergies   Allergen Reactions    Ceclor [Cefaclor] Anaphylaxis     trouble breathing\"    Cephalexin Anaphylaxis     \"trouble breathing\"    Penicillins Anaphylaxis     \"can not breathe and swell up\"    Tetracyclines & Related Anaphylaxis     \"trouble breathing\"    Tape [Adhesive Tape]      \"the paper tape makes a big william on me\"    Tetracycline      Other reaction(s): Anaphylaxis    Amlodipine Swelling and Rash    Chantix [Varenicline] Other (See Comments)     Hostile towards wife    Lisinopril Other (See Comments)     Throat \"irritated\"    Phenergan [Promethazine Hcl] Other (See Comments)     Mood altering       Review of Systems:   Review of Systems   Constitutional: Negative for chills and fever. HENT: Negative for congestion and sneezing. Eyes: Negative for pain and redness. Respiratory: Negative for chest tightness, shortness of breath and wheezing. Cardiovascular: Negative for chest pain and palpitations. Gastrointestinal: Negative for vomiting. Musculoskeletal: Positive for arthralgias, joint swelling, myalgias, neck pain and neck stiffness. Skin: Negative for color change and rash. Neurological: Positive for weakness and numbness. Psychiatric/Behavioral: Negative for agitation. The patient is not nervous/anxious.                                                 Examination:  General Exam:  Vitals: Resp 16   Ht 6' (1.829 m)   Wt 256 lb 8 oz (116.3 kg)   BMI 34.79 kg/m²    Physical Exam  Constitutional:       Appearance: Normal appearance. HENT:      Head: Normocephalic and atraumatic. Eyes:      Extraocular Movements: Extraocular movements intact. Pupils: Pupils are equal, round, and reactive to light. Neck:      Musculoskeletal: Normal range of motion. Pulmonary:      Effort: Pulmonary effort is normal.   Musculoskeletal:      Right elbow: He exhibits normal range of motion, no swelling, no effusion and no deformity. No tenderness found. Left elbow: He exhibits normal range of motion, no swelling, no effusion and no deformity. No tenderness found. Right forearm: Normal.      Left forearm: Normal.      Comments: Right Upper Extremity:    There is mild tenderness to palpation of the volar aspect of the wrist at the level of the carpal tunnel. There is decreased sensation to light touch in the median nerve distribution and extending to the ulnar distribution. Sensation is intact to light touch along the radial nerves. Positive carpal compression test and Phalen's test.  There is mild relative weakness with 4+ strength out of 5 during  test, pinch test, and flexor pollicis brevis. Range of motion of the wrist and fingers is intact without limitation. Skin is intact without wounds or rash. 2+ radial pulse with brisk cap refill throughout the fingers. No atrophy of the thenar musculature. Strength 5/5 in finger and wrist flexion and extension. Well-healed surgical incision overlying the area of the carpal tunnel          Left upper extremity:  There is mild tenderness to palpation of the volar aspect of the wrist at the level of the carpal tunnel. There is mild decreased sensation to light touch in the median nerve distribution extending to the ulnar nerve distribution. Sensation is intact along the and radial nerves.   Positive carpal compression test and Phalen's test.  There is no weakness with during  test, pinch test, and flexor pollicis brevis. Range of motion of the wrist and fingers is intact without limitation. Skin is intact. 2+ radial pulse with brisk cap refill. No of the thenar musculature       Skin:     General: Skin is warm and dry. Neurological:      General: No focal deficit present. Mental Status: He is alert and oriented to person, place, and time. Psychiatric:         Mood and Affect: Mood normal.            Office Procedures:  Orders Placed This Encounter   Procedures    External Referral To Neurology     Referral Priority:   Routine     Referral Type:   Eval and Treat     Referral Reason:   Specialty Services Required     Requested Specialty:   Neurology     Number of Visits Requested:   1    INJECT CARPAL TUNNEL       Assessment and Plan  1. Bilateral right worse than left carpal tunnel syndrome    2. Idiopathic neuropathy    I explained the patient that his EMG results revealed mild carpal tunnel syndrome. Given his history I am concerned about an underlying neuropathy affecting his hands and feet. We also discussed the potential for nerve compression in his cervical spine to be contributing to these symptoms. Given the complexity of the situation I recommended that he have a consultation by a neurologist to see if there is a specific cause for his ongoing neuropathies. I have sent a referral to [facility] for evaluation of his neuropathy. I have recommended conservative measures at this time for his carpal tunnel syndrome. I have recommended a steroid injection to see if this may help with some of the swelling and symptoms in his right hand. Right carpal tunnel injection procedure note: The right wrist was prepped with alcohol and the carpal tunnel was injected with a 25-gauge needle and 20 mg of Kenalog and 1 mL of 1% plain lidocaine was injected. He tolerated the procedure well. Band-Aid was applied.     I have recommended continued conservative treatments including rest, activity modification, stretching, bracing, and anti-inflammatory medications.     Follow-up after the neurology consult for discussion of his response to the injection and discussion of pertinent findings from the consult      Electronically signed by Christine Armstrong MD on 8/11/2020 at 8:45 PM

## 2020-08-13 ENCOUNTER — HOSPITAL ENCOUNTER (OUTPATIENT)
Dept: PHYSICAL THERAPY | Age: 40
Setting detail: THERAPIES SERIES
Discharge: HOME | End: 2020-08-13
Payer: COMMERCIAL

## 2020-08-13 PROCEDURE — 97140 MANUAL THERAPY 1/> REGIONS: CPT

## 2020-08-13 PROCEDURE — 97110 THERAPEUTIC EXERCISES: CPT

## 2020-08-13 PROCEDURE — G0283 ELEC STIM OTHER THAN WOUND: HCPCS

## 2020-08-13 NOTE — FLOWSHEET NOTE
Outpatient Physical Therapy  [place]           [x] Phone: [phone]   Fax: [phone]  [place]           [] Phone: [phone]   Fax: [phone]        Physical Therapy Daily Treatment Note  Date:  2020    Patient Name:  Cailin Russ    :  1980  MRN: [id number]  Restrictions/Precautions:    Diagnosis:    M50.30 cervical DDD  Date of Injury/Surgery:   Treatment Diagnosis:    Neck /arm pain  Insurance/Certification information:   [facility]  Referring Physician:     Next Doctor Visit:    Plan of care signed (Y/N):    Outcome Measure: NDI): 29 = severe disability   Visit# / total visits:     Pain level: 6/10   Goals:       Short term goals to be achieved by :  Short term goal 1: Pt will report compliance with current HEP as prescribed in order to improve ROM and decrease pain. Short term goal 2: Pt will report headaches no more than 3x/week in order to improve quality of life. Short term goal 3: Pt will demonstrate cervical strength greater than or equal to 4/5 in order to improve strength. Short term goal 4: Pt will demonstrate R  strength greater than or equal to L in order to improve strength. Short term goal 5: Pt will demonstrate a score indicating moderate disability on the NDI in order to improve quality of life.      Long term goals to be achieved by 2020:   Long term goal 1: Pt will demonstrate I with current HEP as prescribed in order to improve strength. Long term goal 2: Pt will demonstrate R UE strength greater than or equal to 4+/5 in order to improve strength. Long term goal 3: Pt will demonstrate a score indicating mild  disability on the NDI in order to improve quality of life. Summary of Evaluation:  refer to evaluation    Subjective: Patient rates his neck pain 6/10 today. Had a HA yesterday, but no HA today. Saw the orthopedic surgeon on Tuesday.   Gave him a cortisone shot for the carpal tunnel and is referring him Therapeutic Activity     [x] Ultrasound  [x] Estim  [] Gait Training      [x] Cervical Traction [] Lumbar Traction  [x] Neuromuscular Re-education    [x] Cold/hotpack [] Iontophoresis   [x] Instruction in HEP      [] Vasopneumatic   [] Dry Needling    [x] Manual Therapy               [x] Aquatic Therapy              Electronically signed by:  Vasu Steiner   8/13/2020, 8:58 AM

## 2020-08-18 ENCOUNTER — HOSPITAL ENCOUNTER (OUTPATIENT)
Dept: PHYSICAL THERAPY | Age: 40
Setting detail: THERAPIES SERIES
Discharge: HOME | End: 2020-08-18
Payer: COMMERCIAL

## 2020-08-18 PROCEDURE — 97110 THERAPEUTIC EXERCISES: CPT

## 2020-08-18 PROCEDURE — 97140 MANUAL THERAPY 1/> REGIONS: CPT

## 2020-08-18 PROCEDURE — G0283 ELEC STIM OTHER THAN WOUND: HCPCS

## 2020-08-18 NOTE — FLOWSHEET NOTE
Outpatient Physical Therapy  [place]           [x] Phone: [phone]   Fax: [phone]  [place]           [] Phone: [phone]   Fax: [phone]        Physical Therapy Daily Treatment Note  Date:  2020    Patient Name:  Belinda Cabot    :  1980  MRN: [id number]  Restrictions/Precautions:    Diagnosis:    M50.30 cervical DDD  Date of Injury/Surgery:   Treatment Diagnosis:    Neck /arm pain  Insurance/Certification information:   [facility]  Referring Physician:     Next Doctor Visit:    Plan of care signed (Y/N):    Outcome Measure: NDI): 29 = severe disability   Visit# / total visits:     Pain level: 7/10   Goals:       Short term goals to be achieved by :  Short term goal 1: Pt will report compliance with current HEP as prescribed in order to improve ROM and decrease pain. Short term goal 2: Pt will report headaches no more than 3x/week in order to improve quality of life. Short term goal 3: Pt will demonstrate cervical strength greater than or equal to 4/5 in order to improve strength. Short term goal 4: Pt will demonstrate R  strength greater than or equal to L in order to improve strength. Short term goal 5: Pt will demonstrate a score indicating moderate disability on the NDI in order to improve quality of life.      Long term goals to be achieved by 2020:   Long term goal 1: Pt will demonstrate I with current HEP as prescribed in order to improve strength. Long term goal 2: Pt will demonstrate R UE strength greater than or equal to 4+/5 in order to improve strength. Long term goal 3: Pt will demonstrate a score indicating mild  disability on the NDI in order to improve quality of life. Summary of Evaluation:  refer to evaluation    Subjective: Patient rates his neck pain 6/10 today. Had a HA yesterday, but no HA today. Saw the orthopedic surgeon on Tuesday.   Gave him a cortisone shot for the carpal tunnel and is referring him to a neurologist.    Any changes in Ambulatory Summary Sheet? No    Objective: Increased tightness B UT today  Prior to today's treatment session, patient was screened for signs and symptoms related to COVID-19 including but not limited to verbally answering questions related to feeling ill, cough, or SOB, along with taking temperature via forehead thermometer. Patient presented with all negative signs and symptoms and had no fever >100 degrees Fahrenheit this date. Exercises: (No more than 4 columns)   Exercise/Equipment 7/22/2020 Date: 8/6/2020 8/13/2020 Date: 8/18/2020            WARM UP          UBE  3/3 F/B 3' forward/3' backward 3'F/3'B 3' forward and backward          TABLE       Manual therapy See below See below  See below   AROM Not performed   Not performed   Scapular retraction 1x15  1x20 20x 1x10   Shoulder elevation/depression 1x15 1x20 20x 1x10   Scapular punches 1x15 1x20 20x 1x10   Prone press ups Not performed      Prone HABD Not performed         Prone row Not performed       seated Scapular protraction 1x15                                                     PROPRIOCEPTION                                          MODALITIES See below See below See below See below                   Other Therapeutic Activities/Education:  none     Home Exercise Program:  Pt to continue current HEP. Manual Treatments:  Patient seated manual therapy STM/ TPR to B UT, MT, and LT as well as intrascapular muscles using elbow to go deeper. Modalities:  Interferrential x15' seated to B UT/MT    Communication with other providers: none    Assessment:  (Response towards treatment session) (Pain Rating)  Pain rated 5/10 after treatment with no HA. Feels much looser.     Plan for Next Session:  Continue with manual therapy focusing on L side    Time In / Time Out:  5:00-5:55 pm    Timed Code/Total Treatment Minutes:  54' 2 manual, 1 therapeutic exercise, 1 e-stim  Next Progress Note due: 7/21/2020    Plan of Care Interventions:  [x] Therapeutic Exercise  [] Modalities:  [x] Therapeutic Activity     [x] Ultrasound  [x] Estim  [] Gait Training      [x] Cervical Traction [] Lumbar Traction  [x] Neuromuscular Re-education    [x] Cold/hotpack [] Iontophoresis   [x] Instruction in HEP      [] Vasopneumatic   [] Dry Needling    [x] Manual Therapy               [x] Aquatic Therapy              Electronically signed by:  Reina Estrella DPT [id number]    8/18/2020, 5:00 PM

## 2020-08-26 ENCOUNTER — TELEPHONE (OUTPATIENT)
Dept: FAMILY MEDICINE CLINIC | Age: 40
End: 2020-08-26

## 2020-08-26 NOTE — TELEPHONE ENCOUNTER
Bishop Zarate from [address] and stated the [employer]ie Paci is not covered his insurance will cover the brand [employer]ort

## 2020-08-27 ENCOUNTER — HOSPITAL ENCOUNTER (OUTPATIENT)
Dept: PHYSICAL THERAPY | Age: 40
Discharge: HOME | End: 2020-08-27

## 2020-08-27 RX ORDER — BUDESONIDE AND FORMOTEROL FUMARATE DIHYDRATE 160; 4.5 UG/1; UG/1
2 AEROSOL RESPIRATORY (INHALATION) 2 TIMES DAILY
Qty: 1 INHALER | Refills: 5 | Status: SHIPPED | OUTPATIENT
Start: 2020-08-27 | End: 2021-02-01

## 2020-08-27 NOTE — FLOWSHEET NOTE
Physical Therapy  Cancellation/No-show Note  Patient Name:  Ninoska Chawla  :  1980   Date:  2020  Cancelled visits to date: 2  No-shows to date: 0    For today's appointment patient:  [x]  Cancelled  []  Rescheduled appointment  []  No-show     Reason given by patient:  []  Patient ill  []  Conflicting appointment  []  No transportation    []  Conflict with work  [x]  No reason given  []  Other:     Comments:      Electronically signed by:  Arnie Brittle, PTA

## 2020-09-22 RX ORDER — MELOXICAM 7.5 MG/1
TABLET ORAL
Qty: 30 TABLET | Refills: 3 | Status: SHIPPED | OUTPATIENT
Start: 2020-09-22 | End: 2020-10-30

## 2020-10-30 ENCOUNTER — OFFICE VISIT (OUTPATIENT)
Dept: FAMILY MEDICINE CLINIC | Age: 40
End: 2020-10-30
Payer: COMMERCIAL

## 2020-10-30 VITALS
BODY MASS INDEX: 36.59 KG/M2 | DIASTOLIC BLOOD PRESSURE: 90 MMHG | RESPIRATION RATE: 16 BRPM | OXYGEN SATURATION: 98 % | HEART RATE: 92 BPM | TEMPERATURE: 98.2 F | WEIGHT: 269.8 LBS | SYSTOLIC BLOOD PRESSURE: 158 MMHG

## 2020-10-30 PROCEDURE — 4004F PT TOBACCO SCREEN RCVD TLK: CPT | Performed by: NURSE PRACTITIONER

## 2020-10-30 PROCEDURE — G8427 DOCREV CUR MEDS BY ELIG CLIN: HCPCS | Performed by: NURSE PRACTITIONER

## 2020-10-30 PROCEDURE — G8484 FLU IMMUNIZE NO ADMIN: HCPCS | Performed by: NURSE PRACTITIONER

## 2020-10-30 PROCEDURE — 99214 OFFICE O/P EST MOD 30 MIN: CPT | Performed by: NURSE PRACTITIONER

## 2020-10-30 PROCEDURE — 93000 ELECTROCARDIOGRAM COMPLETE: CPT | Performed by: NURSE PRACTITIONER

## 2020-10-30 PROCEDURE — G8417 CALC BMI ABV UP PARAM F/U: HCPCS | Performed by: NURSE PRACTITIONER

## 2020-10-30 RX ORDER — BUPROPION HYDROCHLORIDE 300 MG/1
300 TABLET ORAL EVERY MORNING
Qty: 90 TABLET | Refills: 0 | Status: SHIPPED | OUTPATIENT
Start: 2020-10-30 | End: 2020-12-31 | Stop reason: SDUPTHER

## 2020-10-30 RX ORDER — DULOXETIN HYDROCHLORIDE 30 MG/1
30 CAPSULE, DELAYED RELEASE ORAL DAILY
Qty: 30 CAPSULE | Refills: 0 | Status: SHIPPED | OUTPATIENT
Start: 2020-10-30 | End: 2020-12-01 | Stop reason: DRUGHIGH

## 2020-10-30 RX ORDER — GABAPENTIN 600 MG/1
600 TABLET ORAL 3 TIMES DAILY
Qty: 270 TABLET | Refills: 0 | Status: SHIPPED | OUTPATIENT
Start: 2020-10-30 | End: 2020-12-01 | Stop reason: DRUGHIGH

## 2020-10-30 ASSESSMENT — ENCOUNTER SYMPTOMS: RESPIRATORY NEGATIVE: 1

## 2020-10-30 NOTE — PROGRESS NOTES
Subjective:      Chief Complaint   Patient presents with    3 Month Follow-Up     Pt here for 3 month follow up    Neck Pain     Pt c/o neck pain and still has some tingling down arm       HPI:  Brittany Kumar is a 41 y.o. male who presents today for 3 month follow up. Anxiety  Patient is here for evaluation of anxiety. He has the following anxiety symptoms: chest pain, difficulty concentrating, dizziness, fatigue, feelings of losing control, insomnia, irritable, paresthesias, and racing thoughts. He is currently on Wellbutrin  mg daily. He denies AVH/SIB/SI/HI. Chronic Neck Pain   Pain is unchanged. On average, pain is perceived as moderate (4-6 pain scale). Change in quality of symptoms: no.  Associated symptoms: weakness, paresthesias and edema in bilateral hands. He denies: any other symptoms. Current treatment: rest, ice or Ibuprofen. Recent diagnostic testing: x-ray of cervical spine completed 05/12/2020 showing mild degenerative changes with mild narrowing of the right C4/C5 neural foramen. He was referred to Neurology on 08/11/2020 but has not scheduled an appointment at this time. Pain Management referral placed on 08/06/2020. He was seen and the plan was to start injections but he returned to work at [employer] and has not been able to schedule any appointments including PT. Hypertension  Patient is here for follow-up of elevated blood pressure. He is not exercising and is adherent to a low-salt diet. He is taking medication as prescribed. He does not check his BP at home. Patient denies chest pain, chest pressure/discomfort, claudication, dyspnea, exertional chest pressure/discomfort, irregular heart beat, lower extremity edema, near-syncope, orthopnea, palpitations, paroxysmal nocturnal dyspnea and syncope.      Right knee pain  He endorses with knee swelling involving both knees, symptoms are worse on the right. Onset of the symptoms was several weeks ago.  Inciting event: none known. Current symptoms include crepitus sensation, foreign body sensation, popping sensation, stiffness and swelling. Pain is aggravated by any weight bearing. Patient has had no prior knee problems. Evaluation to date: none. Treatment to date: none. Past Medical History:   Diagnosis Date    Abnormal liver function tests     scheduled for liver bx 10/4/2013    Anxiety     Asthma     last flare up winter 2019    Chronic abdominal pain     COPD (chronic obstructive pulmonary disease) (HCC)     Depression     FAP (familial adenomatous polyposis)     family hx of this disorder per h&p    Hepatitis C 2006    \"took medication for this and no longer shows up in my blood\"    History of heroin use 07/16/2018    In the ER February 2015 with acute heroin overdose    History of kidney stones 03/27/2018    \"have stone now\"7/15/2020 follow with Dr Saji Claudio Hypertension     Sleep apnea     sleep study 2017- had cpap    Wears glasses     to read        Social History     Tobacco Use    Smoking status: Current Every Day Smoker     Packs/day: 1.00     Years: 18.00     Pack years: 18.00     Types: Cigarettes    Smokeless tobacco: Never Used   Substance Use Topics    Alcohol use: Not Currently     Comment: has not drank for over a year/ per pt on 7/15/2020\"drink one time per month-\"        Review of Systems   Constitutional: Negative. Eyes: Negative. Respiratory: Negative. Cardiovascular: Negative. Endocrine: Negative. Genitourinary: Negative. Musculoskeletal: Positive for arthralgias, joint swelling, myalgias, neck pain and neck stiffness. Skin: Negative. Neurological: Negative. Psychiatric/Behavioral: Positive for decreased concentration, dysphoric mood and sleep disturbance. Negative for suicidal ideas. The patient is nervous/anxious and is hyperactive.             Objective:      BP (!) 158/90 (Site: Left Upper Arm, Position: Sitting, Cuff Size: Medium Adult)   Pulse 92 Assessment / Plan:      1. Anxiety  Will continue wellbutrin and start cymbalta for management of both pain and anxiety. - buPROPion (WELLBUTRIN XL) 300 MG extended release tablet; Take 1 tablet by mouth every morning  Dispense: 90 tablet; Refill: 0    2. Chest pain, unspecified type  EKG shows NSR - likely related to increased anxiety  - EKG 12 lead    3. Degenerative disc disease, cervical  Recommend follow up with neurology, pain management and PT. 4. Chronic left-sided low back pain with left-sided sciatica  - DULoxetine (CYMBALTA) 30 MG extended release capsule; Take 1 capsule by mouth daily  Dispense: 30 capsule; Refill: 0  - gabapentin (NEURONTIN) 600 MG tablet; Take 1 tablet by mouth 3 times daily for 90 days. Dispense: 270 tablet; Refill: 0    5. Acute pain of right knee  Will x-ray knee and determine plan of care after review or x-ray. Apply ice 20 minutes per hour several times throughout the day. Rest and elevate affected joint to help decrease swelling. Compress as needed; can use ACE bandage to help with swelling and stability. NSAIDs for pain and swelling as needed. - XR KNEE RIGHT (3 VIEWS);  Future          Beatris Greenwood, MARAH - CNP

## 2020-11-04 ASSESSMENT — ENCOUNTER SYMPTOMS: EYES NEGATIVE: 1

## 2020-11-10 RX ORDER — MONTELUKAST SODIUM 10 MG/1
TABLET ORAL
Qty: 90 TABLET | Refills: 0 | Status: SHIPPED | OUTPATIENT
Start: 2020-11-10 | End: 2020-12-31 | Stop reason: SDUPTHER

## 2020-12-01 ENCOUNTER — VIRTUAL VISIT (OUTPATIENT)
Dept: FAMILY MEDICINE CLINIC | Age: 40
End: 2020-12-01
Payer: COMMERCIAL

## 2020-12-01 PROCEDURE — G8427 DOCREV CUR MEDS BY ELIG CLIN: HCPCS | Performed by: NURSE PRACTITIONER

## 2020-12-01 PROCEDURE — 99213 OFFICE O/P EST LOW 20 MIN: CPT | Performed by: NURSE PRACTITIONER

## 2020-12-01 RX ORDER — BENZONATATE 200 MG/1
200 CAPSULE ORAL 3 TIMES DAILY PRN
Qty: 30 CAPSULE | Refills: 0 | Status: SHIPPED | OUTPATIENT
Start: 2020-12-01 | End: 2020-12-08

## 2020-12-01 RX ORDER — DULOXETIN HYDROCHLORIDE 60 MG/1
60 CAPSULE, DELAYED RELEASE ORAL DAILY
Qty: 30 CAPSULE | Refills: 0 | Status: SHIPPED | OUTPATIENT
Start: 2020-12-01 | End: 2020-12-28

## 2020-12-01 RX ORDER — GABAPENTIN 800 MG/1
800 TABLET ORAL 3 TIMES DAILY
Qty: 90 TABLET | Refills: 0 | Status: SHIPPED | OUTPATIENT
Start: 2020-12-01 | End: 2020-12-31 | Stop reason: SDUPTHER

## 2020-12-01 NOTE — PROGRESS NOTES
2020    TELEHEALTH EVALUATION -- Audio/Visual (During UUFAV-36 public health emergency)    HPI:    Vero Holly (:  1980) has requested an audio/video evaluation for the following concern(s):       Chronic Neck Pain   Pain is unchanged.  On average, pain is perceived as moderate (4-6 pain scale).  Change in quality of symptoms: no.  Associated symptoms: weakness, paresthesias and edema in bilateral hands. He denies: any other symptoms.  Current treatment: rest, ice or Ibuprofen. Recent diagnostic testing: x-ray of cervical spine completed 2020 showing mild degenerative changes with mild narrowing of the right C4/C5 neural foramen. He was referred to Neurology on 2020 but has not scheduled an appointment at this time. Pain Management referral placed on 2020. He was seen and the plan was to start injections but he returned to work at [employer] and has not been able to schedule any appointment. Reports physical therapy was not helpful and symptoms were often worse an hour after therapy. COPD  Current treatment includes short-acting beta agonist inhaler, combined beta agonist/steroid inhaler, combined beta agonist/antichoinergic inhaler. Residual symptoms: non-productive cough. He denies any other symptoms. He requires his rescue inhaler several time(s) per week. Review of Systems   Constitutional: Negative. Eyes: Negative. Respiratory: Positive for cough (non-productive). Negative for chest tightness, shortness of breath and wheezing. Cardiovascular: Negative. Endocrine: Negative. Genitourinary: Negative. Musculoskeletal: Positive for arthralgias, joint swelling, myalgias, neck pain and neck stiffness. Skin: Negative. Neurological: Positive for weakness (bilateral UE) and numbness (bilateral UE). Psychiatric/Behavioral: Positive for decreased concentration, dysphoric mood and sleep disturbance. Negative for suicidal ideas.  The patient is nervous/anxious and is hyperactive. Prior to Visit Medications    Medication Sig Taking? Authorizing Provider   benzonatate (TESSALON) 200 MG capsule Take 1 capsule by mouth 3 times daily as needed for Cough Yes MARAH Donaldson CNP   gabapentin (NEURONTIN) 800 MG tablet Take 1 tablet by mouth 3 times daily for 30 days. Yes MARAH Guillory CNP   DULoxetine (CYMBALTA) 60 MG extended release capsule Take 1 capsule by mouth daily Yes MARAH Guillory CNP   montelukast (SINGULAIR) 10 MG tablet TAKE 1 TABLET BY MOUTH EVERY DAY AT NIGHT Yes Alberta Barfield PA-C   buPROPion (WELLBUTRIN XL) 300 MG extended release tablet Take 1 tablet by mouth every morning Yes MARAH Donaldson CNP   budesonide-formoterol (SYMBICORT) 160-4.5 MCG/ACT AERO Inhale 2 puffs into the lungs 2 times daily Yes MARAH Donaldsno CNP   losartan-hydroCHLOROthiazide (HYZAAR) 50-12.5 MG per tablet TAKE 1 TABLET BY MOUTH EVERY DAY Yes MARAH Donaldson CNP   tamsulosin (FLOMAX) 0.4 MG capsule TAKE 1 CAPSULE BY MOUTH DAILY Yes Historical Provider, MD   ondansetron (ZOFRAN ODT) 4 MG disintegrating tablet Take 1 tablet by mouth every 6 hours as needed for Nausea or Vomiting Yes Nya Bevel, DO   albuterol sulfate HFA (VENTOLIN HFA) 108 (90 Base) MCG/ACT inhaler Inhale 2 puffs into the lungs every 4 hours as needed for Wheezing or Shortness of Breath Yes MARAH Donaldson CNP   ipratropium-albuterol (DUONEB) 0.5-2.5 (3) MG/3ML SOLN nebulizer solution Inhale 3 mLs into the lungs every 6 hours as needed for Shortness of Breath Yes Nya Bevel, DO   Nebulizer System All-In-One MISC Nebulizer unit compressor unit for nebulized breathing treatments Yes Nya Bevel, DO   Misc.  Devices MISC 1 each by Does not apply route once for 1 dose BP cuff/monitor Yes MARAH Donaldson CNP       Social History     Tobacco Use    Smoking status: Current Every Day Smoker     Packs/day: 1.00     Years: 18.00     Pack years: 18.00     Types: Cigarettes    Smokeless tobacco: Never Used   Substance Use Topics    Alcohol use: Not Currently     Comment: has not drank for over a year/ per pt on 7/15/2020\"drink one time per month-\"    Drug use: No     Types: Cocaine, IV, Other-see comments     Comment: denies any use since Feb 2015        Allergies   Allergen Reactions    Ceclor [Cefaclor] Anaphylaxis     trouble breathing\"    Cephalexin Anaphylaxis     \"trouble breathing\"    Penicillins Anaphylaxis     \"can not breathe and swell up\"    Tetracyclines & Related Anaphylaxis     \"trouble breathing\"    Tape [Adhesive Tape]      \"the paper tape makes a big william on me\"    Tetracycline      Other reaction(s):  Anaphylaxis    Amlodipine Swelling and Rash    Chantix [Varenicline] Other (See Comments)     Hostile towards wife    Lisinopril Other (See Comments)     Throat \"irritated\"    Phenergan [Promethazine Hcl] Other (See Comments)     Mood altering   ,   Past Medical History:   Diagnosis Date    Abnormal liver function tests     scheduled for liver bx 10/4/2013    Anxiety     Asthma     last flare up winter 2019    Chronic abdominal pain     COPD (chronic obstructive pulmonary disease) ([facility] [address]     Depression     FAP (familial adenomatous polyposis)     family hx of this disorder per h&p    Hepatitis C 2006    \"took medication for this and no longer shows up in my blood\"    History of heroin use 07/16/2018    In the ER February 2015 with acute heroin overdose    History of kidney stones 03/27/2018    \"have stone now\"7/15/2020 follow with Dr Florin Lang Hypertension     Sleep apnea     sleep study 2017- had cpap    Wears glasses     to read       PHYSICAL EXAMINATION:  [ INSTRUCTIONS:  \"[x]\" Indicates a positive item  \"[]\" Indicates a negative item  -- DELETE ALL ITEMS NOT EXAMINED]    Constitutional: [x] Appears well-developed and well-nourished [x] No apparent distress      [] Abnormal-   Mental status  [] Alert and awake  [] Oriented to person/place/time []Able to follow commands      Eyes:  EOM    [x]  Normal  [] Abnormal-  Sclera  [x]  Normal  [] Abnormal -         Discharge [x]  None visible  [] Abnormal -    HENT:   [x] Normocephalic, atraumatic. [] Abnormal   [x] Mouth/Throat: Mucous membranes are moist.     External Ears [x] Normal  [] Abnormal-     Neck: [x] No visualized mass       Pulmonary/Chest: [x] Respiratory effort normal.  [] No visualized signs of difficulty breathing or respiratory distress        [] Abnormal-      Musculoskeletal:   [] Normal gait with no signs of ataxia         [] Normal range of motion of neck        [] Abnormal- Decreased ROM noted. Patient grimacing with extension and flexion      Neurological:        [] No Facial Asymmetry (Cranial nerve 7 motor function) (limited exam to video visit)          [] No gaze palsy        [] Abnormal-         Skin:        [x] No significant exanthematous lesions or discoloration noted on facial skin         [] Abnormal-            Psychiatric:       [x] Normal Affect [x] No Hallucinations        [] Abnormal-         ASSESSMENT/PLAN:  1. Degenerative disc disease, cervical  Will order MRI. Patient unable to tolerate physical therapy. Will increase gabapentin and Cymbala for pain  - MRI CERVICAL SPINE WO CONTRAST; Future  - gabapentin (NEURONTIN) 800 MG tablet; Take 1 tablet by mouth 3 times daily for 30 days. Dispense: 90 tablet; Refill: 0  - DULoxetine (CYMBALTA) 60 MG extended release capsule; Take 1 capsule by mouth daily  Dispense: 30 capsule; Refill: 0    2. Paresthesia of both hands  - MRI CERVICAL SPINE WO CONTRAST; Future  - gabapentin (NEURONTIN) 800 MG tablet; Take 1 tablet by mouth 3 times daily for 30 days. Dispense: 90 tablet; Refill: 0    3. COPD mixed type ([address]  Will send in tessalon for cough. Patient to call for new or worsening symptoms.     Patient counseled in length on smoking cessation including benefits of quitting and health risks of continuing to smoke including but not limited to lung cancer, COPD, risk of coronary artery disease. Patient verbalized understanding today  - benzonatate (TESSALON) 200 MG capsule; Take 1 capsule by mouth 3 times daily as needed for Cough  Dispense: 30 capsule; Refill: 0      No follow-ups on file. Cory Galicia is a 41 y.o. male being evaluated by a Virtual Visit (video visit) encounter to address concerns as mentioned above. A caregiver was present when appropriate. Due to this being a TeleHealth encounter (During BZYXW-76 public health emergency), evaluation of the following organ systems was limited: Vitals/Constitutional/EENT/Resp/CV/GI//MS/Neuro/Skin/Heme-Lymph-Imm. Pursuant to the emergency declaration under the [address] [place] [address], [address] [place] [address] authority and the [employer] Act, this Virtual Visit was conducted with patient's (and/or legal guardian's) consent, to reduce the patient's risk of exposure to COVID-19 and provide necessary medical care. The patient (and/or legal guardian) has also been advised to contact this office for worsening conditions or problems, and seek emergency medical treatment and/or call 911 if deemed necessary. Patient identification was verified at the start of the visit: Yes    Total time spent on this encounter: 15 minutes    Services were provided through a video synchronous discussion virtually to substitute for in-person clinic visit. Patient was located at their individual home and provider in the medical office. THIS VISIT WAS COMPLETED VIRTUALLY USING [employer]. ME    --MARAH Quispe - CNP on 12/1/2020 at 4:45 PM    An electronic signature was used to authenticate this note.

## 2020-12-03 ASSESSMENT — ENCOUNTER SYMPTOMS
COUGH: 1
WHEEZING: 0
CHEST TIGHTNESS: 0
EYES NEGATIVE: 1
SHORTNESS OF BREATH: 0

## 2020-12-28 RX ORDER — DULOXETIN HYDROCHLORIDE 60 MG/1
CAPSULE, DELAYED RELEASE ORAL
Qty: 30 CAPSULE | Refills: 0 | Status: SHIPPED | OUTPATIENT
Start: 2020-12-28 | End: 2020-12-31 | Stop reason: SDUPTHER

## 2020-12-29 ENCOUNTER — HOSPITAL ENCOUNTER (OUTPATIENT)
Dept: MRI IMAGING | Age: 40
Discharge: HOME | End: 2020-12-29
Payer: COMMERCIAL

## 2020-12-29 PROCEDURE — 72141 MRI NECK SPINE W/O DYE: CPT

## 2020-12-31 ENCOUNTER — OFFICE VISIT (OUTPATIENT)
Dept: FAMILY MEDICINE CLINIC | Age: 40
End: 2020-12-31
Payer: COMMERCIAL

## 2020-12-31 VITALS
SYSTOLIC BLOOD PRESSURE: 138 MMHG | OXYGEN SATURATION: 98 % | RESPIRATION RATE: 16 BRPM | HEART RATE: 93 BPM | DIASTOLIC BLOOD PRESSURE: 84 MMHG | BODY MASS INDEX: 36.13 KG/M2 | TEMPERATURE: 98.3 F | WEIGHT: 266.4 LBS

## 2020-12-31 DIAGNOSIS — R20.2 PARESTHESIA OF BOTH HANDS: ICD-10-CM

## 2020-12-31 DIAGNOSIS — I10 ESSENTIAL HYPERTENSION: ICD-10-CM

## 2020-12-31 DIAGNOSIS — J01.41 ACUTE RECURRENT PANSINUSITIS: ICD-10-CM

## 2020-12-31 DIAGNOSIS — F41.9 ANXIETY: ICD-10-CM

## 2020-12-31 DIAGNOSIS — M50.30 DEGENERATIVE DISC DISEASE, CERVICAL: ICD-10-CM

## 2020-12-31 DIAGNOSIS — M48.02 NEURAL FORAMINAL STENOSIS OF CERVICAL SPINE: Primary | ICD-10-CM

## 2020-12-31 DIAGNOSIS — M25.78 OSTEOPHYTE OF CERVICAL SPINE: ICD-10-CM

## 2020-12-31 PROCEDURE — G8484 FLU IMMUNIZE NO ADMIN: HCPCS | Performed by: NURSE PRACTITIONER

## 2020-12-31 PROCEDURE — 99214 OFFICE O/P EST MOD 30 MIN: CPT | Performed by: NURSE PRACTITIONER

## 2020-12-31 PROCEDURE — G8427 DOCREV CUR MEDS BY ELIG CLIN: HCPCS | Performed by: NURSE PRACTITIONER

## 2020-12-31 PROCEDURE — G8417 CALC BMI ABV UP PARAM F/U: HCPCS | Performed by: NURSE PRACTITIONER

## 2020-12-31 PROCEDURE — 4004F PT TOBACCO SCREEN RCVD TLK: CPT | Performed by: NURSE PRACTITIONER

## 2020-12-31 RX ORDER — NAPROXEN 500 MG/1
500 TABLET ORAL 2 TIMES DAILY WITH MEALS
Qty: 180 TABLET | Refills: 1 | Status: SHIPPED | OUTPATIENT
Start: 2020-12-31 | End: 2021-06-10

## 2020-12-31 RX ORDER — DULOXETIN HYDROCHLORIDE 60 MG/1
CAPSULE, DELAYED RELEASE ORAL
Qty: 30 CAPSULE | Refills: 2 | Status: SHIPPED | OUTPATIENT
Start: 2020-12-31 | End: 2021-02-09 | Stop reason: SDUPTHER

## 2020-12-31 RX ORDER — MONTELUKAST SODIUM 10 MG/1
TABLET ORAL
Qty: 90 TABLET | Refills: 0 | Status: SHIPPED | OUTPATIENT
Start: 2020-12-31

## 2020-12-31 RX ORDER — LOSARTAN POTASSIUM AND HYDROCHLOROTHIAZIDE 12.5; 5 MG/1; MG/1
TABLET ORAL
Qty: 90 TABLET | Refills: 5 | Status: SHIPPED | OUTPATIENT
Start: 2020-12-31

## 2020-12-31 RX ORDER — BUPROPION HYDROCHLORIDE 300 MG/1
300 TABLET ORAL EVERY MORNING
Qty: 90 TABLET | Refills: 0 | Status: SHIPPED | OUTPATIENT
Start: 2020-12-31 | End: 2021-06-22

## 2020-12-31 RX ORDER — GABAPENTIN 800 MG/1
800 TABLET ORAL 3 TIMES DAILY
Qty: 90 TABLET | Refills: 0 | Status: SHIPPED | OUTPATIENT
Start: 2020-12-31 | End: 2021-04-08 | Stop reason: SDUPTHER

## 2020-12-31 NOTE — PROGRESS NOTES
Subjective:      Chief Complaint   Patient presents with    1 Month Follow-Up     Pt here for 1 month follow up       HPI:  Ana Coppola is a 41 y.o. male who presents today for 1 month follow up. Anxiety  Patient is here for follow up of anxiety. He has the following anxiety symptoms which have improved since his last appointment: difficulty concentrating, fatigue, feelings of losing control, insomnia, irritable, paresthesias, and racing thoughts. He is currently on Wellbutrin  mg daily, Cymbalta 60 mg daily. He denies AVH/SIB/SI/HI.       Chronic Neck Pain   Pain is unchanged.  On average, pain is perceived as moderate (4-6 pain scale).  Change in quality of symptoms: no.  Associated symptoms: weakness, paresthesias and edema in bilateral hands. He denies: any other symptoms.  Current treatment: rest, ice or Ibuprofen. Recent diagnostic testing: x-ray of cervical spine completed 05/12/2020 showing mild degenerative changes with mild narrowing of the right C4/C5 neural foramen. He has an MRI, cervical spine on 12/29/2020 which showed spinal stenosis, disc protrusion C4-C5, and osteophytes. He was referred to Neurology on 08/11/2020 but has not scheduled an appointment at this time. Pain Management referral placed on 08/06/2020. He was seen and the plan was to start injections but he returned to work at [employer] and has not been able to schedule any appointments including PT. Will place a referral to Dr. Wallace Forrester per patient request.      Hypertension  Patient is here for follow-up of elevated blood pressure. He is not exercising and is adherent to a low-salt diet.  He is taking medication as prescribed. Helen Anderson does not check his BP at home.  Patient denies chest pain, chest pressure/discomfort, claudication, dyspnea, exertional chest pressure/discomfort, irregular heart beat, lower extremity edema, near-syncope, orthopnea, palpitations, paroxysmal nocturnal dyspnea and syncope.        Past Objective:      /84 (Site: Left Upper Arm, Position: Sitting, Cuff Size: Medium Adult)   Pulse 93   Temp 98.3 °F (36.8 °C) (Infrared)   Resp 16   Wt 266 lb 6.4 oz (120.8 kg)   SpO2 98%   BMI 36.13 kg/m²      Physical Exam  Vitals signs reviewed. Constitutional:       Appearance: Normal appearance. He is not ill-appearing. HENT:      Mouth/Throat:      Mouth: Mucous membranes are moist.      Pharynx: Oropharynx is clear. Eyes:      Conjunctiva/sclera: Conjunctivae normal.      Pupils: Pupils are equal, round, and reactive to light. Cardiovascular:      Rate and Rhythm: Normal rate and regular rhythm. Heart sounds: Normal heart sounds. Pulmonary:      Effort: Pulmonary effort is normal.      Breath sounds: Normal breath sounds. Abdominal:      General: Bowel sounds are normal.      Palpations: Abdomen is soft. Musculoskeletal:      Cervical back: He exhibits decreased range of motion, bony tenderness, swelling and pain. Skin:     General: Skin is warm and dry. Neurological:      Mental Status: He is alert and oriented to person, place, and time. Assessment / Plan:      1. Neural foraminal stenosis of cervical spine  Will start Naproxen BID and refer to spine specialist.   - naproxen (NAPROSYN) 500 MG tablet; Take 1 tablet by mouth 2 times daily (with meals)  Dispense: 180 tablet; Refill: 1  - External Referral To Orthopedic Surgery    2. Osteophyte of cervical spine  Will start Naproxen BID and refer to spine specialist.   - naproxen (NAPROSYN) 500 MG tablet; Take 1 tablet by mouth 2 times daily (with meals)  Dispense: 180 tablet; Refill: 1  - External Referral To Orthopedic Surgery    3. Degenerative disc disease, cervical  Will start Naproxen BID and refer to spine specialist.   - DULoxetine (CYMBALTA) 60 MG extended release capsule; TAKE 1 CAPSULE BY MOUTH EVERY DAY  Dispense: 30 capsule; Refill: 2  - gabapentin (NEURONTIN) 800 MG tablet;  Take 1 tablet by mouth 3 times daily for 30 days. Dispense: 90 tablet; Refill: 0    4. Paresthesia of both hands  Will start Naproxen BID and refer to spine specialist.   - gabapentin (NEURONTIN) 800 MG tablet; Take 1 tablet by mouth 3 times daily for 30 days. Dispense: 90 tablet; Refill: 0    5. Anxiety  Stable, continue current medication    - buPROPion (WELLBUTRIN XL) 300 MG extended release tablet; Take 1 tablet by mouth every morning  Dispense: 90 tablet; Refill: 0    6. Essential hypertension  BP controlled. Patient encouraged to monitor blood pressures. Goal is 130/80 or less. Bring your blood pressure recordings to your next appointment, or you can send them to us. Bring your home blood pressure machine with you to your next appointment. Exercise moderately, 30-45 minutes most days of the week. Lose weight if overweight. Increase your daily intake of grains, fresh fruits and vegetables. Reduce dietary sodium; less than 2.4 grams per day. If you smoke stop smoking. Limit alcohol intake. Reduce stress level  - losartan-hydroCHLOROthiazide (HYZAAR) 50-12.5 MG per tablet; TAKE 1 TABLET BY MOUTH EVERY DAY  Dispense: 90 tablet; Refill: 5    7. Acute recurrent pansinusitis  - montelukast (SINGULAIR) 10 MG tablet; TAKE 1 TABLET BY MOUTH EVERY DAY AT NIGHT  Dispense: 90 tablet;  Refill: 0          MARAH Abarca - CNP

## 2021-01-04 ASSESSMENT — ENCOUNTER SYMPTOMS
CHEST TIGHTNESS: 0
RESPIRATORY NEGATIVE: 1
WHEEZING: 0
SHORTNESS OF BREATH: 0
GASTROINTESTINAL NEGATIVE: 1
EYES NEGATIVE: 1

## 2021-02-01 RX ORDER — BUDESONIDE AND FORMOTEROL FUMARATE DIHYDRATE 160; 4.5 UG/1; UG/1
AEROSOL RESPIRATORY (INHALATION)
Qty: 30.6 INHALER | Refills: 1 | Status: SHIPPED | OUTPATIENT
Start: 2021-02-01 | End: 2021-08-02

## 2021-02-09 DIAGNOSIS — M50.30 DEGENERATIVE DISC DISEASE, CERVICAL: ICD-10-CM

## 2021-02-10 ENCOUNTER — TELEPHONE (OUTPATIENT)
Dept: FAMILY MEDICINE CLINIC | Age: 41
End: 2021-02-10

## 2021-02-10 DIAGNOSIS — M50.30 DEGENERATIVE DISC DISEASE, CERVICAL: Primary | ICD-10-CM

## 2021-02-10 DIAGNOSIS — M25.78 OSTEOPHYTE OF CERVICAL SPINE: ICD-10-CM

## 2021-02-10 DIAGNOSIS — R20.2 PARESTHESIA OF BOTH HANDS: ICD-10-CM

## 2021-02-10 DIAGNOSIS — M48.02 NEURAL FORAMINAL STENOSIS OF CERVICAL SPINE: ICD-10-CM

## 2021-02-13 NOTE — DISCHARGE SUMMARY
achieved by September 17, 2020:  Long term goal 1: Pt will demonstrate I with current HEP as prescribed in order to improve strength. not met  Long term goal 2: Pt will demonstrate R UE strength greater than or equal to 4+/5 in order to improve strength. not met  Long term goal 3: Pt will demonstrate a score indicating mild  disability on the NDI in order to improve quality of life.-not met         [x] Patient now discharged- has not scheduled further OP PT      Electronically signed by:  Kiko Wang, PT  2/13/2021, 11:37 AM    If you have any questions or concerns, please don't hesitate to call.   Thank you for your referral.

## 2021-02-15 RX ORDER — DULOXETIN HYDROCHLORIDE 60 MG/1
CAPSULE, DELAYED RELEASE ORAL
Qty: 30 CAPSULE | Refills: 2 | Status: SHIPPED | OUTPATIENT
Start: 2021-02-15 | End: 2021-04-08 | Stop reason: SDUPTHER

## 2021-04-08 ENCOUNTER — OFFICE VISIT (OUTPATIENT)
Dept: FAMILY MEDICINE CLINIC | Age: 41
End: 2021-04-08
Payer: COMMERCIAL

## 2021-04-08 VITALS
WEIGHT: 270.6 LBS | OXYGEN SATURATION: 97 % | RESPIRATION RATE: 16 BRPM | BODY MASS INDEX: 36.7 KG/M2 | HEART RATE: 81 BPM | SYSTOLIC BLOOD PRESSURE: 112 MMHG | TEMPERATURE: 98 F | DIASTOLIC BLOOD PRESSURE: 82 MMHG

## 2021-04-08 DIAGNOSIS — M50.30 DEGENERATIVE DISC DISEASE, CERVICAL: ICD-10-CM

## 2021-04-08 DIAGNOSIS — I10 ESSENTIAL HYPERTENSION: Primary | ICD-10-CM

## 2021-04-08 DIAGNOSIS — R20.2 PARESTHESIA OF BOTH HANDS: ICD-10-CM

## 2021-04-08 DIAGNOSIS — L97.521 SKIN ULCER OF TOE OF LEFT FOOT, LIMITED TO BREAKDOWN OF SKIN (HCC): ICD-10-CM

## 2021-04-08 PROBLEM — L97.522 SKIN ULCER OF TOE OF LEFT FOOT WITH FAT LAYER EXPOSED (HCC): Status: ACTIVE | Noted: 2021-04-08

## 2021-04-08 PROCEDURE — G8427 DOCREV CUR MEDS BY ELIG CLIN: HCPCS | Performed by: NURSE PRACTITIONER

## 2021-04-08 PROCEDURE — 99214 OFFICE O/P EST MOD 30 MIN: CPT | Performed by: NURSE PRACTITIONER

## 2021-04-08 PROCEDURE — 4004F PT TOBACCO SCREEN RCVD TLK: CPT | Performed by: NURSE PRACTITIONER

## 2021-04-08 PROCEDURE — G8417 CALC BMI ABV UP PARAM F/U: HCPCS | Performed by: NURSE PRACTITIONER

## 2021-04-08 RX ORDER — DULOXETIN HYDROCHLORIDE 60 MG/1
CAPSULE, DELAYED RELEASE ORAL
Qty: 30 CAPSULE | Refills: 2 | Status: SHIPPED | OUTPATIENT
Start: 2021-04-08

## 2021-04-08 RX ORDER — LORATADINE 10 MG/1
TABLET ORAL
COMMUNITY
Start: 2021-01-19

## 2021-04-08 RX ORDER — GABAPENTIN 800 MG/1
800 TABLET ORAL 3 TIMES DAILY
Qty: 90 TABLET | Refills: 0 | Status: SHIPPED | OUTPATIENT
Start: 2021-04-08 | End: 2021-06-10

## 2021-04-08 RX ORDER — HYDROCODONE BITARTRATE AND ACETAMINOPHEN 5; 325 MG/1; MG/1
TABLET ORAL 4 TIMES DAILY
COMMUNITY
Start: 2021-03-11

## 2021-04-08 RX ORDER — SULFAMETHOXAZOLE AND TRIMETHOPRIM 800; 160 MG/1; MG/1
1 TABLET ORAL 2 TIMES DAILY
COMMUNITY
Start: 2021-04-02 | End: 2021-04-12

## 2021-04-08 ASSESSMENT — PATIENT HEALTH QUESTIONNAIRE - PHQ9
1. LITTLE INTEREST OR PLEASURE IN DOING THINGS: 0
SUM OF ALL RESPONSES TO PHQ QUESTIONS 1-9: 0
SUM OF ALL RESPONSES TO PHQ9 QUESTIONS 1 & 2: 0

## 2021-04-08 NOTE — PROGRESS NOTES
Subjective:      Chief Complaint   Patient presents with    3 Month Follow-Up     Pt here for 3 month follow up    Gout     Pt thinks he has gout and states that he has pain in left foot that radiates to buttock area. HPI:  Belen Mercado is a 35 y.o. male who presents today for 3 month follow up. Anxiety  Patient is here for follow up of anxiety.  He has the following anxiety symptoms which have improved since his last appointment: difficulty concentrating, fatigue, feelings of losing control, insomnia, irritable, paresthesias, and racing thoughts. He is currently on Wellbutrin  mg daily, Cymbalta 60 mg daily. Lillie Nielsen denies AVH/SIB/SI/HI.       Chronic Neck Pain  Pain is unchanged.  On average, pain is perceived as moderate (4-6 pain scale). Change in quality of symptoms: no.  Associated symptoms: weakness, paresthesias and edema in bilateral hands. He denies: any other symptoms. Current treatment: rest, ice, Ibuprofen and gabapentin. Recent diagnostic testing: x-ray of cervical spine completed 05/12/2020 showing mild degenerative changes with mild narrowing of the right C4/C5 neural foramen. He has an MRI, cervical spine on 12/29/2020 which showed spinal stenosis, disc protrusion C4-C5, and osteophytes. He was seen by Dr. Priyanka Jacobson, Neurology who did not feel surgical intervention was indicated and suggested pain management. Patient is not interested in pain management at this time.       Hypertension  Patient is here for follow-up of elevated blood pressure. BP is well controlled today. He is not exercising and is adherent to a low-salt diet.  He is taking medication as prescribed. Lillie Nielsen does not check his BP at home.  Patient denies chest pain, chest pressure/discomfort, claudication, dyspnea, exertional chest pressure/discomfort, irregular heart beat, lower extremity edema, near-syncope, orthopnea, palpitations, paroxysmal nocturnal dyspnea and syncope.        Cellulitis of small left toe  He was seen at [facility] on 04/02/2021 and diagnosed with cellulitis of his small left toe. He was prescribed Bactrim DS x10 days and is still completing antibiotic. He notes increased pain, redness and states area is now ulcerated. He denies drainage. Pain is radiating up his leg and into his buttocks. He is not diabetic but will repeat labs. Past Medical History:   Diagnosis Date    Abnormal liver function tests     scheduled for liver bx 10/4/2013    Anxiety     Asthma     last flare up winter 2019    Chronic abdominal pain     COPD (chronic obstructive pulmonary disease) (HCC)     Depression     FAP (familial adenomatous polyposis)     family hx of this disorder per h&p    Hepatitis C 2006    \"took medication for this and no longer shows up in my blood\"    History of heroin use 07/16/2018    In the ER February 2015 with acute heroin overdose    History of kidney stones 03/27/2018    \"have stone now\"7/15/2020 follow with Dr Devonte Wood Hypertension     Sleep apnea     sleep study 2017- had cpap    Wears glasses     to read        Social History     Tobacco Use    Smoking status: Current Every Day Smoker     Packs/day: 1.00     Years: 18.00     Pack years: 18.00     Types: Cigarettes    Smokeless tobacco: Never Used   Substance Use Topics    Alcohol use: Not Currently     Comment: has not drank for over a year/ per pt on 7/15/2020\"drink one time per month-\"        Review of Systems   Constitutional: Negative for activity change, appetite change, chills, fever and unexpected weight change. HENT: Negative. Eyes: Negative. Negative for visual disturbance. Respiratory: Negative. Negative for chest tightness, shortness of breath and wheezing. Cardiovascular: Negative. Negative for chest pain and palpitations. Gastrointestinal: Negative. Endocrine: Negative. Genitourinary: Negative.     Musculoskeletal: Positive for arthralgias, joint swelling, myalgias, neck pain and neck stiffness. Skin: Positive for wound. Neurological: Positive for weakness and numbness. Negative for dizziness and headaches. Psychiatric/Behavioral: Positive for decreased concentration and dysphoric mood. Negative for sleep disturbance and suicidal ideas. The patient is nervous/anxious. The patient is not hyperactive. Symptoms are stable           Objective:      /82 (Site: Right Upper Arm, Position: Sitting, Cuff Size: Medium Adult)   Pulse 81   Temp 98 °F (36.7 °C) (Infrared)   Resp 16   Wt 270 lb 9.6 oz (122.7 kg)   SpO2 97%   BMI 36.70 kg/m²      Physical Exam  Vitals signs reviewed. Constitutional:       Appearance: Normal appearance. He is not ill-appearing. HENT:      Mouth/Throat:      Mouth: Mucous membranes are moist.      Pharynx: Oropharynx is clear. Eyes:      Conjunctiva/sclera: Conjunctivae normal.      Pupils: Pupils are equal, round, and reactive to light. Cardiovascular:      Rate and Rhythm: Normal rate and regular rhythm. Heart sounds: Normal heart sounds. Pulmonary:      Effort: Pulmonary effort is normal.      Breath sounds: Normal breath sounds. Abdominal:      General: Bowel sounds are normal.      Palpations: Abdomen is soft. Musculoskeletal:      Cervical back: He exhibits decreased range of motion, bony tenderness, swelling and pain. Skin:     General: Skin is warm and dry. Capillary Refill: Capillary refill takes less than 2 seconds. Findings: Lesion present. Comments: Skin breakdown on the medial aspect of the left 5th digit of the left foot. No drainage noted. No increased area of redness or warmth observed. Neurological:      Mental Status: He is alert and oriented to person, place, and time. Deep Tendon Reflexes: Reflexes normal.   Psychiatric:         Mood and Affect: Mood normal.         Behavior: Behavior normal.         Thought Content:  Thought content normal.         Judgment: Judgment normal. Assessment / Plan:      1. Essential hypertension  Blood pressure is stable. Continue current medications. Will check labs today. - Comprehensive Metabolic Panel, Fasting; Future  - Lipid, Fasting; Future  - CBC Auto Differential; Future    2. Degenerative disc disease, cervical  OARRS reviewed; no concerning behaviors. - gabapentin (NEURONTIN) 800 MG tablet; Take 1 tablet by mouth 3 times daily for 30 days. Dispense: 90 tablet; Refill: 0  - DULoxetine (CYMBALTA) 60 MG extended release capsule; TAKE 1 CAPSULE BY MOUTH EVERY DAY  Dispense: 30 capsule; Refill: 2    3. Paresthesia of both hands  OARRS reviewed; no concerning behaviors. - gabapentin (NEURONTIN) 800 MG tablet; Take 1 tablet by mouth 3 times daily for 30 days. Dispense: 90 tablet; Refill: 0    4. Skin ulcer of toe of left foot, limited to breakdown of skin [facility])  Referral placed to Dr. Archana Ballard, Podiatry per patient request.  Continue Bactrim DS as previously prescribed. - External Referral To Podiatry    Controlled Substance Monitoring:    Acute and Chronic Pain Monitoring:   RX Monitoring 4/8/2021   Attestation -   Periodic Controlled Substance Monitoring Possible medication side effects, risk of tolerance/dependence & alternative treatments discussed. ;No signs of potential drug abuse or diversion identified. ;Assessed functional status. ;Obtaining appropriate analgesic effect of treatment.             MARAH Torres - CNP

## 2021-04-09 ASSESSMENT — ENCOUNTER SYMPTOMS
RESPIRATORY NEGATIVE: 1
CHEST TIGHTNESS: 0
GASTROINTESTINAL NEGATIVE: 1
WHEEZING: 0
EYES NEGATIVE: 1
SHORTNESS OF BREATH: 0

## 2021-04-10 ENCOUNTER — HOSPITAL ENCOUNTER (OUTPATIENT)
Age: 41
Discharge: HOME | End: 2021-04-10
Payer: COMMERCIAL

## 2021-04-10 LAB
ALBUMIN SERPL-MCNC: 3.8 GM/DL (ref 3.4–5)
ALP BLD-CCNC: 97 IU/L (ref 40–129)
ALT SERPL-CCNC: 33 U/L (ref 10–40)
ANION GAP SERPL CALCULATED.3IONS-SCNC: 10 MMOL/L (ref 4–16)
AST SERPL-CCNC: 32 IU/L (ref 15–37)
BASOPHILS ABSOLUTE: 0 K/CU MM
BASOPHILS RELATIVE PERCENT: 0.2 % (ref 0–1)
BILIRUB SERPL-MCNC: 0.2 MG/DL (ref 0–1)
BUN BLDV-MCNC: 16 MG/DL (ref 6–23)
CALCIUM SERPL-MCNC: 9.2 MG/DL (ref 8.3–10.6)
CHLORIDE BLD-SCNC: 106 MMOL/L (ref 99–110)
CHOLESTEROL, FASTING: 112 MG/DL
CO2: 23 MMOL/L (ref 21–32)
CREAT SERPL-MCNC: 0.9 MG/DL (ref 0.9–1.3)
DIFFERENTIAL TYPE: ABNORMAL
EOSINOPHILS ABSOLUTE: 0.2 K/CU MM
EOSINOPHILS RELATIVE PERCENT: 1.6 % (ref 0–3)
GFR AFRICAN AMERICAN: >60 ML/MIN/1.73M2
GFR NON-AFRICAN AMERICAN: >60 ML/MIN/1.73M2
GLUCOSE BLD-MCNC: 98 MG/DL (ref 70–99)
HCT VFR BLD CALC: 49.9 % (ref 42–52)
HDLC SERPL-MCNC: 29 MG/DL
HEMOGLOBIN: 16.3 GM/DL (ref 13.5–18)
IMMATURE NEUTROPHIL %: 0.5 % (ref 0–0.43)
LDL CHOLESTEROL DIRECT: 71 MG/DL
LYMPHOCYTES ABSOLUTE: 5.1 K/CU MM
LYMPHOCYTES RELATIVE PERCENT: 42.2 % (ref 24–44)
MCH RBC QN AUTO: 28.8 PG (ref 27–31)
MCHC RBC AUTO-ENTMCNC: 32.7 % (ref 32–36)
MCV RBC AUTO: 88.2 FL (ref 78–100)
MONOCYTES ABSOLUTE: 0.8 K/CU MM
MONOCYTES RELATIVE PERCENT: 6.8 % (ref 0–4)
PDW BLD-RTO: 14.6 % (ref 11.7–14.9)
PLATELET # BLD: 271 K/CU MM (ref 140–440)
PMV BLD AUTO: 9.2 FL (ref 7.5–11.1)
POTASSIUM SERPL-SCNC: 4.4 MMOL/L (ref 3.5–5.1)
RBC # BLD: 5.66 M/CU MM (ref 4.6–6.2)
SEGMENTED NEUTROPHILS ABSOLUTE COUNT: 5.9 K/CU MM
SEGMENTED NEUTROPHILS RELATIVE PERCENT: 48.7 % (ref 36–66)
SODIUM BLD-SCNC: 139 MMOL/L (ref 135–145)
TOTAL IMMATURE NEUTOROPHIL: 0.06 K/CU MM
TOTAL PROTEIN: 6.6 GM/DL (ref 6.4–8.2)
TRIGLYCERIDE, FASTING: 104 MG/DL
WBC # BLD: 12 K/CU MM (ref 4–10.5)

## 2021-04-10 PROCEDURE — 80053 COMPREHEN METABOLIC PANEL: CPT

## 2021-04-10 PROCEDURE — 36415 COLL VENOUS BLD VENIPUNCTURE: CPT

## 2021-04-10 PROCEDURE — 85025 COMPLETE CBC W/AUTO DIFF WBC: CPT

## 2021-04-10 PROCEDURE — 80061 LIPID PANEL: CPT

## 2021-06-10 DIAGNOSIS — M48.02 NEURAL FORAMINAL STENOSIS OF CERVICAL SPINE: ICD-10-CM

## 2021-06-10 DIAGNOSIS — M25.78 OSTEOPHYTE OF CERVICAL SPINE: ICD-10-CM

## 2021-06-10 RX ORDER — NAPROXEN 500 MG/1
TABLET ORAL
Qty: 180 TABLET | Refills: 1 | Status: SHIPPED | OUTPATIENT
Start: 2021-06-10

## 2021-06-22 DIAGNOSIS — F41.9 ANXIETY: ICD-10-CM

## 2021-06-22 RX ORDER — BUPROPION HYDROCHLORIDE 300 MG/1
TABLET ORAL
Qty: 90 TABLET | Refills: 0 | Status: SHIPPED | OUTPATIENT
Start: 2021-06-22

## 2021-06-24 ENCOUNTER — OFFICE VISIT (OUTPATIENT)
Dept: FAMILY MEDICINE CLINIC | Age: 41
End: 2021-06-24
Payer: COMMERCIAL

## 2021-06-24 VITALS
SYSTOLIC BLOOD PRESSURE: 132 MMHG | BODY MASS INDEX: 36.4 KG/M2 | HEART RATE: 85 BPM | RESPIRATION RATE: 15 BRPM | TEMPERATURE: 97.4 F | WEIGHT: 268.4 LBS | DIASTOLIC BLOOD PRESSURE: 84 MMHG | OXYGEN SATURATION: 96 %

## 2021-06-24 DIAGNOSIS — H66.001 NON-RECURRENT ACUTE SUPPURATIVE OTITIS MEDIA OF RIGHT EAR WITHOUT SPONTANEOUS RUPTURE OF TYMPANIC MEMBRANE: Primary | ICD-10-CM

## 2021-06-24 DIAGNOSIS — F17.200 TOBACCO USE DISORDER: ICD-10-CM

## 2021-06-24 PROCEDURE — 99213 OFFICE O/P EST LOW 20 MIN: CPT | Performed by: NURSE PRACTITIONER

## 2021-06-24 PROCEDURE — G8417 CALC BMI ABV UP PARAM F/U: HCPCS | Performed by: NURSE PRACTITIONER

## 2021-06-24 PROCEDURE — 4004F PT TOBACCO SCREEN RCVD TLK: CPT | Performed by: NURSE PRACTITIONER

## 2021-06-24 PROCEDURE — G8427 DOCREV CUR MEDS BY ELIG CLIN: HCPCS | Performed by: NURSE PRACTITIONER

## 2021-06-24 RX ORDER — NICOTINE 21 MG/24HR
1 PATCH, TRANSDERMAL 24 HOURS TRANSDERMAL DAILY
Qty: 42 PATCH | Refills: 0 | Status: SHIPPED | OUTPATIENT
Start: 2021-06-24 | End: 2021-08-05

## 2021-06-24 RX ORDER — AZITHROMYCIN 250 MG/1
250 TABLET, FILM COATED ORAL SEE ADMIN INSTRUCTIONS
Qty: 6 TABLET | Refills: 0 | Status: SHIPPED | OUTPATIENT
Start: 2021-06-24 | End: 2021-06-29

## 2021-06-24 RX ORDER — CIPROFLOXACIN AND DEXAMETHASONE 3; 1 MG/ML; MG/ML
4 SUSPENSION/ DROPS AURICULAR (OTIC) 2 TIMES DAILY
Qty: 1 BOTTLE | Refills: 0 | Status: SHIPPED | OUTPATIENT
Start: 2021-06-24 | End: 2021-07-01

## 2021-06-24 SDOH — ECONOMIC STABILITY: FOOD INSECURITY: WITHIN THE PAST 12 MONTHS, THE FOOD YOU BOUGHT JUST DIDN'T LAST AND YOU DIDN'T HAVE MONEY TO GET MORE.: OFTEN TRUE

## 2021-06-24 SDOH — ECONOMIC STABILITY: FOOD INSECURITY: WITHIN THE PAST 12 MONTHS, YOU WORRIED THAT YOUR FOOD WOULD RUN OUT BEFORE YOU GOT MONEY TO BUY MORE.: OFTEN TRUE

## 2021-06-24 ASSESSMENT — PATIENT HEALTH QUESTIONNAIRE - PHQ9
5. POOR APPETITE OR OVEREATING: 0
SUM OF ALL RESPONSES TO PHQ QUESTIONS 1-9: 18
1. LITTLE INTEREST OR PLEASURE IN DOING THINGS: 3
2. FEELING DOWN, DEPRESSED OR HOPELESS: 1
8. MOVING OR SPEAKING SO SLOWLY THAT OTHER PEOPLE COULD HAVE NOTICED. OR THE OPPOSITE, BEING SO FIGETY OR RESTLESS THAT YOU HAVE BEEN MOVING AROUND A LOT MORE THAN USUAL: 3
SUM OF ALL RESPONSES TO PHQ9 QUESTIONS 1 & 2: 4
3. TROUBLE FALLING OR STAYING ASLEEP: 3
7. TROUBLE CONCENTRATING ON THINGS, SUCH AS READING THE NEWSPAPER OR WATCHING TELEVISION: 3
10. IF YOU CHECKED OFF ANY PROBLEMS, HOW DIFFICULT HAVE THESE PROBLEMS MADE IT FOR YOU TO DO YOUR WORK, TAKE CARE OF THINGS AT HOME, OR GET ALONG WITH OTHER PEOPLE: 1
SUM OF ALL RESPONSES TO PHQ QUESTIONS 1-9: 18
SUM OF ALL RESPONSES TO PHQ QUESTIONS 1-9: 17
9. THOUGHTS THAT YOU WOULD BE BETTER OFF DEAD, OR OF HURTING YOURSELF: 1
6. FEELING BAD ABOUT YOURSELF - OR THAT YOU ARE A FAILURE OR HAVE LET YOURSELF OR YOUR FAMILY DOWN: 1
4. FEELING TIRED OR HAVING LITTLE ENERGY: 3

## 2021-06-24 ASSESSMENT — COLUMBIA-SUICIDE SEVERITY RATING SCALE - C-SSRS
7. DID THIS OCCUR IN THE LAST THREE MONTHS: NO
5. HAVE YOU STARTED TO WORK OUT OR WORKED OUT THE DETAILS OF HOW TO KILL YOURSELF? DO YOU INTEND TO CARRY OUT THIS PLAN?: NO
1. WITHIN THE PAST MONTH, HAVE YOU WISHED YOU WERE DEAD OR WISHED YOU COULD GO TO SLEEP AND NOT WAKE UP?: YES
3. HAVE YOU BEEN THINKING ABOUT HOW YOU MIGHT KILL YOURSELF?: NO
6. HAVE YOU EVER DONE ANYTHING, STARTED TO DO ANYTHING, OR PREPARED TO DO ANYTHING TO END YOUR LIFE?: NO
4. HAVE YOU HAD THESE THOUGHTS AND HAD SOME INTENTION OF ACTING ON THEM?: NO
2. HAVE YOU ACTUALLY HAD ANY THOUGHTS OF KILLING YOURSELF?: NO

## 2021-06-24 ASSESSMENT — SOCIAL DETERMINANTS OF HEALTH (SDOH): HOW HARD IS IT FOR YOU TO PAY FOR THE VERY BASICS LIKE FOOD, HOUSING, MEDICAL CARE, AND HEATING?: NOT HARD AT ALL

## 2021-06-24 NOTE — PROGRESS NOTES
ciprSubjective:      Chief Complaint   Patient presents with    Otalgia     Right ear and jaw pain with sinus drainage since Friday.  Nicotine Dependence     Would like to quit. HPI:  Dalphine Alpers is a 35 y.o. male who presents today for the following:     Upper Respiratory Infection  Patient complains of a 6 day history of the left ear pain, sinus pain/pressure, postnasal drainage,  sore throat and swollen glands. Onset of symptoms was 6 days ago, gradually worsening since that time. He denies fever, chills, congestion, cough, shortness of breath, wheezing, abdominal pain, nausea, vomiting or diarrhea. Tobacco Use Disorder  He began smoking 18 years ago. He currently smokes 1 packs per day. He has attempted to quit smoking in the past, but was not successful. He would like to try nicotine patches to quit.       Past Medical History:   Diagnosis Date    Abnormal liver function tests     scheduled for liver bx 10/4/2013    Anxiety     Asthma     last flare up winter 2019    Chronic abdominal pain     COPD (chronic obstructive pulmonary disease) (HCC)     Depression     FAP (familial adenomatous polyposis)     family hx of this disorder per h&p    Hepatitis C 2006    \"took medication for this and no longer shows up in my blood\"    History of heroin use 07/16/2018    In the ER February 2015 with acute heroin overdose    History of kidney stones 03/27/2018    \"have stone now\"7/15/2020 follow with Dr Srinivas Turner Hypertension     Sleep apnea     sleep study 2017- had cpap    Wears glasses     to read        Social History     Tobacco Use    Smoking status: Current Every Day Smoker     Packs/day: 1.00     Years: 18.00     Pack years: 18.00     Types: Cigarettes    Smokeless tobacco: Never Used   Substance Use Topics    Alcohol use: Not Currently     Comment: has not drank for over a year/ per pt on 7/15/2020\"drink one time per month-\"        Review of Systems   Constitutional: Positive for fatigue. Negative for chills and fever. HENT: Positive for ear pain, postnasal drip, sinus pressure, sinus pain and sore throat. Negative for congestion, ear discharge (left), hearing loss, mouth sores, rhinorrhea, sneezing, trouble swallowing and voice change. Eyes: Negative for pain, discharge, redness and itching. Respiratory: Negative for cough, chest tightness, shortness of breath and wheezing. Cardiovascular: Negative. Negative for chest pain and palpitations. Gastrointestinal: Negative for abdominal pain, diarrhea, nausea and vomiting. Musculoskeletal: Negative for myalgias and neck stiffness. Skin: Negative for pallor. Allergic/Immunologic: Negative for environmental allergies. Neurological: Negative for dizziness and headaches. Hematological: Positive for adenopathy. Objective:      /84 (Site: Right Upper Arm, Position: Sitting, Cuff Size: Medium Adult)   Pulse 85   Temp 97.4 °F (36.3 °C) (Temporal)   Resp 15   Wt 268 lb 6.4 oz (121.7 kg)   SpO2 96%   BMI 36.40 kg/m²      Physical Exam  Vitals reviewed. Constitutional:       Appearance: Normal appearance. He is well-developed. He is not ill-appearing. HENT:      Head: Normocephalic and atraumatic. Right Ear: Hearing, tympanic membrane, ear canal and external ear normal.      Left Ear: Hearing normal. Swelling and tenderness present. Tympanic membrane is erythematous. Nose: Mucosal edema and rhinorrhea present. No nasal deformity or laceration. Right Sinus: Frontal sinus tenderness present. No maxillary sinus tenderness. Left Sinus: Frontal sinus tenderness present. No maxillary sinus tenderness. Mouth/Throat:      Mouth: No oral lesions. Pharynx: Uvula midline. No oropharyngeal exudate or posterior oropharyngeal erythema.       Comments: Clear postnasal drainage noted  Eyes:      General: Lids are normal.      Conjunctiva/sclera: Conjunctivae normal.      Pupils: Pupils are equal, round, and reactive to light. Neck:      Trachea: Trachea normal.   Cardiovascular:      Rate and Rhythm: Normal rate and regular rhythm. Pulmonary:      Effort: Pulmonary effort is normal.      Breath sounds: Normal breath sounds. Abdominal:      General: Bowel sounds are normal.      Palpations: Abdomen is soft. Musculoskeletal:         General: Normal range of motion. Cervical back: Neck supple. Right lower leg: No edema. Left lower leg: No edema. Lymphadenopathy:      Head:      Left side of head: Submandibular adenopathy present. Cervical: Cervical adenopathy present. Right cervical: No superficial, deep or posterior cervical adenopathy. Left cervical: Superficial cervical adenopathy present. No deep or posterior cervical adenopathy. Upper Body:      Right upper body: No supraclavicular adenopathy. Left upper body: No supraclavicular adenopathy. Skin:     General: Skin is warm. Findings: No bruising or rash. Neurological:      Mental Status: He is alert and oriented to person, place, and time. Psychiatric:         Behavior: Behavior is cooperative. Assessment / Plan:      1. Non-recurrent acute suppurative otitis media of right ear without spontaneous rupture of tympanic membrane  Return for new or worsening symptoms. - azithromycin (ZITHROMAX) 250 MG tablet; Take 1 tablet by mouth See Admin Instructions for 5 days 500mg on day 1 followed by 250mg on days 2 - 5  Dispense: 6 tablet; Refill: 0    2. Tobacco use disorder  Patient counseled in length on smoking cessation including benefits of quitting and health risks of continuing to smoke including but not limited to lung cancer, COPD, risk of coronary artery disease. Patient verbalized understanding today  - nicotine (NICODERM CQ) 21 MG/24HR; Place 1 patch onto the skin daily  Dispense: 42 patch;  Refill: 0          MARAH Caruso - CNP

## 2021-06-25 ASSESSMENT — ENCOUNTER SYMPTOMS
ABDOMINAL PAIN: 0
EYE PAIN: 0
VOICE CHANGE: 0
EYE ITCHING: 0
EYE REDNESS: 0
SINUS PRESSURE: 1
NAUSEA: 0
COUGH: 0
SORE THROAT: 1
DIARRHEA: 0
RHINORRHEA: 0
SHORTNESS OF BREATH: 0
EYE DISCHARGE: 0
TROUBLE SWALLOWING: 0
SINUS PAIN: 1
VOMITING: 0
WHEEZING: 0
CHEST TIGHTNESS: 0

## 2021-07-19 ENCOUNTER — OFFICE VISIT (OUTPATIENT)
Dept: FAMILY MEDICINE CLINIC | Age: 41
End: 2021-07-19
Payer: COMMERCIAL

## 2021-07-19 VITALS
DIASTOLIC BLOOD PRESSURE: 80 MMHG | BODY MASS INDEX: 36.32 KG/M2 | SYSTOLIC BLOOD PRESSURE: 138 MMHG | RESPIRATION RATE: 14 BRPM | HEART RATE: 93 BPM | OXYGEN SATURATION: 96 % | TEMPERATURE: 98.7 F | WEIGHT: 267.8 LBS

## 2021-07-19 DIAGNOSIS — M50.30 DEGENERATIVE DISC DISEASE, CERVICAL: ICD-10-CM

## 2021-07-19 DIAGNOSIS — R20.2 PARESTHESIA OF BOTH HANDS: ICD-10-CM

## 2021-07-19 DIAGNOSIS — F33.1 MODERATE EPISODE OF RECURRENT MAJOR DEPRESSIVE DISORDER (HCC): ICD-10-CM

## 2021-07-19 DIAGNOSIS — F41.9 ANXIETY: Primary | ICD-10-CM

## 2021-07-19 PROCEDURE — G8427 DOCREV CUR MEDS BY ELIG CLIN: HCPCS | Performed by: NURSE PRACTITIONER

## 2021-07-19 PROCEDURE — 99214 OFFICE O/P EST MOD 30 MIN: CPT | Performed by: NURSE PRACTITIONER

## 2021-07-19 PROCEDURE — G8417 CALC BMI ABV UP PARAM F/U: HCPCS | Performed by: NURSE PRACTITIONER

## 2021-07-19 PROCEDURE — 4004F PT TOBACCO SCREEN RCVD TLK: CPT | Performed by: NURSE PRACTITIONER

## 2021-07-19 RX ORDER — TIZANIDINE 4 MG/1
4 TABLET ORAL 3 TIMES DAILY PRN
Qty: 30 TABLET | Refills: 0 | Status: SHIPPED | OUTPATIENT
Start: 2021-07-19 | End: 2021-07-29

## 2021-07-19 RX ORDER — KETOCONAZOLE 20 MG/G
CREAM TOPICAL
COMMUNITY
Start: 2021-05-20

## 2021-07-19 RX ORDER — GABAPENTIN 800 MG/1
800 TABLET ORAL 3 TIMES DAILY
Qty: 90 TABLET | Refills: 2 | Status: SHIPPED | OUTPATIENT
Start: 2021-07-19 | End: 2021-08-18

## 2021-07-19 ASSESSMENT — ANXIETY QUESTIONNAIRES
GAD7 TOTAL SCORE: 9
2. NOT BEING ABLE TO STOP OR CONTROL WORRYING: 1
3. WORRYING TOO MUCH ABOUT DIFFERENT THINGS: 1
1. FEELING NERVOUS, ANXIOUS, OR ON EDGE: 0
IF YOU CHECKED OFF ANY PROBLEMS ON THIS QUESTIONNAIRE, HOW DIFFICULT HAVE THESE PROBLEMS MADE IT FOR YOU TO DO YOUR WORK, TAKE CARE OF THINGS AT HOME, OR GET ALONG WITH OTHER PEOPLE: SOMEWHAT DIFFICULT
7. FEELING AFRAID AS IF SOMETHING AWFUL MIGHT HAPPEN: 1
5. BEING SO RESTLESS THAT IT IS HARD TO SIT STILL: 1
4. TROUBLE RELAXING: 3
6. BECOMING EASILY ANNOYED OR IRRITABLE: 2

## 2021-07-19 ASSESSMENT — ENCOUNTER SYMPTOMS
EYES NEGATIVE: 1
WHEEZING: 0
RESPIRATORY NEGATIVE: 1
CHEST TIGHTNESS: 0
SHORTNESS OF BREATH: 0
COLOR CHANGE: 0
GASTROINTESTINAL NEGATIVE: 1

## 2021-07-19 ASSESSMENT — COLUMBIA-SUICIDE SEVERITY RATING SCALE - C-SSRS
1. WITHIN THE PAST MONTH, HAVE YOU WISHED YOU WERE DEAD OR WISHED YOU COULD GO TO SLEEP AND NOT WAKE UP?: NO
6. HAVE YOU EVER DONE ANYTHING, STARTED TO DO ANYTHING, OR PREPARED TO DO ANYTHING TO END YOUR LIFE?: NO
2. HAVE YOU ACTUALLY HAD ANY THOUGHTS OF KILLING YOURSELF?: NO

## 2021-07-19 ASSESSMENT — PATIENT HEALTH QUESTIONNAIRE - PHQ9
9. THOUGHTS THAT YOU WOULD BE BETTER OFF DEAD, OR OF HURTING YOURSELF: 0
SUM OF ALL RESPONSES TO PHQ QUESTIONS 1-9: 8
6. FEELING BAD ABOUT YOURSELF - OR THAT YOU ARE A FAILURE OR HAVE LET YOURSELF OR YOUR FAMILY DOWN: 0
10. IF YOU CHECKED OFF ANY PROBLEMS, HOW DIFFICULT HAVE THESE PROBLEMS MADE IT FOR YOU TO DO YOUR WORK, TAKE CARE OF THINGS AT HOME, OR GET ALONG WITH OTHER PEOPLE: 0
3. TROUBLE FALLING OR STAYING ASLEEP: 2
4. FEELING TIRED OR HAVING LITTLE ENERGY: 1
2. FEELING DOWN, DEPRESSED OR HOPELESS: 1
SUM OF ALL RESPONSES TO PHQ QUESTIONS 1-9: 8
7. TROUBLE CONCENTRATING ON THINGS, SUCH AS READING THE NEWSPAPER OR WATCHING TELEVISION: 1
5. POOR APPETITE OR OVEREATING: 0
SUM OF ALL RESPONSES TO PHQ9 QUESTIONS 1 & 2: 3
8. MOVING OR SPEAKING SO SLOWLY THAT OTHER PEOPLE COULD HAVE NOTICED. OR THE OPPOSITE, BEING SO FIGETY OR RESTLESS THAT YOU HAVE BEEN MOVING AROUND A LOT MORE THAN USUAL: 1
SUM OF ALL RESPONSES TO PHQ QUESTIONS 1-9: 8
1. LITTLE INTEREST OR PLEASURE IN DOING THINGS: 2

## 2021-07-19 NOTE — PROGRESS NOTES
Subjective:      Chief Complaint   Patient presents with    Follow-up     Patient presents today for a 3 month follow up. HPI:  Uzair Olmstead is a 35 y.o. male who presents today for 3 month follow up for the following chronic medical conditions:     Anxiety  Patient is here for follow up of anxiety.  He has the following anxiety symptoms which have continued to improve: difficulty concentrating, fatigue, feelings of losing control, insomnia, irritable, paresthesias, and racing thoughts. He is currently on Wellbutrin  mg daily, Cymbalta 60 mg daily.  He denies AVH/SIB/SI/HI.       Chronic Neck Pain  Pain is unchanged.  On average, pain is perceived as moderate (4-6 pain scale). Change in quality of symptoms: no.  Associated symptoms: muscle spasms,  weakness, paresthesias and edema in bilateral hands. He denies: any other symptoms. Current treatment: rest, ice, Ibuprofen and gabapentin. Recent diagnostic testing: x-ray of cervical spine completed 05/12/2020 showing mild degenerative changes with mild narrowing of the right C4/C5 neural foramen. He has an MRI, cervical spine on 12/29/2020 which showed spinal stenosis, disc protrusion C4-C5, and osteophytes.  He was seen by Dr. Salome Santos, Neurology who did not feel surgical intervention was indicated and suggested pain management. Patient is currently on Gabapentin 800 mg TID and Norco 5-325 mg QID (per pain management) . Gabapentin last filled 06/10/2021. He is going to have injections through pain management and will need FMLA paperwork completed. Injection is scheduled for 08/17. He follows with them 1-2 days a month for the days he will need to take off of work to have injections completed; he states pain management does not fill out FMLA.       Hypertension  Patient is here for follow-up of elevated blood pressure. He is taking Losartan-HCTZ 50-1.5 mg daily. BP is well controlled today.   He is not exercising and is adherent to a low-salt diet. Kareem Gresham is taking medication as prescribed. Kareem Gresham does not check his BP at home. Patient denies chest pain, chest pressure/discomfort, claudication, dyspnea, exertional chest pressure/discomfort, irregular heart beat, lower extremity edema, near-syncope, orthopnea, palpitations, paroxysmal nocturnal dyspnea and syncope.     Past Medical History:   Diagnosis Date    Abnormal liver function tests     scheduled for liver bx 10/4/2013    Anxiety     Asthma     last flare up winter 2019    Chronic abdominal pain     COPD (chronic obstructive pulmonary disease) ([facility] [address]     Depression     FAP (familial adenomatous polyposis)     family hx of this disorder per h&p    Hepatitis C 2006    \"took medication for this and no longer shows up in my blood\"    History of heroin use 07/16/2018    In the ER February 2015 with acute heroin overdose    History of kidney stones 03/27/2018    \"have stone now\"7/15/2020 follow with Dr Michael Duggan Hypertension     Sleep apnea     sleep study 2017- had cpap    Wears glasses     to read        Social History     Tobacco Use    Smoking status: Current Every Day Smoker     Packs/day: 1.00     Years: 18.00     Pack years: 18.00     Types: Cigarettes    Smokeless tobacco: Never Used   Substance Use Topics    Alcohol use: Not Currently     Comment: has not drank for over a year/ per pt on 7/15/2020\"drink one time per month-\"        Review of Systems   Constitutional: Negative for activity change, appetite change, chills, fever and unexpected weight change. HENT: Negative. Eyes: Negative. Negative for visual disturbance. Respiratory: Negative. Negative for chest tightness, shortness of breath and wheezing. Cardiovascular: Negative. Negative for chest pain and palpitations. Gastrointestinal: Negative. Endocrine: Negative. Genitourinary: Negative. Musculoskeletal: Positive for arthralgias, joint swelling, myalgias, neck pain and neck stiffness.    Skin: Negative for color change, pallor, rash and wound. Neurological: Positive for weakness and numbness. Negative for dizziness and headaches. Psychiatric/Behavioral: Positive for decreased concentration and dysphoric mood. Negative for sleep disturbance and suicidal ideas. The patient is nervous/anxious. The patient is not hyperactive. Symptoms are stable           Objective:      /80   Pulse 93   Temp 98.7 °F (37.1 °C) (Temporal)   Resp 14   Wt 267 lb 12.8 oz (121.5 kg)   SpO2 96%   BMI 36.32 kg/m²      Physical Exam  Vitals reviewed. Constitutional:       Appearance: Normal appearance. He is not ill-appearing. HENT:      Mouth/Throat:      Mouth: Mucous membranes are moist.      Pharynx: Oropharynx is clear. Eyes:      Conjunctiva/sclera: Conjunctivae normal.      Pupils: Pupils are equal, round, and reactive to light. Cardiovascular:      Rate and Rhythm: Normal rate and regular rhythm. Heart sounds: Normal heart sounds. Pulmonary:      Effort: Pulmonary effort is normal.      Breath sounds: Normal breath sounds. Abdominal:      General: Bowel sounds are normal.      Palpations: Abdomen is soft. Musculoskeletal:      Cervical back: Swelling and bony tenderness present. Skin:     General: Skin is warm and dry. Capillary Refill: Capillary refill takes less than 2 seconds. Findings: No erythema, lesion or rash. Neurological:      Mental Status: He is alert and oriented to person, place, and time. Deep Tendon Reflexes: Reflexes normal.   Psychiatric:         Mood and Affect: Mood normal.         Behavior: Behavior normal.         Thought Content: Thought content normal.         Judgment: Judgment normal.            Assessment / Plan:      1. Degenerative disc disease, cervical  OARRs reviewed; no concerning behaviors. Will start muscle relaxant for muscle spasm. Continue to follow up with pain management as scheduled. Will complete [facility] paperwork.     - gabapentin (NEURONTIN) 800 MG tablet; Take 1 tablet by mouth 3 times daily for 30 days. Dispense: 90 tablet; Refill: 2  - tiZANidine (ZANAFLEX) 4 MG tablet; Take 1 tablet by mouth 3 times daily as needed (muscle spasm)  Dispense: 30 tablet; Refill: 0    2. Paresthesia of both hands  Medication refilled. - gabapentin (NEURONTIN) 800 MG tablet; Take 1 tablet by mouth 3 times daily for 30 days. Dispense: 90 tablet; Refill: 2    3. Anxiety  Continue Wellbutrin  mg daily Cymbalta 60 mg ER daily for anxiety. Behavioral counseling recommended. 4. Moderate episode of recurrent major depressive disorder (HCC)  Stable, continue Wellbutrin  mg daily Cymbalta 60 mg ER daily for anxiety. Patient to call if any concerns or SI before his follow up appointment. If he develops suicidal thoughts with a plan, he is instructed to go to emergency room immediately. Behavioral counseling recommended.             Lisbeth Toney, APRN - CNP

## 2021-08-02 RX ORDER — BUDESONIDE AND FORMOTEROL FUMARATE DIHYDRATE 160; 4.5 UG/1; UG/1
AEROSOL RESPIRATORY (INHALATION)
Qty: 30.6 INHALER | Refills: 1 | Status: SHIPPED | OUTPATIENT
Start: 2021-08-02

## 2021-08-18 ENCOUNTER — HOSPITAL ENCOUNTER (OUTPATIENT)
Age: 41
Setting detail: SPECIMEN
Discharge: HOME | End: 2021-08-18
Payer: COMMERCIAL

## 2021-08-18 DIAGNOSIS — R09.81 NASAL CONGESTION: Primary | ICD-10-CM

## 2021-08-18 PROCEDURE — U0003 INFECTIOUS AGENT DETECTION BY NUCLEIC ACID (DNA OR RNA); SEVERE ACUTE RESPIRATORY SYNDROME CORONAVIRUS 2 (SARS-COV-2) (CORONAVIRUS DISEASE [COVID-19]), AMPLIFIED PROBE TECHNIQUE, MAKING USE OF HIGH THROUGHPUT TECHNOLOGIES AS DESCRIBED BY CMS-2020-01-R: HCPCS

## 2021-08-18 PROCEDURE — U0005 INFEC AGEN DETEC AMPLI PROBE: HCPCS

## 2021-08-20 LAB
Lab: NOT DETECTED
SARS-COV-2: NORMAL
TEST NAME: NORMAL

## 2022-09-19 ENCOUNTER — TELEPHONE (OUTPATIENT)
Dept: FAMILY MEDICINE CLINIC | Age: 42
End: 2022-09-19

## 2022-09-19 NOTE — TELEPHONE ENCOUNTER
I left a message for the pt to call us back. His insurance recommends that he be seen for a COPD follow up.

## 2022-11-14 ENCOUNTER — OFFICE VISIT (OUTPATIENT)
Dept: FAMILY MEDICINE CLINIC | Age: 42
End: 2022-11-14
Payer: COMMERCIAL

## 2022-11-14 VITALS
RESPIRATION RATE: 16 BRPM | BODY MASS INDEX: 33.07 KG/M2 | OXYGEN SATURATION: 98 % | WEIGHT: 243.8 LBS | DIASTOLIC BLOOD PRESSURE: 100 MMHG | HEART RATE: 69 BPM | TEMPERATURE: 98.6 F | SYSTOLIC BLOOD PRESSURE: 161 MMHG

## 2022-11-14 DIAGNOSIS — F17.200 TOBACCO USE DISORDER: ICD-10-CM

## 2022-11-14 DIAGNOSIS — J43.9 PULMONARY EMPHYSEMA, UNSPECIFIED EMPHYSEMA TYPE (HCC): Primary | ICD-10-CM

## 2022-11-14 DIAGNOSIS — I10 PRIMARY HYPERTENSION: ICD-10-CM

## 2022-11-14 PROBLEM — N20.1 CALCULUS OF URETER: Status: RESOLVED | Noted: 2018-01-22 | Resolved: 2022-11-14

## 2022-11-14 PROCEDURE — G8419 CALC BMI OUT NRM PARAM NOF/U: HCPCS | Performed by: NURSE PRACTITIONER

## 2022-11-14 PROCEDURE — G8484 FLU IMMUNIZE NO ADMIN: HCPCS | Performed by: NURSE PRACTITIONER

## 2022-11-14 PROCEDURE — 4004F PT TOBACCO SCREEN RCVD TLK: CPT | Performed by: NURSE PRACTITIONER

## 2022-11-14 PROCEDURE — 3074F SYST BP LT 130 MM HG: CPT | Performed by: NURSE PRACTITIONER

## 2022-11-14 PROCEDURE — G8427 DOCREV CUR MEDS BY ELIG CLIN: HCPCS | Performed by: NURSE PRACTITIONER

## 2022-11-14 PROCEDURE — 3023F SPIROM DOC REV: CPT | Performed by: NURSE PRACTITIONER

## 2022-11-14 PROCEDURE — 99214 OFFICE O/P EST MOD 30 MIN: CPT | Performed by: NURSE PRACTITIONER

## 2022-11-14 PROCEDURE — 3078F DIAST BP <80 MM HG: CPT | Performed by: NURSE PRACTITIONER

## 2022-11-14 RX ORDER — BUDESONIDE AND FORMOTEROL FUMARATE DIHYDRATE 160; 4.5 UG/1; UG/1
AEROSOL RESPIRATORY (INHALATION)
Qty: 30.6 EACH | Refills: 1 | Status: CANCELLED | OUTPATIENT
Start: 2022-11-14

## 2022-11-14 RX ORDER — IPRATROPIUM BROMIDE AND ALBUTEROL SULFATE 2.5; .5 MG/3ML; MG/3ML
1 SOLUTION RESPIRATORY (INHALATION) EVERY 6 HOURS PRN
Qty: 360 ML | Refills: 0 | Status: CANCELLED | OUTPATIENT
Start: 2022-11-14

## 2022-11-14 RX ORDER — ALBUTEROL SULFATE 90 UG/1
2 AEROSOL, METERED RESPIRATORY (INHALATION) EVERY 4 HOURS PRN
Qty: 1 EACH | Refills: 5 | Status: SHIPPED | OUTPATIENT
Start: 2022-11-14 | End: 2022-12-14

## 2022-11-14 RX ORDER — LOSARTAN POTASSIUM 50 MG/1
50 TABLET ORAL DAILY
Qty: 30 TABLET | Refills: 1 | Status: SHIPPED | OUTPATIENT
Start: 2022-11-14 | End: 2022-12-14

## 2022-11-14 ASSESSMENT — PATIENT HEALTH QUESTIONNAIRE - PHQ9
1. LITTLE INTEREST OR PLEASURE IN DOING THINGS: 0
7. TROUBLE CONCENTRATING ON THINGS, SUCH AS READING THE NEWSPAPER OR WATCHING TELEVISION: 0
10. IF YOU CHECKED OFF ANY PROBLEMS, HOW DIFFICULT HAVE THESE PROBLEMS MADE IT FOR YOU TO DO YOUR WORK, TAKE CARE OF THINGS AT HOME, OR GET ALONG WITH OTHER PEOPLE: 0
SUM OF ALL RESPONSES TO PHQ QUESTIONS 1-9: 0
8. MOVING OR SPEAKING SO SLOWLY THAT OTHER PEOPLE COULD HAVE NOTICED. OR THE OPPOSITE, BEING SO FIGETY OR RESTLESS THAT YOU HAVE BEEN MOVING AROUND A LOT MORE THAN USUAL: 0
5. POOR APPETITE OR OVEREATING: 0
SUM OF ALL RESPONSES TO PHQ9 QUESTIONS 1 & 2: 0
3. TROUBLE FALLING OR STAYING ASLEEP: 0
6. FEELING BAD ABOUT YOURSELF - OR THAT YOU ARE A FAILURE OR HAVE LET YOURSELF OR YOUR FAMILY DOWN: 0
SUM OF ALL RESPONSES TO PHQ QUESTIONS 1-9: 0
SUM OF ALL RESPONSES TO PHQ QUESTIONS 1-9: 0
9. THOUGHTS THAT YOU WOULD BE BETTER OFF DEAD, OR OF HURTING YOURSELF: 0
4. FEELING TIRED OR HAVING LITTLE ENERGY: 0
SUM OF ALL RESPONSES TO PHQ QUESTIONS 1-9: 0
2. FEELING DOWN, DEPRESSED OR HOPELESS: 0

## 2022-11-14 ASSESSMENT — ANXIETY QUESTIONNAIRES
7. FEELING AFRAID AS IF SOMETHING AWFUL MIGHT HAPPEN: 0
5. BEING SO RESTLESS THAT IT IS HARD TO SIT STILL: 0
IF YOU CHECKED OFF ANY PROBLEMS ON THIS QUESTIONNAIRE, HOW DIFFICULT HAVE THESE PROBLEMS MADE IT FOR YOU TO DO YOUR WORK, TAKE CARE OF THINGS AT HOME, OR GET ALONG WITH OTHER PEOPLE: NOT DIFFICULT AT ALL
3. WORRYING TOO MUCH ABOUT DIFFERENT THINGS: 0
1. FEELING NERVOUS, ANXIOUS, OR ON EDGE: 0
6. BECOMING EASILY ANNOYED OR IRRITABLE: 0
2. NOT BEING ABLE TO STOP OR CONTROL WORRYING: 0
4. TROUBLE RELAXING: 0
GAD7 TOTAL SCORE: 0

## 2022-11-14 NOTE — PROGRESS NOTES
use: Not Currently     Comment: has not drank for over a year/ per pt on 7/15/2020\"drink one time per month-\"        Review of Systems   Constitutional:  Negative for activity change, appetite change, chills, fatigue, fever and unexpected weight change. HENT:  Negative for congestion, ear pain, hearing loss, sore throat and tinnitus. Eyes:  Negative for visual disturbance. Respiratory:  Positive for shortness of breath (at baseline). Negative for cough, chest tightness, wheezing and stridor. Cardiovascular:  Negative for chest pain, palpitations and leg swelling. Gastrointestinal:  Negative for abdominal pain, constipation, diarrhea, nausea and vomiting. Musculoskeletal:  Negative for arthralgias and gait problem. Skin: Negative. Neurological:  Negative for dizziness, tremors, seizures, syncope, speech difficulty, weakness and headaches. Hematological:  Negative for adenopathy. Psychiatric/Behavioral:  Negative for behavioral problems, confusion, dysphoric mood, sleep disturbance and suicidal ideas. The patient is not nervous/anxious. Objective:      BP (!) 161/100 (Site: Right Upper Arm, Position: Sitting, Cuff Size: Large Adult)   Pulse 69   Temp 98.6 °F (37 °C) (Temporal)   Resp 16   Wt 243 lb 12.8 oz (110.6 kg)   SpO2 98%   BMI 33.07 kg/m²      Physical Exam  Vitals reviewed. Constitutional:       General: He is not in acute distress. Appearance: Normal appearance. He is not ill-appearing. HENT:      Head: Normocephalic. Right Ear: External ear normal.      Left Ear: External ear normal.      Mouth/Throat:      Mouth: Mucous membranes are moist.      Pharynx: Oropharynx is clear. Eyes:      Extraocular Movements: Extraocular movements intact. Conjunctiva/sclera: Conjunctivae normal.      Pupils: Pupils are equal, round, and reactive to light. Neck:      Vascular: No carotid bruit. Cardiovascular:      Rate and Rhythm: Normal rate and regular rhythm. Heart sounds: Normal heart sounds. Pulmonary:      Effort: Pulmonary effort is normal.      Breath sounds: Normal breath sounds. Musculoskeletal:         General: Normal range of motion. Cervical back: Normal range of motion and neck supple. Right lower leg: No edema. Left lower leg: No edema. Skin:     General: Skin is warm and dry. Capillary Refill: Capillary refill takes less than 2 seconds. Neurological:      Mental Status: He is alert and oriented to person, place, and time. Assessment / Plan:      1. Pulmonary emphysema, unspecified emphysema type ([address]  Stable, rescue inhaler refill sent to pharmacy. - albuterol sulfate HFA (VENTOLIN HFA) 108 (90 Base) MCG/ACT inhaler; Inhale 2 puffs into the lungs every 4 hours as needed for Wheezing or Shortness of Breath  Dispense: 1 each; Refill: 5    2. Tobacco use disorder  Patient counseled in length on smoking cessation including benefits of quitting and health risks of continuing to smoke including but not limited to lung cancer, COPD, risk of coronary artery disease. Patient verbalized understanding today and is not ready to quit smoking. 3. Primary hypertension  Education provided on the importance of taking medication as prescribed. Will start ARB. Monitor blood pressures. Goal is 130/80 or less. Recommend regular exercise, weight loss , low sodium diet and smoking cessation.   - losartan (COZAAR) 50 MG tablet; Take 1 tablet by mouth daily  Dispense: 30 tablet; Refill: 1  - Comprehensive Metabolic Panel, Fasting; Future  - CBC with Auto Differential; Future  - Lipid, Fasting; Future     The patient,Octavio Jenkins,  was seen with a total time spent of 30 minutes for the visit on this date of service by the E/M provider. The time component had both face to face and non face to face time spent in determining the total time component.   Counseling and education regarding diagnosis listed and options regarding those diagnoses were also included in determining the time component.          MARAH Luo - NP

## 2022-11-15 ASSESSMENT — ENCOUNTER SYMPTOMS
ABDOMINAL PAIN: 0
STRIDOR: 0
CHEST TIGHTNESS: 0
CONSTIPATION: 0
DIARRHEA: 0
COUGH: 0
VOMITING: 0
WHEEZING: 0
SORE THROAT: 0
SHORTNESS OF BREATH: 1
NAUSEA: 0

## 2022-12-10 ENCOUNTER — HOSPITAL ENCOUNTER (OUTPATIENT)
Age: 42
Discharge: HOME | End: 2022-12-10
Payer: COMMERCIAL

## 2022-12-10 LAB
ALBUMIN SERPL-MCNC: 4 GM/DL (ref 3.4–5)
ALP BLD-CCNC: 79 IU/L (ref 40–129)
ALT SERPL-CCNC: 16 U/L (ref 10–40)
ANION GAP SERPL CALCULATED.3IONS-SCNC: 10 MMOL/L (ref 4–16)
AST SERPL-CCNC: 18 IU/L (ref 15–37)
BASOPHILS ABSOLUTE: 0.1 K/CU MM
BASOPHILS RELATIVE PERCENT: 0.4 % (ref 0–1)
BILIRUB SERPL-MCNC: 0.6 MG/DL (ref 0–1)
BUN BLDV-MCNC: 17 MG/DL (ref 6–23)
CALCIUM SERPL-MCNC: 9.5 MG/DL (ref 8.3–10.6)
CHLORIDE BLD-SCNC: 102 MMOL/L (ref 99–110)
CHOLESTEROL, FASTING: 127 MG/DL
CO2: 25 MMOL/L (ref 21–32)
CREAT SERPL-MCNC: 0.9 MG/DL (ref 0.9–1.3)
DIFFERENTIAL TYPE: ABNORMAL
EOSINOPHILS ABSOLUTE: 0.1 K/CU MM
EOSINOPHILS RELATIVE PERCENT: 0.8 % (ref 0–3)
GFR SERPL CREATININE-BSD FRML MDRD: >60 ML/MIN/1.73M2
GLUCOSE FASTING: 105 MG/DL (ref 70–99)
HCT VFR BLD CALC: 51.5 % (ref 42–52)
HDLC SERPL-MCNC: 33 MG/DL
HEMOGLOBIN: 17.1 GM/DL (ref 13.5–18)
IMMATURE NEUTROPHIL %: 0.2 % (ref 0–0.43)
LDL CHOLESTEROL CALCULATED: 82 MG/DL
LYMPHOCYTES ABSOLUTE: 5.1 K/CU MM
LYMPHOCYTES RELATIVE PERCENT: 40.7 % (ref 24–44)
MCH RBC QN AUTO: 28.2 PG (ref 27–31)
MCHC RBC AUTO-ENTMCNC: 33.2 % (ref 32–36)
MCV RBC AUTO: 85 FL (ref 78–100)
MONOCYTES ABSOLUTE: 0.7 K/CU MM
MONOCYTES RELATIVE PERCENT: 5.5 % (ref 0–4)
PDW BLD-RTO: 14.3 % (ref 11.7–14.9)
PLATELET # BLD: 264 K/CU MM (ref 140–440)
PMV BLD AUTO: 8.8 FL (ref 7.5–11.1)
POTASSIUM SERPL-SCNC: 4.1 MMOL/L (ref 3.5–5.1)
RBC # BLD: 6.06 M/CU MM (ref 4.6–6.2)
SEGMENTED NEUTROPHILS ABSOLUTE COUNT: 6.6 K/CU MM
SEGMENTED NEUTROPHILS RELATIVE PERCENT: 52.4 % (ref 36–66)
SODIUM BLD-SCNC: 137 MMOL/L (ref 135–145)
TOTAL IMMATURE NEUTOROPHIL: 0.03 K/CU MM
TOTAL PROTEIN: 6.9 GM/DL (ref 6.4–8.2)
TRIGLYCERIDE, FASTING: 62 MG/DL
WBC # BLD: 12.5 K/CU MM (ref 4–10.5)

## 2022-12-10 PROCEDURE — 80053 COMPREHEN METABOLIC PANEL: CPT

## 2022-12-10 PROCEDURE — 36415 COLL VENOUS BLD VENIPUNCTURE: CPT

## 2022-12-10 PROCEDURE — 85025 COMPLETE CBC W/AUTO DIFF WBC: CPT

## 2022-12-10 PROCEDURE — 80061 LIPID PANEL: CPT

## 2022-12-12 ENCOUNTER — OFFICE VISIT (OUTPATIENT)
Dept: FAMILY MEDICINE CLINIC | Age: 42
End: 2022-12-12
Payer: COMMERCIAL

## 2022-12-12 VITALS
OXYGEN SATURATION: 96 % | TEMPERATURE: 98.1 F | RESPIRATION RATE: 18 BRPM | WEIGHT: 241.4 LBS | HEART RATE: 75 BPM | SYSTOLIC BLOOD PRESSURE: 148 MMHG | BODY MASS INDEX: 32.74 KG/M2 | DIASTOLIC BLOOD PRESSURE: 95 MMHG

## 2022-12-12 DIAGNOSIS — I10 PRIMARY HYPERTENSION: Primary | ICD-10-CM

## 2022-12-12 PROCEDURE — G8484 FLU IMMUNIZE NO ADMIN: HCPCS | Performed by: NURSE PRACTITIONER

## 2022-12-12 PROCEDURE — 3078F DIAST BP <80 MM HG: CPT | Performed by: NURSE PRACTITIONER

## 2022-12-12 PROCEDURE — G8427 DOCREV CUR MEDS BY ELIG CLIN: HCPCS | Performed by: NURSE PRACTITIONER

## 2022-12-12 PROCEDURE — 4004F PT TOBACCO SCREEN RCVD TLK: CPT | Performed by: NURSE PRACTITIONER

## 2022-12-12 PROCEDURE — 3074F SYST BP LT 130 MM HG: CPT | Performed by: NURSE PRACTITIONER

## 2022-12-12 PROCEDURE — 99214 OFFICE O/P EST MOD 30 MIN: CPT | Performed by: NURSE PRACTITIONER

## 2022-12-12 PROCEDURE — G8417 CALC BMI ABV UP PARAM F/U: HCPCS | Performed by: NURSE PRACTITIONER

## 2022-12-12 RX ORDER — TIZANIDINE 4 MG/1
TABLET ORAL
COMMUNITY
Start: 2022-11-30

## 2022-12-12 RX ORDER — LOSARTAN POTASSIUM 100 MG/1
100 TABLET ORAL DAILY
Qty: 30 TABLET | Refills: 1 | Status: SHIPPED | OUTPATIENT
Start: 2022-12-12 | End: 2023-01-11

## 2022-12-12 NOTE — PROGRESS NOTES
time per month-\"        Review of Systems   Constitutional:  Negative for activity change, appetite change, chills, fatigue, fever and unexpected weight change. HENT:  Negative for congestion, ear pain, hearing loss, sore throat, tinnitus, trouble swallowing and voice change. Eyes:  Negative for visual disturbance. Respiratory:  Negative for cough, chest tightness, shortness of breath, wheezing and stridor. Cardiovascular:  Negative for chest pain, palpitations and leg swelling. Gastrointestinal:  Negative for abdominal pain, constipation, diarrhea, nausea and vomiting. Musculoskeletal:  Negative for arthralgias and gait problem. Skin: Negative. Neurological:  Negative for dizziness, tremors, seizures, syncope, speech difficulty, weakness and headaches. Hematological:  Negative for adenopathy. Psychiatric/Behavioral:  Negative for behavioral problems, confusion, sleep disturbance and suicidal ideas. The patient is not nervous/anxious. Objective:      BP (!) 148/95 (Site: Left Upper Arm, Position: Sitting, Cuff Size: Large Adult)   Pulse 75   Temp 98.1 °F (36.7 °C) (Temporal)   Resp 18   Wt 241 lb 6.4 oz (109.5 kg)   SpO2 96%   BMI 32.74 kg/m²      Physical Exam  Vitals reviewed. Constitutional:       General: He is not in acute distress. Appearance: Normal appearance. He is not ill-appearing. HENT:      Head: Normocephalic. Right Ear: External ear normal.      Left Ear: External ear normal.      Mouth/Throat:      Mouth: Mucous membranes are moist.      Pharynx: Oropharynx is clear. Eyes:      Extraocular Movements: Extraocular movements intact. Conjunctiva/sclera: Conjunctivae normal.      Pupils: Pupils are equal, round, and reactive to light. Neck:      Vascular: No carotid bruit. Cardiovascular:      Rate and Rhythm: Normal rate and regular rhythm. Heart sounds: Normal heart sounds.    Pulmonary:      Effort: Pulmonary effort is normal. Breath sounds: Normal breath sounds. Musculoskeletal:         General: Normal range of motion. Cervical back: Normal range of motion and neck supple. Right lower leg: No edema. Left lower leg: No edema. Skin:     General: Skin is warm and dry. Capillary Refill: Capillary refill takes less than 2 seconds. Neurological:      Mental Status: He is alert and oriented to person, place, and time. Assessment / Plan:      1. Primary hypertension  Will increase Losartan to 100 mg daily. Pt to call with new or worsening symptoms. Consider Cardizem if medication change needed. Monitor blood pressures. Goal is 130/80 or less. Regular exercise, weight loss and low sodium diet encouraged. - losartan (COZAAR) 100 MG tablet; Take 1 tablet by mouth daily  Dispense: 30 tablet; Refill: 1     The patient,Octavio Holliday,  was seen with a total time spent of 30 minutes for the visit on this date of service by the E/M provider. The time component had both face to face and non face to face time spent in determining the total time component. Counseling and education regarding diagnosis listed and options regarding those diagnoses were also included in determining the time component.          MARAH Norton - NP

## 2022-12-13 ASSESSMENT — ENCOUNTER SYMPTOMS
VOMITING: 0
SORE THROAT: 0
CHEST TIGHTNESS: 0
VOICE CHANGE: 0
NAUSEA: 0
SHORTNESS OF BREATH: 0
COUGH: 0
CONSTIPATION: 0
TROUBLE SWALLOWING: 0
WHEEZING: 0
STRIDOR: 0
ABDOMINAL PAIN: 0
DIARRHEA: 0

## 2023-01-19 DIAGNOSIS — I10 PRIMARY HYPERTENSION: ICD-10-CM

## 2023-01-19 RX ORDER — LOSARTAN POTASSIUM 100 MG/1
TABLET ORAL
Qty: 30 TABLET | Refills: 1 | Status: SHIPPED | OUTPATIENT
Start: 2023-01-19

## 2023-02-13 ENCOUNTER — NURSE ONLY (OUTPATIENT)
Dept: FAMILY MEDICINE CLINIC | Age: 43
End: 2023-02-13

## 2023-02-13 VITALS — SYSTOLIC BLOOD PRESSURE: 128 MMHG | DIASTOLIC BLOOD PRESSURE: 82 MMHG

## 2023-02-13 DIAGNOSIS — I10 PRIMARY HYPERTENSION: ICD-10-CM

## 2023-02-13 RX ORDER — LOSARTAN POTASSIUM 100 MG/1
TABLET ORAL
Qty: 30 TABLET | Refills: 1 | Status: CANCELLED | OUTPATIENT
Start: 2023-02-13

## 2023-02-13 RX ORDER — LOSARTAN POTASSIUM 100 MG/1
100 TABLET ORAL DAILY
Qty: 30 TABLET | Refills: 11 | Status: SHIPPED | OUTPATIENT
Start: 2023-02-13 | End: 2023-03-15

## 2023-03-18 ENCOUNTER — HOSPITAL ENCOUNTER (OUTPATIENT)
Age: 43
Discharge: HOME | End: 2023-03-18
Payer: COMMERCIAL

## 2023-03-18 ENCOUNTER — HOSPITAL ENCOUNTER (OUTPATIENT)
Dept: GENERAL RADIOLOGY | Age: 43
Discharge: HOME | End: 2023-03-18
Payer: COMMERCIAL

## 2023-03-18 DIAGNOSIS — M47.812 CERVICAL SPONDYLOSIS WITHOUT MYELOPATHY: ICD-10-CM

## 2023-03-18 PROCEDURE — 72040 X-RAY EXAM NECK SPINE 2-3 VW: CPT

## 2023-05-30 ENCOUNTER — HOSPITAL ENCOUNTER (EMERGENCY)
Age: 43
Discharge: HOME | End: 2023-05-30
Attending: EMERGENCY MEDICINE
Payer: COMMERCIAL

## 2023-05-30 VITALS
BODY MASS INDEX: 27.09 KG/M2 | OXYGEN SATURATION: 97 % | RESPIRATION RATE: 17 BRPM | WEIGHT: 200 LBS | TEMPERATURE: 98.1 F | SYSTOLIC BLOOD PRESSURE: 124 MMHG | HEIGHT: 72 IN | HEART RATE: 62 BPM | DIASTOLIC BLOOD PRESSURE: 95 MMHG

## 2023-05-30 DIAGNOSIS — R00.2 PALPITATIONS: Primary | ICD-10-CM

## 2023-05-30 LAB
ALBUMIN SERPL-MCNC: 4.1 GM/DL (ref 3.4–5)
ALP BLD-CCNC: 59 IU/L (ref 40–129)
ALT SERPL-CCNC: 18 U/L (ref 10–40)
ANION GAP SERPL CALCULATED.3IONS-SCNC: 11 MMOL/L (ref 4–16)
AST SERPL-CCNC: 18 IU/L (ref 15–37)
BILIRUB SERPL-MCNC: 0.7 MG/DL (ref 0–1)
BUN SERPL-MCNC: 16 MG/DL (ref 6–23)
CALCIUM SERPL-MCNC: 9.6 MG/DL (ref 8.3–10.6)
CHLORIDE BLD-SCNC: 105 MMOL/L (ref 99–110)
CO2: 23 MMOL/L (ref 21–32)
CREAT SERPL-MCNC: 0.8 MG/DL (ref 0.9–1.3)
EKG ATRIAL RATE: 75 BPM
EKG DIAGNOSIS: NORMAL
EKG P AXIS: 65 DEGREES
EKG P-R INTERVAL: 162 MS
EKG Q-T INTERVAL: 394 MS
EKG QRS DURATION: 76 MS
EKG QTC CALCULATION (BAZETT): 439 MS
EKG R AXIS: 20 DEGREES
EKG T AXIS: 38 DEGREES
EKG VENTRICULAR RATE: 75 BPM
GFR SERPL CREATININE-BSD FRML MDRD: >60 ML/MIN/1.73M2
GLUCOSE SERPL-MCNC: 118 MG/DL (ref 70–99)
POTASSIUM SERPL-SCNC: 4 MMOL/L (ref 3.5–5.1)
SODIUM BLD-SCNC: 139 MMOL/L (ref 135–145)
TOTAL PROTEIN: 6.6 GM/DL (ref 6.4–8.2)

## 2023-05-30 PROCEDURE — 80053 COMPREHEN METABOLIC PANEL: CPT

## 2023-05-30 PROCEDURE — 99284 EMERGENCY DEPT VISIT MOD MDM: CPT

## 2023-05-30 PROCEDURE — 93010 ELECTROCARDIOGRAM REPORT: CPT | Performed by: INTERNAL MEDICINE

## 2023-05-30 PROCEDURE — 93005 ELECTROCARDIOGRAM TRACING: CPT | Performed by: EMERGENCY MEDICINE

## 2023-05-30 RX ORDER — LOSARTAN POTASSIUM 100 MG/1
100 TABLET ORAL DAILY
COMMUNITY

## 2023-05-30 ASSESSMENT — PAIN - FUNCTIONAL ASSESSMENT
PAIN_FUNCTIONAL_ASSESSMENT: ACTIVITIES ARE NOT PREVENTED
PAIN_FUNCTIONAL_ASSESSMENT: 0-10

## 2023-05-30 ASSESSMENT — PAIN SCALES - GENERAL: PAINLEVEL_OUTOF10: 2

## 2023-05-30 ASSESSMENT — ENCOUNTER SYMPTOMS
GASTROINTESTINAL NEGATIVE: 1
RESPIRATORY NEGATIVE: 1

## 2023-05-30 ASSESSMENT — LIFESTYLE VARIABLES
HOW OFTEN DO YOU HAVE A DRINK CONTAINING ALCOHOL: MONTHLY OR LESS
HOW MANY STANDARD DRINKS CONTAINING ALCOHOL DO YOU HAVE ON A TYPICAL DAY: PATIENT DOES NOT DRINK

## 2023-05-30 ASSESSMENT — PAIN DESCRIPTION - PAIN TYPE: TYPE: ACUTE PAIN

## 2023-05-30 ASSESSMENT — PAIN DESCRIPTION - FREQUENCY: FREQUENCY: INTERMITTENT

## 2023-05-30 NOTE — ED PROVIDER NOTES
Triage Chief Complaint:   Dizziness (Pt c/o feeling dizzy at 6 am w/ nausea and vomiting. States as if his heart was racing. Pt drove home and vomited again @6:45. Denies chest pains)    [place]:  Getachew Rankin is a 37 y.o. male that presents to the ED with 2-3 episodes of a rapid heart rate. He did feel little short of breath when it occurred but no pain pressure tightness. Patient is a daily smoker cigarettes uses marijuana daily as well he has no history of CAD he is being treated for high blood pressure. He had no feeling of syncope presyncope no back or leg discomfort currently asymptomatic.         Past Medical History:   Diagnosis Date    Abnormal liver function tests     scheduled for liver bx 10/4/2013    Anxiety     Asthma     last flare up winter 2019    Calculus of ureter 1/22/2018    Chronic abdominal pain     COPD (chronic obstructive pulmonary disease) ([facility] [address]     Depression     FAP (familial adenomatous polyposis)     family hx of this disorder per h&p    Hepatitis C 2006    \"took medication for this and no longer shows up in my blood\"    History of heroin use 07/16/2018    In the ER February 2015 with acute heroin overdose    History of kidney stones 03/27/2018    \"have stone now\"7/15/2020 follow with Dr Nakita Carty    Hypertension     Moderate episode of recurrent major depressive disorder ([facility] [address] 5/7/2014    Sleep apnea     sleep study 2017- had cpap    Wears glasses     to read     Past Surgical History:   Procedure Laterality Date    APPENDECTOMY  age 14    COLONOSCOPY  9/23/13    int hem    CYSTOSCOPY Right 7/16/2020    RIGHT CYSTOSCOPY STONE MANIPULATION performed by Tommy Saravia MD at [address] / STONE Bilateral 7/16/2020    BILATERAL CYSTOSCOPY RETROGRADE PYELOGRAM STENT INSERTION performed by Tommy Saravia MD at [address] [place] [address] Entrance, COLON, DIAGNOSTIC  11/07/2014    gastritis,hiatal hernia    KIDNEY STONE SURGERY      LIVER BIOPSY  7 yr ago    LIVER BIOPSY

## 2023-06-01 RX ORDER — BUSPIRONE HYDROCHLORIDE 15 MG/1
15 TABLET ORAL 3 TIMES DAILY
Qty: 270 TABLET | Refills: 0 | OUTPATIENT
Start: 2023-06-01

## 2023-06-01 NOTE — TELEPHONE ENCOUNTER
I called and asked to speak to Will. His spouse answered and said this is his wife, you can speak to me because Will was not home at the moment. I informed her that she was not on the patient's HIPPA form so I could not speak to her. She was upset and hung up on me.

## 2023-06-13 ENCOUNTER — OFFICE VISIT (OUTPATIENT)
Dept: FAMILY MEDICINE CLINIC | Age: 43
End: 2023-06-13
Payer: COMMERCIAL

## 2023-06-13 VITALS
SYSTOLIC BLOOD PRESSURE: 130 MMHG | RESPIRATION RATE: 16 BRPM | HEART RATE: 64 BPM | DIASTOLIC BLOOD PRESSURE: 82 MMHG | OXYGEN SATURATION: 98 % | WEIGHT: 215.4 LBS | BODY MASS INDEX: 29.21 KG/M2

## 2023-06-13 DIAGNOSIS — J22 LOWER RESPIRATORY INFECTION: ICD-10-CM

## 2023-06-13 DIAGNOSIS — R00.2 PALPITATIONS: ICD-10-CM

## 2023-06-13 DIAGNOSIS — Z87.891 PERSONAL HISTORY OF TOBACCO USE: ICD-10-CM

## 2023-06-13 DIAGNOSIS — F41.1 GAD (GENERALIZED ANXIETY DISORDER): Primary | ICD-10-CM

## 2023-06-13 PROCEDURE — G0296 VISIT TO DETERM LDCT ELIG: HCPCS | Performed by: NURSE PRACTITIONER

## 2023-06-13 PROCEDURE — 99214 OFFICE O/P EST MOD 30 MIN: CPT | Performed by: NURSE PRACTITIONER

## 2023-06-13 PROCEDURE — 4004F PT TOBACCO SCREEN RCVD TLK: CPT | Performed by: NURSE PRACTITIONER

## 2023-06-13 PROCEDURE — 3074F SYST BP LT 130 MM HG: CPT | Performed by: NURSE PRACTITIONER

## 2023-06-13 PROCEDURE — G8417 CALC BMI ABV UP PARAM F/U: HCPCS | Performed by: NURSE PRACTITIONER

## 2023-06-13 PROCEDURE — G8427 DOCREV CUR MEDS BY ELIG CLIN: HCPCS | Performed by: NURSE PRACTITIONER

## 2023-06-13 PROCEDURE — 3078F DIAST BP <80 MM HG: CPT | Performed by: NURSE PRACTITIONER

## 2023-06-13 RX ORDER — AZITHROMYCIN 250 MG/1
250 TABLET, FILM COATED ORAL SEE ADMIN INSTRUCTIONS
Qty: 6 TABLET | Refills: 0 | Status: SHIPPED | OUTPATIENT
Start: 2023-06-13 | End: 2023-06-18

## 2023-06-13 RX ORDER — BUSPIRONE HYDROCHLORIDE 15 MG/1
15 TABLET ORAL 2 TIMES DAILY
Qty: 60 TABLET | Refills: 1 | Status: SHIPPED | OUTPATIENT
Start: 2023-06-13 | End: 2023-07-13

## 2023-06-13 RX ORDER — PREDNISONE 20 MG/1
40 TABLET ORAL DAILY
Qty: 10 TABLET | Refills: 0 | Status: SHIPPED | OUTPATIENT
Start: 2023-06-13 | End: 2023-06-18

## 2023-06-13 RX ORDER — IPRATROPIUM BROMIDE AND ALBUTEROL SULFATE 2.5; .5 MG/3ML; MG/3ML
1 SOLUTION RESPIRATORY (INHALATION) EVERY 6 HOURS PRN
Qty: 360 ML | Refills: 1 | Status: SHIPPED | OUTPATIENT
Start: 2023-06-13

## 2023-06-13 RX ORDER — ALBUTEROL SULFATE 90 UG/1
2 AEROSOL, METERED RESPIRATORY (INHALATION) 4 TIMES DAILY PRN
Qty: 54 G | Refills: 1 | Status: SHIPPED | OUTPATIENT
Start: 2023-06-13

## 2023-06-13 SDOH — ECONOMIC STABILITY: FOOD INSECURITY: WITHIN THE PAST 12 MONTHS, YOU WORRIED THAT YOUR FOOD WOULD RUN OUT BEFORE YOU GOT MONEY TO BUY MORE.: SOMETIMES TRUE

## 2023-06-13 SDOH — ECONOMIC STABILITY: INCOME INSECURITY: HOW HARD IS IT FOR YOU TO PAY FOR THE VERY BASICS LIKE FOOD, HOUSING, MEDICAL CARE, AND HEATING?: NOT VERY HARD

## 2023-06-13 SDOH — ECONOMIC STABILITY: FOOD INSECURITY: WITHIN THE PAST 12 MONTHS, THE FOOD YOU BOUGHT JUST DIDN'T LAST AND YOU DIDN'T HAVE MONEY TO GET MORE.: NEVER TRUE

## 2023-06-13 SDOH — ECONOMIC STABILITY: HOUSING INSECURITY
IN THE LAST 12 MONTHS, WAS THERE A TIME WHEN YOU DID NOT HAVE A STEADY PLACE TO SLEEP OR SLEPT IN A SHELTER (INCLUDING NOW)?: NO

## 2023-06-13 ASSESSMENT — ANXIETY QUESTIONNAIRES
3. WORRYING TOO MUCH ABOUT DIFFERENT THINGS: 3
1. FEELING NERVOUS, ANXIOUS, OR ON EDGE: 1
7. FEELING AFRAID AS IF SOMETHING AWFUL MIGHT HAPPEN: 3
5. BEING SO RESTLESS THAT IT IS HARD TO SIT STILL: 3
6. BECOMING EASILY ANNOYED OR IRRITABLE: 3
GAD7 TOTAL SCORE: 19
4. TROUBLE RELAXING: 3
2. NOT BEING ABLE TO STOP OR CONTROL WORRYING: 3
IF YOU CHECKED OFF ANY PROBLEMS ON THIS QUESTIONNAIRE, HOW DIFFICULT HAVE THESE PROBLEMS MADE IT FOR YOU TO DO YOUR WORK, TAKE CARE OF THINGS AT HOME, OR GET ALONG WITH OTHER PEOPLE: SOMEWHAT DIFFICULT

## 2023-06-13 ASSESSMENT — PATIENT HEALTH QUESTIONNAIRE - PHQ9
10. IF YOU CHECKED OFF ANY PROBLEMS, HOW DIFFICULT HAVE THESE PROBLEMS MADE IT FOR YOU TO DO YOUR WORK, TAKE CARE OF THINGS AT HOME, OR GET ALONG WITH OTHER PEOPLE: 0
7. TROUBLE CONCENTRATING ON THINGS, SUCH AS READING THE NEWSPAPER OR WATCHING TELEVISION: 0
SUM OF ALL RESPONSES TO PHQ QUESTIONS 1-9: 3
8. MOVING OR SPEAKING SO SLOWLY THAT OTHER PEOPLE COULD HAVE NOTICED. OR THE OPPOSITE, BEING SO FIGETY OR RESTLESS THAT YOU HAVE BEEN MOVING AROUND A LOT MORE THAN USUAL: 1
SUM OF ALL RESPONSES TO PHQ QUESTIONS 1-9: 3
1. LITTLE INTEREST OR PLEASURE IN DOING THINGS: 0
SUM OF ALL RESPONSES TO PHQ9 QUESTIONS 1 & 2: 0
6. FEELING BAD ABOUT YOURSELF - OR THAT YOU ARE A FAILURE OR HAVE LET YOURSELF OR YOUR FAMILY DOWN: 0
3. TROUBLE FALLING OR STAYING ASLEEP: 0
5. POOR APPETITE OR OVEREATING: 1
2. FEELING DOWN, DEPRESSED OR HOPELESS: 0
SUM OF ALL RESPONSES TO PHQ QUESTIONS 1-9: 3
SUM OF ALL RESPONSES TO PHQ QUESTIONS 1-9: 3
9. THOUGHTS THAT YOU WOULD BE BETTER OFF DEAD, OR OF HURTING YOURSELF: 0
4. FEELING TIRED OR HAVING LITTLE ENERGY: 1

## 2023-06-19 ASSESSMENT — ENCOUNTER SYMPTOMS
NAUSEA: 0
VOMITING: 0
STRIDOR: 0
WHEEZING: 0
COUGH: 1
ABDOMINAL PAIN: 0
DIARRHEA: 0
SHORTNESS OF BREATH: 1
SORE THROAT: 0
CHEST TIGHTNESS: 0
CONSTIPATION: 0

## 2023-06-21 ENCOUNTER — HOSPITAL ENCOUNTER (OUTPATIENT)
Dept: CT IMAGING | Age: 43
Discharge: HOME | End: 2023-06-21
Payer: COMMERCIAL

## 2023-06-21 DIAGNOSIS — Z87.891 PERSONAL HISTORY OF TOBACCO USE: ICD-10-CM

## 2023-06-21 PROCEDURE — 71271 CT THORAX LUNG CANCER SCR C-: CPT

## 2023-07-06 DIAGNOSIS — F41.1 GAD (GENERALIZED ANXIETY DISORDER): ICD-10-CM

## 2023-07-06 RX ORDER — BUSPIRONE HYDROCHLORIDE 15 MG/1
TABLET ORAL
Qty: 60 TABLET | Refills: 1 | Status: SHIPPED | OUTPATIENT
Start: 2023-07-06

## 2023-07-31 ENCOUNTER — OFFICE VISIT (OUTPATIENT)
Dept: FAMILY MEDICINE CLINIC | Age: 43
End: 2023-07-31
Payer: COMMERCIAL

## 2023-07-31 VITALS
RESPIRATION RATE: 16 BRPM | BODY MASS INDEX: 28.04 KG/M2 | WEIGHT: 207 LBS | HEART RATE: 64 BPM | OXYGEN SATURATION: 98 % | HEIGHT: 72 IN | DIASTOLIC BLOOD PRESSURE: 80 MMHG | TEMPERATURE: 98 F | SYSTOLIC BLOOD PRESSURE: 130 MMHG

## 2023-07-31 DIAGNOSIS — I10 PRIMARY HYPERTENSION: ICD-10-CM

## 2023-07-31 DIAGNOSIS — F41.1 GAD (GENERALIZED ANXIETY DISORDER): Primary | ICD-10-CM

## 2023-07-31 PROCEDURE — 99214 OFFICE O/P EST MOD 30 MIN: CPT | Performed by: NURSE PRACTITIONER

## 2023-07-31 PROCEDURE — 3074F SYST BP LT 130 MM HG: CPT | Performed by: NURSE PRACTITIONER

## 2023-07-31 PROCEDURE — 4004F PT TOBACCO SCREEN RCVD TLK: CPT | Performed by: NURSE PRACTITIONER

## 2023-07-31 PROCEDURE — 3078F DIAST BP <80 MM HG: CPT | Performed by: NURSE PRACTITIONER

## 2023-07-31 PROCEDURE — G8427 DOCREV CUR MEDS BY ELIG CLIN: HCPCS | Performed by: NURSE PRACTITIONER

## 2023-07-31 PROCEDURE — G8417 CALC BMI ABV UP PARAM F/U: HCPCS | Performed by: NURSE PRACTITIONER

## 2023-07-31 RX ORDER — BUSPIRONE HYDROCHLORIDE 15 MG/1
TABLET ORAL
Qty: 60 TABLET | Refills: 3 | Status: SHIPPED | OUTPATIENT
Start: 2023-07-31 | End: 2023-08-04

## 2023-07-31 ASSESSMENT — ANXIETY QUESTIONNAIRES
GAD7 TOTAL SCORE: 13
1. FEELING NERVOUS, ANXIOUS, OR ON EDGE: 3
3. WORRYING TOO MUCH ABOUT DIFFERENT THINGS: 3
IF YOU CHECKED OFF ANY PROBLEMS ON THIS QUESTIONNAIRE, HOW DIFFICULT HAVE THESE PROBLEMS MADE IT FOR YOU TO DO YOUR WORK, TAKE CARE OF THINGS AT HOME, OR GET ALONG WITH OTHER PEOPLE: SOMEWHAT DIFFICULT
2. NOT BEING ABLE TO STOP OR CONTROL WORRYING: 1
7. FEELING AFRAID AS IF SOMETHING AWFUL MIGHT HAPPEN: 3
4. TROUBLE RELAXING: 0
6. BECOMING EASILY ANNOYED OR IRRITABLE: 3
5. BEING SO RESTLESS THAT IT IS HARD TO SIT STILL: 0

## 2023-07-31 NOTE — PROGRESS NOTES
Subjective:      Chief Complaint   Patient presents with    Follow-up     6-8 week f/u. Admits to not having labs drawn    Hypertension    Anxiety       HPI:  Jona Roberson is a 37 y.o. male who presents today for medication follow up. Anxiety  He is prescribed Buspar 15 mg BID. The following anxiety symptoms are still present but have improved: excessive worry, agitation, and fearfulness. He would like to stay on the current dose of medication. Hypertension  He is prescribed Losartan 100 mg daily. He is not exercising and is adherent to a low-salt diet. He is not monitoring BP at home. He denies vision changes, headaches, chest pain/tightness, shortness of breath or edema.      Past Medical History:   Diagnosis Date    Abnormal liver function tests     scheduled for liver bx 10/4/2013    Anxiety     Asthma     last flare up winter 2019    Calculus of ureter 1/22/2018    Chronic abdominal pain     COPD (chronic obstructive pulmonary disease) ([address])     Depression     FAP (familial adenomatous polyposis)     family hx of this disorder per h&p    Hepatitis C 2006    \"took medication for this and no longer shows up in my blood\"    History of heroin use 07/16/2018    In the ER February 2015 with acute heroin overdose    History of kidney stones 03/27/2018    \"have stone now\"7/15/2020 follow with Dr Katiana Rider    Hypertension     Moderate episode of recurrent major depressive disorder ([address]) 5/7/2014    Sleep apnea     sleep study 2017- had cpap    Wears glasses     to read        Social History     Tobacco Use    Smoking status: Every Day     Packs/day: 1.00     Years: 18.00     Pack years: 18.00     Types: Cigarettes    Smokeless tobacco: Never   Substance Use Topics    Alcohol use: Not Currently     Comment: has not drank for over a year/ per pt on 7/15/2020\"drink one time per month-\"        Review of Systems   Constitutional:  Negative for activity change, appetite change, chills, fatigue, fever and unexpected

## 2023-08-04 DIAGNOSIS — F41.1 GAD (GENERALIZED ANXIETY DISORDER): ICD-10-CM

## 2023-08-04 RX ORDER — BUSPIRONE HYDROCHLORIDE 15 MG/1
TABLET ORAL
Qty: 60 TABLET | Refills: 1 | Status: SHIPPED | OUTPATIENT
Start: 2023-08-04

## 2023-08-11 ASSESSMENT — ENCOUNTER SYMPTOMS
TROUBLE SWALLOWING: 0
SHORTNESS OF BREATH: 0
CHEST TIGHTNESS: 0
SORE THROAT: 0
DIARRHEA: 0
CONSTIPATION: 0
NAUSEA: 0
STRIDOR: 0
WHEEZING: 0
COUGH: 0
VOICE CHANGE: 0
ABDOMINAL PAIN: 0
VOMITING: 0

## 2023-08-22 DIAGNOSIS — J22 LOWER RESPIRATORY INFECTION: ICD-10-CM

## 2023-08-22 RX ORDER — IPRATROPIUM BROMIDE AND ALBUTEROL SULFATE 2.5; .5 MG/3ML; MG/3ML
1 SOLUTION RESPIRATORY (INHALATION) EVERY 6 HOURS PRN
Qty: 360 ML | Refills: 1 | Status: SHIPPED | OUTPATIENT
Start: 2023-08-22

## 2023-08-31 DIAGNOSIS — M54.42 CHRONIC LEFT-SIDED LOW BACK PAIN WITH LEFT-SIDED SCIATICA: ICD-10-CM

## 2023-08-31 DIAGNOSIS — G89.29 CHRONIC LEFT-SIDED LOW BACK PAIN WITH LEFT-SIDED SCIATICA: ICD-10-CM

## 2023-08-31 DIAGNOSIS — M54.12 CERVICAL RADICULOPATHY: Primary | ICD-10-CM

## 2023-08-31 DIAGNOSIS — M48.02 NEURAL FORAMINAL STENOSIS OF CERVICAL SPINE: ICD-10-CM

## 2023-09-15 ENCOUNTER — OFFICE VISIT (OUTPATIENT)
Dept: FAMILY MEDICINE CLINIC | Age: 43
End: 2023-09-15
Payer: COMMERCIAL

## 2023-09-15 VITALS
HEIGHT: 72 IN | WEIGHT: 204.8 LBS | HEART RATE: 74 BPM | OXYGEN SATURATION: 98 % | RESPIRATION RATE: 16 BRPM | BODY MASS INDEX: 27.74 KG/M2 | SYSTOLIC BLOOD PRESSURE: 126 MMHG | TEMPERATURE: 98.1 F | DIASTOLIC BLOOD PRESSURE: 70 MMHG

## 2023-09-15 DIAGNOSIS — J06.9 VIRAL URI: Primary | ICD-10-CM

## 2023-09-15 DIAGNOSIS — H60.503 ACUTE OTITIS EXTERNA OF BOTH EARS, UNSPECIFIED TYPE: ICD-10-CM

## 2023-09-15 PROCEDURE — 3078F DIAST BP <80 MM HG: CPT | Performed by: NURSE PRACTITIONER

## 2023-09-15 PROCEDURE — G8417 CALC BMI ABV UP PARAM F/U: HCPCS | Performed by: NURSE PRACTITIONER

## 2023-09-15 PROCEDURE — 99213 OFFICE O/P EST LOW 20 MIN: CPT | Performed by: NURSE PRACTITIONER

## 2023-09-15 PROCEDURE — 4004F PT TOBACCO SCREEN RCVD TLK: CPT | Performed by: NURSE PRACTITIONER

## 2023-09-15 PROCEDURE — 3074F SYST BP LT 130 MM HG: CPT | Performed by: NURSE PRACTITIONER

## 2023-09-15 PROCEDURE — G8427 DOCREV CUR MEDS BY ELIG CLIN: HCPCS | Performed by: NURSE PRACTITIONER

## 2023-09-15 PROCEDURE — 4130F TOPICAL PREP RX AOE: CPT | Performed by: NURSE PRACTITIONER

## 2023-09-15 RX ORDER — ACETIC ACID 20.65 MG/ML
4 SOLUTION AURICULAR (OTIC) 4 TIMES DAILY
Qty: 15 ML | Refills: 0 | Status: SHIPPED | OUTPATIENT
Start: 2023-09-15 | End: 2023-09-22

## 2023-09-15 RX ORDER — BENZONATATE 200 MG/1
200 CAPSULE ORAL 3 TIMES DAILY PRN
Qty: 30 CAPSULE | Refills: 0 | Status: SHIPPED | OUTPATIENT
Start: 2023-09-15 | End: 2023-09-25

## 2023-09-15 ASSESSMENT — ENCOUNTER SYMPTOMS
EYE PAIN: 0
WHEEZING: 0
SINUS PRESSURE: 0
EYE DISCHARGE: 0
COUGH: 1
SHORTNESS OF BREATH: 0
CHEST TIGHTNESS: 0
SORE THROAT: 0
VOICE CHANGE: 0
TROUBLE SWALLOWING: 0
EYE ITCHING: 0
SINUS PAIN: 0
EYE REDNESS: 0
RHINORRHEA: 1

## 2023-10-17 ENCOUNTER — OFFICE VISIT (OUTPATIENT)
Dept: FAMILY MEDICINE CLINIC | Age: 43
End: 2023-10-17
Payer: COMMERCIAL

## 2023-10-17 VITALS
TEMPERATURE: 98.2 F | DIASTOLIC BLOOD PRESSURE: 78 MMHG | WEIGHT: 210.8 LBS | BODY MASS INDEX: 28.55 KG/M2 | HEIGHT: 72 IN | OXYGEN SATURATION: 98 % | RESPIRATION RATE: 16 BRPM | HEART RATE: 68 BPM | SYSTOLIC BLOOD PRESSURE: 126 MMHG

## 2023-10-17 DIAGNOSIS — R42 DIZZINESS: Primary | ICD-10-CM

## 2023-10-17 DIAGNOSIS — R11.2 NAUSEA AND VOMITING, UNSPECIFIED VOMITING TYPE: ICD-10-CM

## 2023-10-17 DIAGNOSIS — R68.83 CHILLS (WITHOUT FEVER): ICD-10-CM

## 2023-10-17 LAB
INFLUENZA VIRUS A RNA: NEGATIVE
INFLUENZA VIRUS B RNA: NEGATIVE

## 2023-10-17 PROCEDURE — G8427 DOCREV CUR MEDS BY ELIG CLIN: HCPCS | Performed by: NURSE PRACTITIONER

## 2023-10-17 PROCEDURE — G8484 FLU IMMUNIZE NO ADMIN: HCPCS | Performed by: NURSE PRACTITIONER

## 2023-10-17 PROCEDURE — 87502 INFLUENZA DNA AMP PROBE: CPT | Performed by: NURSE PRACTITIONER

## 2023-10-17 PROCEDURE — 3074F SYST BP LT 130 MM HG: CPT | Performed by: NURSE PRACTITIONER

## 2023-10-17 PROCEDURE — 3078F DIAST BP <80 MM HG: CPT | Performed by: NURSE PRACTITIONER

## 2023-10-17 PROCEDURE — G8417 CALC BMI ABV UP PARAM F/U: HCPCS | Performed by: NURSE PRACTITIONER

## 2023-10-17 PROCEDURE — 99213 OFFICE O/P EST LOW 20 MIN: CPT | Performed by: NURSE PRACTITIONER

## 2023-10-17 PROCEDURE — 4004F PT TOBACCO SCREEN RCVD TLK: CPT | Performed by: NURSE PRACTITIONER

## 2023-10-17 RX ORDER — MECLIZINE HYDROCHLORIDE 25 MG/1
25 TABLET ORAL 3 TIMES DAILY PRN
Qty: 30 TABLET | Refills: 0 | Status: SHIPPED | OUTPATIENT
Start: 2023-10-17 | End: 2023-10-27

## 2023-10-17 ASSESSMENT — ENCOUNTER SYMPTOMS
VOMITING: 1
SHORTNESS OF BREATH: 0
ABDOMINAL PAIN: 0
CHEST TIGHTNESS: 0
NAUSEA: 1
STRIDOR: 0
SORE THROAT: 0
DIARRHEA: 0
CONSTIPATION: 0
COUGH: 0
WHEEZING: 0

## 2023-10-17 NOTE — PROGRESS NOTES
Subjective:      Chief Complaint   Patient presents with    Nausea     Started today at work, dizziness and headache, vomiting, chills, vision blurry       HPI:  Duyen Callahan is a 37 y.o. male who presents today for complains of fatigue, chills, headache, blurred vision, dizziness, nausea and vomiting. Onset of symptoms was today. He became dizzy at work and then vomited. He was sent home and developed a headache with blurred vision once he was home. He went to sleep and when he woke up he had chills. He denies fever, body aches, nasal congestion, sore throat or abdominal pain. He has a hx of migraines but this doesn't seem like a normal migraine. He took Ibuprofen and headache is mostly gone.       Past Medical History:   Diagnosis Date    Abnormal liver function tests     scheduled for liver bx 10/4/2013    Anxiety     Asthma     last flare up winter 2019    Calculus of ureter 1/22/2018    Chronic abdominal pain     COPD (chronic obstructive pulmonary disease) ([address])     Depression     FAP (familial adenomatous polyposis)     family hx of this disorder per h&p    Hepatitis C 2006    \"took medication for this and no longer shows up in my blood\"    History of heroin use 07/16/2018    In the ER February 2015 with acute heroin overdose    History of kidney stones 03/27/2018    \"have stone now\"7/15/2020 follow with Dr Myrna Jackson    Hypertension     Moderate episode of recurrent major depressive disorder ([address]) 5/7/2014    Sleep apnea     sleep study 2017- had cpap    Wears glasses     to read        Social History     Tobacco Use    Smoking status: Every Day     Packs/day: 1.00     Years: 18.00     Additional pack years: 0.00     Total pack years: 18.00     Types: Cigarettes    Smokeless tobacco: Never   Substance Use Topics    Alcohol use: Not Currently     Comment: has not drank for over a year/ per pt on 7/15/2020\"drink one time per month-\"        Review of Systems   Constitutional:  Positive for chills and

## 2023-10-26 DIAGNOSIS — R42 DIZZINESS: ICD-10-CM

## 2023-10-26 RX ORDER — MECLIZINE HYDROCHLORIDE 25 MG/1
25 TABLET ORAL 3 TIMES DAILY PRN
Qty: 30 TABLET | Refills: 0 | Status: SHIPPED | OUTPATIENT
Start: 2023-10-26 | End: 2023-11-05

## 2023-10-31 ENCOUNTER — OFFICE VISIT (OUTPATIENT)
Dept: FAMILY MEDICINE CLINIC | Age: 43
End: 2023-10-31
Payer: COMMERCIAL

## 2023-10-31 VITALS
TEMPERATURE: 98.2 F | WEIGHT: 215.6 LBS | SYSTOLIC BLOOD PRESSURE: 132 MMHG | BODY MASS INDEX: 29.24 KG/M2 | DIASTOLIC BLOOD PRESSURE: 76 MMHG | HEART RATE: 73 BPM | RESPIRATION RATE: 16 BRPM | OXYGEN SATURATION: 96 %

## 2023-10-31 DIAGNOSIS — I10 PRIMARY HYPERTENSION: Primary | ICD-10-CM

## 2023-10-31 DIAGNOSIS — F41.1 GAD (GENERALIZED ANXIETY DISORDER): ICD-10-CM

## 2023-10-31 PROCEDURE — 3075F SYST BP GE 130 - 139MM HG: CPT | Performed by: NURSE PRACTITIONER

## 2023-10-31 PROCEDURE — G8484 FLU IMMUNIZE NO ADMIN: HCPCS | Performed by: NURSE PRACTITIONER

## 2023-10-31 PROCEDURE — 99214 OFFICE O/P EST MOD 30 MIN: CPT | Performed by: NURSE PRACTITIONER

## 2023-10-31 PROCEDURE — 3078F DIAST BP <80 MM HG: CPT | Performed by: NURSE PRACTITIONER

## 2023-10-31 PROCEDURE — 4004F PT TOBACCO SCREEN RCVD TLK: CPT | Performed by: NURSE PRACTITIONER

## 2023-10-31 PROCEDURE — G8417 CALC BMI ABV UP PARAM F/U: HCPCS | Performed by: NURSE PRACTITIONER

## 2023-10-31 PROCEDURE — G8427 DOCREV CUR MEDS BY ELIG CLIN: HCPCS | Performed by: NURSE PRACTITIONER

## 2023-10-31 RX ORDER — BUSPIRONE HYDROCHLORIDE 15 MG/1
15 TABLET ORAL 2 TIMES DAILY
Qty: 60 TABLET | Refills: 5 | Status: SHIPPED | OUTPATIENT
Start: 2023-10-31

## 2023-10-31 ASSESSMENT — ENCOUNTER SYMPTOMS
SORE THROAT: 0
TROUBLE SWALLOWING: 0
SHORTNESS OF BREATH: 0
WHEEZING: 0
ABDOMINAL PAIN: 0
CHEST TIGHTNESS: 0
VOMITING: 0
VOICE CHANGE: 0
NAUSEA: 0
CONSTIPATION: 0
DIARRHEA: 0
COUGH: 0
STRIDOR: 0

## 2023-10-31 NOTE — PROGRESS NOTES
Musculoskeletal:         General: Normal range of motion. Cervical back: Normal range of motion and neck supple. Right lower leg: No edema. Left lower leg: No edema. Lymphadenopathy:      Cervical: No cervical adenopathy. Skin:     General: Skin is warm and dry. Capillary Refill: Capillary refill takes less than 2 seconds. Neurological:      Mental Status: He is alert and oriented to person, place, and time. Psychiatric:         Attention and Perception: Attention and perception normal.         Mood and Affect: Mood is anxious (mild). Mood is not depressed. Speech: Speech normal.         Behavior: Behavior is cooperative. Thought Content: Thought content normal.         Cognition and Memory: Cognition and memory normal.            Assessment / Plan:      1. MELANIE (generalized anxiety disorder)  Stable. Continue medication. Behavioral counseling recommended. - busPIRone (BUSPAR) 15 MG tablet; Take 15 mg by mouth in the morning and at bedtime  Dispense: 60 tablet; Refill: 5    2. Primary hypertension  BP stable, continue current medication. Regular exercise, low sodium diet and weight loss recommended. If you smoke stop smoking. Monitor blood pressures. Goal is 130/80 or less. Bring your blood pressure recordings to your next appointment, or you can send them to us. Bring your home blood pressure machine with you to your next appointment. The patient,Octavio High,  was seen with a total time spent of 30 minutes for the visit on this date of service by the E/M provider. The time component had both face to face and non face to face time spent in determining the total time component. Counseling and education regarding diagnosis listed and options regarding those diagnoses were also included in determining the time component.             MARAH Lind NP

## 2023-11-30 DIAGNOSIS — F41.1 GAD (GENERALIZED ANXIETY DISORDER): ICD-10-CM

## 2023-11-30 RX ORDER — BUSPIRONE HYDROCHLORIDE 15 MG/1
TABLET ORAL
Qty: 60 TABLET | Refills: 3 | OUTPATIENT
Start: 2023-11-30

## 2024-01-26 DIAGNOSIS — I10 PRIMARY HYPERTENSION: ICD-10-CM

## 2024-01-26 RX ORDER — LOSARTAN POTASSIUM 100 MG/1
100 TABLET ORAL DAILY
Qty: 30 TABLET | Refills: 2 | Status: SHIPPED | OUTPATIENT
Start: 2024-01-26

## 2024-01-30 ENCOUNTER — TELEPHONE (OUTPATIENT)
Dept: FAMILY MEDICINE CLINIC | Age: 44
End: 2024-01-30

## 2024-02-07 ENCOUNTER — OFFICE VISIT (OUTPATIENT)
Dept: FAMILY MEDICINE CLINIC | Age: 44
End: 2024-02-07
Payer: COMMERCIAL

## 2024-02-07 VITALS
SYSTOLIC BLOOD PRESSURE: 142 MMHG | HEIGHT: 72 IN | HEART RATE: 78 BPM | TEMPERATURE: 96.9 F | DIASTOLIC BLOOD PRESSURE: 93 MMHG | WEIGHT: 205.6 LBS | OXYGEN SATURATION: 98 % | BODY MASS INDEX: 27.85 KG/M2 | RESPIRATION RATE: 16 BRPM

## 2024-02-07 DIAGNOSIS — J44.9 COPD WITHOUT EXACERBATION (HCC): ICD-10-CM

## 2024-02-07 DIAGNOSIS — I10 PRIMARY HYPERTENSION: ICD-10-CM

## 2024-02-07 DIAGNOSIS — D13.91 FAMILIAL POLYPOSIS: ICD-10-CM

## 2024-02-07 DIAGNOSIS — Z86.19 HISTORY OF HEPATITIS C: ICD-10-CM

## 2024-02-07 DIAGNOSIS — Z12.11 COLON CANCER SCREENING: ICD-10-CM

## 2024-02-07 DIAGNOSIS — Z76.89 ENCOUNTER TO ESTABLISH CARE WITH NEW DOCTOR: ICD-10-CM

## 2024-02-07 DIAGNOSIS — R45.4 EXCESSIVE ANGER: Primary | ICD-10-CM

## 2024-02-07 PROCEDURE — G8427 DOCREV CUR MEDS BY ELIG CLIN: HCPCS | Performed by: PHYSICIAN ASSISTANT

## 2024-02-07 PROCEDURE — 99214 OFFICE O/P EST MOD 30 MIN: CPT | Performed by: PHYSICIAN ASSISTANT

## 2024-02-07 PROCEDURE — G8417 CALC BMI ABV UP PARAM F/U: HCPCS | Performed by: PHYSICIAN ASSISTANT

## 2024-02-07 PROCEDURE — 3077F SYST BP >= 140 MM HG: CPT | Performed by: PHYSICIAN ASSISTANT

## 2024-02-07 PROCEDURE — 4004F PT TOBACCO SCREEN RCVD TLK: CPT | Performed by: PHYSICIAN ASSISTANT

## 2024-02-07 PROCEDURE — 3080F DIAST BP >= 90 MM HG: CPT | Performed by: PHYSICIAN ASSISTANT

## 2024-02-07 PROCEDURE — G8484 FLU IMMUNIZE NO ADMIN: HCPCS | Performed by: PHYSICIAN ASSISTANT

## 2024-02-07 PROCEDURE — 3023F SPIROM DOC REV: CPT | Performed by: PHYSICIAN ASSISTANT

## 2024-02-07 RX ORDER — ESCITALOPRAM OXALATE 10 MG/1
10 TABLET ORAL DAILY
Qty: 30 TABLET | Refills: 3 | Status: SHIPPED | OUTPATIENT
Start: 2024-02-07

## 2024-02-07 ASSESSMENT — PATIENT HEALTH QUESTIONNAIRE - PHQ9
SUM OF ALL RESPONSES TO PHQ QUESTIONS 1-9: 0
2. FEELING DOWN, DEPRESSED OR HOPELESS: 0
SUM OF ALL RESPONSES TO PHQ QUESTIONS 1-9: 0
1. LITTLE INTEREST OR PLEASURE IN DOING THINGS: 0
SUM OF ALL RESPONSES TO PHQ QUESTIONS 1-9: 0
SUM OF ALL RESPONSES TO PHQ9 QUESTIONS 1 & 2: 0
SUM OF ALL RESPONSES TO PHQ QUESTIONS 1-9: 0

## 2024-02-07 NOTE — PROGRESS NOTES
Take 1 tablet by mouth daily  Dispense: 30 tablet; Refill: 3    3. Colon cancer screening  Familial polyposis  - Merissa Dewitt MD, Gastroenterology, [place]    4. Primary hypertension  Wc continue    5. COPD without exacerbation (HCC)  Stable, continue meds prn     6. Familial polyposis      7. History of hepatitis C        Return in about 3 months (around 5/7/2024).         Electronically signed by PHIL Mercado on 2/7/2024      Comment: Please note this report has been produced using speech recognition software and may contain errors related to that system including errors in grammar, punctuation, and spelling, as well as words and phrases that may be inappropriate. If there are any questions or concerns please feel free to contact the dictating provider for clarification.

## 2024-02-08 ENCOUNTER — FOLLOWUP TELEPHONE ENCOUNTER (OUTPATIENT)
Dept: FAMILY MEDICINE CLINIC | Age: 44
End: 2024-02-08

## 2024-02-09 ENCOUNTER — TELEPHONE (OUTPATIENT)
Dept: GASTROENTEROLOGY | Age: 44
End: 2024-02-09

## 2024-02-09 NOTE — TELEPHONE ENCOUNTER
Called pt. In regards to a referral for a colon screening LM for pt to call back to make an appt.

## 2024-02-16 ENCOUNTER — TELEPHONE (OUTPATIENT)
Dept: GASTROENTEROLOGY | Age: 44
End: 2024-02-16

## 2024-02-28 ENCOUNTER — TELEPHONE (OUTPATIENT)
Dept: GASTROENTEROLOGY | Age: 44
End: 2024-02-28

## 2024-04-10 NOTE — PROGRESS NOTES
Patient will be called with an arrival time on 4/15/2024 for his procedure at [facility] on 4/16/2024.    NOTHING TO EAT OR DRINK AFTER MIDNIGHT DAY OF SURGERY    1. Enter thru the hospital main entrance on day of surgery, check in at the Information Desk. If you arrive prior to 6:00am, enter thru the ER entrance.    2. Follow the directions as prescribed by the doctor for your procedure and medications.         Morning of surgery take:norco, buspar and lexapro.         Stop vitamins, supplements and NSAIDS:      3. Check with your Doctor regarding stopping blood thinners and follow their instructions.    4. Do not smoke, vape or use chewing tobacco morning of surgery. Do not drink any alcoholic beverages 24 hours prior to surgery.       This includes NA Beer. No street drugs 7 days prior to surgery.    5. If you have dentures, contacts of glasses they will be removed before going to the OR; please bring a case.    6. Please bring picture ID, insurance card, paperwork from the doctor’s office (H & P, Consent, & card for implantable devices).    7. Take a shower with an antibacterial soap the night before surgery and the morning of surgery. Do not put anything on your skin      After your morning shower.    8. You will need a responsible adult to drive you home and check on you after surgery.

## 2024-04-15 ENCOUNTER — ANESTHESIA EVENT (OUTPATIENT)
Dept: ENDOSCOPY | Age: 44
End: 2024-04-15
Payer: COMMERCIAL

## 2024-04-15 ASSESSMENT — LIFESTYLE VARIABLES: SMOKING_STATUS: 1

## 2024-04-15 NOTE — PROGRESS NOTES
Spoke with patient and he will arrive at 0830 at [facility] on 4/16/2024 for his procedure at 1000.Also went over prep times with patient. IV order is in epic.

## 2024-04-15 NOTE — ANESTHESIA PRE PROCEDURE
status migrainosus, not intractable G43.109    Chronic left-sided low back pain with left-sided sciatica M54.42, G89.29    Chronic pain of left knee M25.562, G89.29    COPD without exacerbation (HCC) J44.9    Low testosterone in male R79.89    Neural foraminal stenosis of cervical spine M48.02    Osteophyte of cervical spine M25.78    Skin ulcer of toe of left foot with fat layer exposed (HCC) L97.522    Chills (without fever) R68.83    Nausea & vomiting R11.2       Past Medical History:        Diagnosis Date    Abnormal liver function tests     scheduled for liver bx 10/4/2013    Anxiety     Asthma     last flare up winter 2019    Calculus of ureter 01/22/2018    Chronic abdominal pain     COPD (chronic obstructive pulmonary disease) (HCC)     Depression     FAP (familial adenomatous polyposis)     family hx of this disorder per h&p    Hepatitis C 2006    \"took medication for this and no longer shows up in my blood\"    History of heroin use 07/16/2018    In the ER February 2015 with acute heroin overdose    History of kidney stones 03/27/2018    \"have stone now\"7/15/2020 follow with Dr Gunn    Hypertension     Moderate episode of recurrent major depressive disorder (HCC) 05/07/2014    Nausea & vomiting 10/17/2023    Neck pain     pinched nerve    Sleep apnea     sleep study 2017- had cpap    Wears glasses     to read       Past Surgical History:        Procedure Laterality Date    APPENDECTOMY  age 15    COLONOSCOPY  9/23/13    int hem    CYSTOSCOPY Right 7/16/2020    RIGHT CYSTOSCOPY STONE MANIPULATION performed by Santino Hooper MD at [facility] OR    CYSTOSCOPY INSERTION / REMOVAL STENT / STONE Bilateral 7/16/2020    BILATERAL CYSTOSCOPY RETROGRADE PYELOGRAM STENT INSERTION performed by Santino Hooper MD at [facility] OR    ENDOSCOPY, COLON, DIAGNOSTIC  11/07/2014    gastritis,hiatal hernia    KIDNEY STONE SURGERY      LIVER BIOPSY  7 yr ago    LIVER BIOPSY  10/04/2013    TONSILLECTOMY  as a kid       Social History:

## 2024-04-16 ENCOUNTER — HOSPITAL ENCOUNTER (OUTPATIENT)
Age: 44
Setting detail: OUTPATIENT SURGERY
Discharge: HOME | End: 2024-04-16
Attending: INTERNAL MEDICINE | Admitting: INTERNAL MEDICINE
Payer: COMMERCIAL

## 2024-04-16 ENCOUNTER — ANESTHESIA (OUTPATIENT)
Dept: ENDOSCOPY | Age: 44
End: 2024-04-16
Payer: COMMERCIAL

## 2024-04-16 VITALS
TEMPERATURE: 97.4 F | BODY MASS INDEX: 27.77 KG/M2 | HEIGHT: 72 IN | SYSTOLIC BLOOD PRESSURE: 158 MMHG | OXYGEN SATURATION: 98 % | WEIGHT: 205 LBS | HEART RATE: 70 BPM | RESPIRATION RATE: 18 BRPM | DIASTOLIC BLOOD PRESSURE: 82 MMHG

## 2024-04-16 DIAGNOSIS — Z12.11 COLON CANCER SCREENING: ICD-10-CM

## 2024-04-16 PROCEDURE — 88305 TISSUE EXAM BY PATHOLOGIST: CPT | Performed by: PATHOLOGY

## 2024-04-16 PROCEDURE — 2580000003 HC RX 258: Performed by: ANESTHESIOLOGY

## 2024-04-16 PROCEDURE — 2500000003 HC RX 250 WO HCPCS: Performed by: NURSE ANESTHETIST, CERTIFIED REGISTERED

## 2024-04-16 PROCEDURE — 3609010600 HC COLONOSCOPY POLYPECTOMY SNARE/COLD BIOPSY: Performed by: INTERNAL MEDICINE

## 2024-04-16 PROCEDURE — 6360000002 HC RX W HCPCS: Performed by: NURSE ANESTHETIST, CERTIFIED REGISTERED

## 2024-04-16 PROCEDURE — 3700000000 HC ANESTHESIA ATTENDED CARE: Performed by: INTERNAL MEDICINE

## 2024-04-16 PROCEDURE — 6370000000 HC RX 637 (ALT 250 FOR IP): Performed by: INTERNAL MEDICINE

## 2024-04-16 PROCEDURE — 7100000010 HC PHASE II RECOVERY - FIRST 15 MIN: Performed by: INTERNAL MEDICINE

## 2024-04-16 PROCEDURE — 7100000011 HC PHASE II RECOVERY - ADDTL 15 MIN: Performed by: INTERNAL MEDICINE

## 2024-04-16 PROCEDURE — 2709999900 HC NON-CHARGEABLE SUPPLY: Performed by: INTERNAL MEDICINE

## 2024-04-16 PROCEDURE — 2580000003 HC RX 258: Performed by: INTERNAL MEDICINE

## 2024-04-16 PROCEDURE — 3700000001 HC ADD 15 MINUTES (ANESTHESIA): Performed by: INTERNAL MEDICINE

## 2024-04-16 RX ORDER — LIDOCAINE HYDROCHLORIDE 20 MG/ML
INJECTION, SOLUTION INFILTRATION; PERINEURAL PRN
Status: DISCONTINUED | OUTPATIENT
Start: 2024-04-16 | End: 2024-04-16 | Stop reason: SDUPTHER

## 2024-04-16 RX ORDER — SODIUM CHLORIDE, SODIUM LACTATE, POTASSIUM CHLORIDE, CALCIUM CHLORIDE 600; 310; 30; 20 MG/100ML; MG/100ML; MG/100ML; MG/100ML
INJECTION, SOLUTION INTRAVENOUS CONTINUOUS
Status: DISCONTINUED | OUTPATIENT
Start: 2024-04-16 | End: 2024-04-16 | Stop reason: HOSPADM

## 2024-04-16 RX ORDER — PROPOFOL 10 MG/ML
INJECTION, EMULSION INTRAVENOUS PRN
Status: DISCONTINUED | OUTPATIENT
Start: 2024-04-16 | End: 2024-04-16 | Stop reason: SDUPTHER

## 2024-04-16 RX ORDER — ONDANSETRON 2 MG/ML
INJECTION INTRAMUSCULAR; INTRAVENOUS PRN
Status: DISCONTINUED | OUTPATIENT
Start: 2024-04-16 | End: 2024-04-16 | Stop reason: SDUPTHER

## 2024-04-16 RX ADMIN — LIDOCAINE HYDROCHLORIDE 100 MG: 20 INJECTION, SOLUTION INFILTRATION; PERINEURAL at 09:40

## 2024-04-16 RX ADMIN — PROPOFOL 400 MG: 10 INJECTION, EMULSION INTRAVENOUS at 09:40

## 2024-04-16 RX ADMIN — SODIUM CHLORIDE, POTASSIUM CHLORIDE, SODIUM LACTATE AND CALCIUM CHLORIDE: 600; 310; 30; 20 INJECTION, SOLUTION INTRAVENOUS at 08:53

## 2024-04-16 RX ADMIN — ONDANSETRON 4 MG: 2 INJECTION INTRAMUSCULAR; INTRAVENOUS at 09:31

## 2024-04-16 ASSESSMENT — PAIN - FUNCTIONAL ASSESSMENT
PAIN_FUNCTIONAL_ASSESSMENT: 0-10
PAIN_FUNCTIONAL_ASSESSMENT: 0-10

## 2024-04-16 NOTE — H&P
ENDOSCOPY   Pre-operative History and Physical    Patient: Octavio Holliday  : 1980      History Obtained From:  patient, electronic medical record        HISTORY OF PRESENT ILLNESS:                The patient is a 44 y.o. male with significant past medical history as below who presents for colonoscopy    Indication Colon cancer screening - Fam hx of FAP ??     Past Medical History:        Diagnosis Date    Abnormal liver function tests     scheduled for liver bx 10/4/2013    Anxiety     Asthma     last flare up winter 2019    Calculus of ureter 2018    Chronic abdominal pain     COPD (chronic obstructive pulmonary disease) (HCC)     Depression     FAP (familial adenomatous polyposis)     family hx of this disorder per h&p    Hepatitis C     \"took medication for this and no longer shows up in my blood\"    History of heroin use 2018    In the ER 2015 with acute heroin overdose    History of kidney stones 2018    \"have stone now\"7/15/2020 follow with Dr Gunn    Hypertension     Moderate episode of recurrent major depressive disorder (HCC) 2014    Nausea & vomiting 10/17/2023    Neck pain     pinched nerve    Sleep apnea     sleep study - had cpap    Wears glasses     to read       Past Surgical History:        Procedure Laterality Date    APPENDECTOMY  age 15    COLONOSCOPY  11    int hem    CYSTOSCOPY Right 2020    RIGHT CYSTOSCOPY STONE MANIPULATION performed by Santino Hooper MD at [facility] OR    CYSTOSCOPY INSERTION / REMOVAL STENT / STONE Bilateral 2020    BILATERAL CYSTOSCOPY RETROGRADE PYELOGRAM STENT INSERTION performed by Santino Hooper MD at [facility] OR    ENDOSCOPY, COLON, DIAGNOSTIC  2014    gastritis,hiatal hernia    KIDNEY STONE SURGERY      LIVER BIOPSY  7 yr ago    LIVER BIOPSY  10/04/2013    TONSILLECTOMY  as a kid         Current Medications:    Medications    Prior to Admission medications    Medication Sig Start Date End Date Taking?

## 2024-04-16 NOTE — PROGRESS NOTES
Returned to room 9. Report received from Joanne CROWDER. Alert and oriented. Respirations even and unlabored. Color pink. Abdomen soft and nontender. Beverage provided. Call light in reach. Wife supportive at bedside.

## 2024-04-16 NOTE — ANESTHESIA POSTPROCEDURE EVALUATION
Department of Anesthesiology  Postprocedure Note    Patient: Octavio Holliday  MRN: [id number]  YOB: 1980  Date of evaluation: 4/16/2024    Procedure Summary       Date: 04/16/24 Room / Location: [place] [address] / [facility]    Anesthesia Start: 0924 Anesthesia Stop: 1018    Procedure: COLONOSCOPY POLYPECTOMY SNARE BIOPSY Diagnosis:       Colon cancer screening      (Colon cancer screening [Z12.11])    Surgeons: Merissa Burrell MD Responsible Provider: Susan Crooks APRN - CRNA    Anesthesia Type: MAC ASA Status: 3            Anesthesia Type: No value filed.    Luz Phase I: Luz Score: 10    Luz Phase II:      Anesthesia Post Evaluation    Patient location during evaluation: floor  Patient participation: complete - patient participated  Level of consciousness: awake and alert  Pain score: 0  Airway patency: patent  Nausea & Vomiting: no nausea and no vomiting  Cardiovascular status: hemodynamically stable  Respiratory status: room air  Hydration status: euvolemic  Pain management: adequate    No notable events documented.

## 2024-04-16 NOTE — DISCHARGE INSTRUCTIONS
equipment.  Do not drink any alcoholic beverages.  Do not smoke while alone.  Avoid making important decisions.  Plan to spend a quiet, relaxed evening @ home.  Resume normal activities as you begin to feel better.  Eat lightly for your first meal, then gradually increase your diet to what is normal for you.  In case of nausea, avoid food and drink only clear liquids.  Resume food as nausea ceases.  Notify your surgeon if you experience fever, chills, large amount of bleeding, difficulty breathing, persistent nausea and vomiting or any other disturbing problem.  Call for a follow-up appointment with your surgeon.

## 2024-04-17 NOTE — RESULT ENCOUNTER NOTE
Surgical pathology shows that the colon polyp that was found was tubular adenoma. You will need a repeat colonoscopy in 5 years.

## 2024-04-30 DIAGNOSIS — R45.4 EXCESSIVE ANGER: ICD-10-CM

## 2024-05-01 RX ORDER — ESCITALOPRAM OXALATE 10 MG/1
10 TABLET ORAL DAILY
Qty: 30 TABLET | Refills: 3 | Status: SHIPPED | OUTPATIENT
Start: 2024-05-01

## 2024-05-10 ENCOUNTER — OFFICE VISIT (OUTPATIENT)
Dept: FAMILY MEDICINE CLINIC | Age: 44
End: 2024-05-10
Payer: COMMERCIAL

## 2024-05-10 VITALS
HEIGHT: 72 IN | DIASTOLIC BLOOD PRESSURE: 70 MMHG | SYSTOLIC BLOOD PRESSURE: 130 MMHG | RESPIRATION RATE: 18 BRPM | BODY MASS INDEX: 26.3 KG/M2 | WEIGHT: 194.2 LBS | HEART RATE: 63 BPM | OXYGEN SATURATION: 96 %

## 2024-05-10 DIAGNOSIS — I10 PRIMARY HYPERTENSION: ICD-10-CM

## 2024-05-10 PROCEDURE — 3078F DIAST BP <80 MM HG: CPT | Performed by: PHYSICIAN ASSISTANT

## 2024-05-10 PROCEDURE — 4004F PT TOBACCO SCREEN RCVD TLK: CPT | Performed by: PHYSICIAN ASSISTANT

## 2024-05-10 PROCEDURE — G8417 CALC BMI ABV UP PARAM F/U: HCPCS | Performed by: PHYSICIAN ASSISTANT

## 2024-05-10 PROCEDURE — 3075F SYST BP GE 130 - 139MM HG: CPT | Performed by: PHYSICIAN ASSISTANT

## 2024-05-10 PROCEDURE — G8427 DOCREV CUR MEDS BY ELIG CLIN: HCPCS | Performed by: PHYSICIAN ASSISTANT

## 2024-05-10 PROCEDURE — 99214 OFFICE O/P EST MOD 30 MIN: CPT | Performed by: PHYSICIAN ASSISTANT

## 2024-05-10 RX ORDER — LOSARTAN POTASSIUM 100 MG/1
100 TABLET ORAL DAILY
Qty: 30 TABLET | Refills: 2 | Status: SHIPPED | OUTPATIENT
Start: 2024-05-10

## 2024-05-10 NOTE — PATIENT INSTRUCTIONS
Welcome to [facility]:    Did you know we now have a faster way for you to move through your appointment? For your convenience, we now have digital registration available. When you schedule your next appointment, you will receive a link via your email as well as a text message that will allow you to complete any paperwork digitally before your appointment. We are committed to providing you the best care possible.    If you receive a survey after visiting one of our offices, please take time to share your experience concerning your physician office visit.  These surveys are confidential and no health information about you is shared.    We are eager to improve for you and continue to give you satisfactory care, we are counting on your feedback to help make that happen.

## 2024-05-10 NOTE — PROGRESS NOTES
5/14/2024    Octavio CURTIS Miya    Chief Complaint   Patient presents with    3 Month Follow-Up     No questions or concerns       HPI  History was obtained from pt, wife with him.  Octavio is a 44 y.o. male with a PMHx as listed below who presents today for 3 month follow up.    Needs meds refilled, is overdue for blood work.     No concerns for now.       1. Primary hypertension             REVIEW OF SYMPTOMS    Review of Systems    PAST MEDICAL HISTORY  Past Medical History:   Diagnosis Date    Abnormal liver function tests     scheduled for liver bx 10/4/2013    Anxiety     Asthma     last flare up winter 2019    Calculus of ureter 01/22/2018    Chronic abdominal pain     COPD (chronic obstructive pulmonary disease) (HCC)     Depression     FAP (familial adenomatous polyposis)     family hx of this disorder per h&p    Hepatitis C 2006    \"took medication for this and no longer shows up in my blood\"    History of heroin use 07/16/2018    In the ER February 2015 with acute heroin overdose    History of kidney stones 03/27/2018    \"have stone now\"7/15/2020 follow with Dr Gunn    Hypertension     Moderate episode of recurrent major depressive disorder (HCC) 05/07/2014    Nausea & vomiting 10/17/2023    Neck pain     pinched nerve    Sleep apnea     sleep study 2017- had cpap    Wears glasses     to read       FAMILY HISTORY  Family History   Problem Relation Age of Onset    COPD Mother     Diabetes Father     Heart Attack Father     Heart Surgery Father     Heart Disease Father         Mi, chf       SOCIAL HISTORY  Social History     Socioeconomic History    Marital status:      Spouse name: None    Number of children: None    Years of education: None    Highest education level: None   Tobacco Use    Smoking status: Every Day     Current packs/day: 0.50     Average packs/day: 0.5 packs/day for 18.0 years (9.0 ttl pk-yrs)     Types: Cigarettes    Smokeless tobacco: Never   Vaping Use    Vaping Use: Never used

## 2024-06-11 ENCOUNTER — HOSPITAL ENCOUNTER (EMERGENCY)
Age: 44
Discharge: HOME | End: 2024-06-11
Attending: EMERGENCY MEDICINE
Payer: COMMERCIAL

## 2024-06-11 ENCOUNTER — APPOINTMENT (OUTPATIENT)
Dept: CT IMAGING | Age: 44
End: 2024-06-11
Payer: COMMERCIAL

## 2024-06-11 VITALS
DIASTOLIC BLOOD PRESSURE: 88 MMHG | TEMPERATURE: 98.9 F | HEIGHT: 72 IN | HEART RATE: 48 BPM | WEIGHT: 200 LBS | BODY MASS INDEX: 27.09 KG/M2 | SYSTOLIC BLOOD PRESSURE: 143 MMHG | RESPIRATION RATE: 18 BRPM | OXYGEN SATURATION: 100 %

## 2024-06-11 DIAGNOSIS — N20.9 CALCULUS OF UPPER URINARY TRACT: Primary | ICD-10-CM

## 2024-06-11 LAB
BACTERIA: 0 /HPF
BILIRUBIN, URINE: NEGATIVE MG/DL
BLOOD, URINE: ABNORMAL
CAST TYPE: ABNORMAL /HPF
CLARITY: CLEAR
COLOR: YELLOW
CRYSTAL TYPE: NEGATIVE /HPF
EPITHELIAL CELLS, UA: 1 /HPF
GLUCOSE URINE: NEGATIVE MG/DL
KETONES, URINE: NEGATIVE MG/DL
LEUKOCYTE ESTERASE, URINE: NEGATIVE
NITRITE URINE, QUANTITATIVE: NEGATIVE
PH, URINE: 6 (ref 5–8)
PROTEIN UA: NEGATIVE MG/DL
RBC URINE: 3 /HPF (ref 0–3)
SPECIFIC GRAVITY UA: 1.02 (ref 1–1.03)
UROBILINOGEN, URINE: 0.2 MG/DL (ref 0.2–1)
WBC UA: 0 /HPF (ref 0–2)

## 2024-06-11 PROCEDURE — 81001 URINALYSIS AUTO W/SCOPE: CPT

## 2024-06-11 PROCEDURE — 6360000002 HC RX W HCPCS: Performed by: EMERGENCY MEDICINE

## 2024-06-11 PROCEDURE — 74176 CT ABD & PELVIS W/O CONTRAST: CPT

## 2024-06-11 PROCEDURE — 96374 THER/PROPH/DIAG INJ IV PUSH: CPT

## 2024-06-11 PROCEDURE — 96365 THER/PROPH/DIAG IV INF INIT: CPT

## 2024-06-11 PROCEDURE — 96375 TX/PRO/DX INJ NEW DRUG ADDON: CPT

## 2024-06-11 PROCEDURE — 99284 EMERGENCY DEPT VISIT MOD MDM: CPT

## 2024-06-11 PROCEDURE — 2580000003 HC RX 258: Performed by: EMERGENCY MEDICINE

## 2024-06-11 PROCEDURE — 87086 URINE CULTURE/COLONY COUNT: CPT

## 2024-06-11 RX ORDER — ONDANSETRON 2 MG/ML
4 INJECTION INTRAMUSCULAR; INTRAVENOUS EVERY 6 HOURS PRN
Status: DISCONTINUED | OUTPATIENT
Start: 2024-06-11 | End: 2024-06-11 | Stop reason: HOSPADM

## 2024-06-11 RX ORDER — OXYCODONE HYDROCHLORIDE AND ACETAMINOPHEN 5; 325 MG/1; MG/1
1 TABLET ORAL EVERY 6 HOURS PRN
Qty: 12 TABLET | Refills: 0 | Status: SHIPPED | OUTPATIENT
Start: 2024-06-11 | End: 2024-06-14

## 2024-06-11 RX ORDER — KETOROLAC TROMETHAMINE 15 MG/ML
15 INJECTION, SOLUTION INTRAMUSCULAR; INTRAVENOUS ONCE
Status: COMPLETED | OUTPATIENT
Start: 2024-06-11 | End: 2024-06-11

## 2024-06-11 RX ADMIN — KETOROLAC TROMETHAMINE 15 MG: 15 INJECTION, SOLUTION INTRAMUSCULAR; INTRAVENOUS at 08:18

## 2024-06-11 RX ADMIN — LIDOCAINE HYDROCHLORIDE 136 MG: 20 INJECTION INTRAVENOUS at 09:09

## 2024-06-11 RX ADMIN — HYDROMORPHONE HYDROCHLORIDE 1 MG: 1 INJECTION, SOLUTION INTRAMUSCULAR; INTRAVENOUS; SUBCUTANEOUS at 08:20

## 2024-06-11 ASSESSMENT — PAIN DESCRIPTION - DESCRIPTORS
DESCRIPTORS: ACHING;DISCOMFORT
DESCRIPTORS: ACHING;DISCOMFORT

## 2024-06-11 ASSESSMENT — PAIN SCALES - GENERAL
PAINLEVEL_OUTOF10: 10

## 2024-06-11 ASSESSMENT — LIFESTYLE VARIABLES
HOW OFTEN DO YOU HAVE A DRINK CONTAINING ALCOHOL: MONTHLY OR LESS
HOW MANY STANDARD DRINKS CONTAINING ALCOHOL DO YOU HAVE ON A TYPICAL DAY: 1 OR 2

## 2024-06-11 ASSESSMENT — PAIN DESCRIPTION - ORIENTATION
ORIENTATION: RIGHT

## 2024-06-11 ASSESSMENT — PAIN - FUNCTIONAL ASSESSMENT
PAIN_FUNCTIONAL_ASSESSMENT: PREVENTS OR INTERFERES SOME ACTIVE ACTIVITIES AND ADLS
PAIN_FUNCTIONAL_ASSESSMENT: 0-10
PAIN_FUNCTIONAL_ASSESSMENT: PREVENTS OR INTERFERES WITH MANY ACTIVE NOT PASSIVE ACTIVITIES

## 2024-06-11 ASSESSMENT — PAIN DESCRIPTION - LOCATION
LOCATION: FLANK
LOCATION: ABDOMEN;FLANK

## 2024-06-11 ASSESSMENT — PAIN DESCRIPTION - FREQUENCY: FREQUENCY: CONTINUOUS

## 2024-06-11 ASSESSMENT — ENCOUNTER SYMPTOMS
ABDOMINAL DISTENTION: 0
ABDOMINAL PAIN: 1

## 2024-06-11 ASSESSMENT — PAIN DESCRIPTION - PAIN TYPE
TYPE: ACUTE PAIN
TYPE: ACUTE PAIN

## 2024-06-11 NOTE — ED PROVIDER NOTES
Triage Chief Complaint:   Abdominal Pain and Flank Pain (C/o rt sided abd pains and flank pains since 5 am. C/o vomiting one time)    Robinson:  Octavio Holliday is a 44 y.o. male that presents to the ED ambulatory from work.  Patient has pain to his right flank came on yesterday he denies any urgency frequency.  He said kidney stones multiple times states he sees Dr. Gunn and has had 3 surgical interventions.  Ice in the room actually chilling.  Denies any reported fever.        Past Medical History:   Diagnosis Date    Abnormal liver function tests     scheduled for liver bx 10/4/2013    Anxiety     Asthma     last flare up winter 2019    Calculus of ureter 01/22/2018    Chronic abdominal pain     COPD (chronic obstructive pulmonary disease) (HCC)     Depression     FAP (familial adenomatous polyposis)     family hx of this disorder per h&p    Hepatitis C 2006    \"took medication for this and no longer shows up in my blood\"    History of heroin use 07/16/2018    In the ER February 2015 with acute heroin overdose    History of kidney stones 03/27/2018    \"have stone now\"7/15/2020 follow with Dr Gunn    Hypertension     Moderate episode of recurrent major depressive disorder (HCC) 05/07/2014    Nausea & vomiting 10/17/2023    Neck pain     pinched nerve    Sleep apnea     sleep study 2017- had cpap    Wears glasses     to read     Past Surgical History:   Procedure Laterality Date    APPENDECTOMY  age 15    COLONOSCOPY  9/23/13    int hem    COLONOSCOPY N/A 4/16/2024    COLONOSCOPY POLYPECTOMY SNARE BIOPSY performed by Merissa Burrell MD at [facility] ENDOSCOPY    CYSTOSCOPY Right 7/16/2020    RIGHT CYSTOSCOPY STONE MANIPULATION performed by Santino Hooper MD at [facility] OR    CYSTOSCOPY INSERTION / REMOVAL STENT / STONE Bilateral 7/16/2020    BILATERAL CYSTOSCOPY RETROGRADE PYELOGRAM STENT INSERTION performed by Santino Hooper MD at [facility] OR    ENDOSCOPY, COLON, DIAGNOSTIC  11/07/2014    gastritis,hiatal hernia    KIDNEY STONE

## 2024-06-12 LAB
CULTURE: NORMAL
Lab: NORMAL
SPECIMEN: NORMAL

## 2024-07-12 DIAGNOSIS — I10 PRIMARY HYPERTENSION: ICD-10-CM

## 2024-07-12 RX ORDER — LOSARTAN POTASSIUM 100 MG/1
100 TABLET ORAL DAILY
Qty: 30 TABLET | Refills: 2 | Status: SHIPPED | OUTPATIENT
Start: 2024-07-12

## 2024-11-29 DIAGNOSIS — I10 PRIMARY HYPERTENSION: ICD-10-CM

## 2024-12-03 RX ORDER — LOSARTAN POTASSIUM 100 MG/1
100 TABLET ORAL DAILY
Qty: 30 TABLET | Refills: 2 | Status: SHIPPED | OUTPATIENT
Start: 2024-12-03

## 2025-02-28 DIAGNOSIS — I10 PRIMARY HYPERTENSION: ICD-10-CM

## 2025-03-04 RX ORDER — LOSARTAN POTASSIUM 100 MG/1
100 TABLET ORAL DAILY
Qty: 30 TABLET | Refills: 3 | Status: SHIPPED | OUTPATIENT
Start: 2025-03-04

## 2025-05-23 DIAGNOSIS — I10 PRIMARY HYPERTENSION: ICD-10-CM

## 2025-05-23 RX ORDER — LOSARTAN POTASSIUM 100 MG/1
100 TABLET ORAL DAILY
Qty: 30 TABLET | Refills: 0 | Status: SHIPPED | OUTPATIENT
Start: 2025-05-23 | End: 2025-06-06

## 2025-06-06 ENCOUNTER — OFFICE VISIT (OUTPATIENT)
Dept: FAMILY MEDICINE CLINIC | Age: 45
End: 2025-06-06
Payer: COMMERCIAL

## 2025-06-06 VITALS
OXYGEN SATURATION: 98 % | RESPIRATION RATE: 18 BRPM | HEART RATE: 74 BPM | BODY MASS INDEX: 23.84 KG/M2 | SYSTOLIC BLOOD PRESSURE: 138 MMHG | HEIGHT: 72 IN | DIASTOLIC BLOOD PRESSURE: 80 MMHG | WEIGHT: 176 LBS

## 2025-06-06 DIAGNOSIS — H65.93 MEE (MIDDLE EAR EFFUSION), BILATERAL: Primary | ICD-10-CM

## 2025-06-06 DIAGNOSIS — I10 PRIMARY HYPERTENSION: ICD-10-CM

## 2025-06-06 DIAGNOSIS — J44.9 COPD WITHOUT EXACERBATION (HCC): ICD-10-CM

## 2025-06-06 DIAGNOSIS — G47.30 SLEEP APNEA, UNSPECIFIED TYPE: ICD-10-CM

## 2025-06-06 DIAGNOSIS — Z87.891 PERSONAL HISTORY OF TOBACCO USE: ICD-10-CM

## 2025-06-06 PROCEDURE — G8420 CALC BMI NORM PARAMETERS: HCPCS | Performed by: PHYSICIAN ASSISTANT

## 2025-06-06 PROCEDURE — 3075F SYST BP GE 130 - 139MM HG: CPT | Performed by: PHYSICIAN ASSISTANT

## 2025-06-06 PROCEDURE — 4004F PT TOBACCO SCREEN RCVD TLK: CPT | Performed by: PHYSICIAN ASSISTANT

## 2025-06-06 PROCEDURE — G8427 DOCREV CUR MEDS BY ELIG CLIN: HCPCS | Performed by: PHYSICIAN ASSISTANT

## 2025-06-06 PROCEDURE — G2211 COMPLEX E/M VISIT ADD ON: HCPCS | Performed by: PHYSICIAN ASSISTANT

## 2025-06-06 PROCEDURE — 3023F SPIROM DOC REV: CPT | Performed by: PHYSICIAN ASSISTANT

## 2025-06-06 PROCEDURE — 3079F DIAST BP 80-89 MM HG: CPT | Performed by: PHYSICIAN ASSISTANT

## 2025-06-06 PROCEDURE — 99214 OFFICE O/P EST MOD 30 MIN: CPT | Performed by: PHYSICIAN ASSISTANT

## 2025-06-06 RX ORDER — FLUTICASONE PROPIONATE 50 MCG
2 SPRAY, SUSPENSION (ML) NASAL DAILY
Qty: 16 G | Refills: 0 | Status: SHIPPED | OUTPATIENT
Start: 2025-06-06

## 2025-06-06 SDOH — ECONOMIC STABILITY: FOOD INSECURITY: WITHIN THE PAST 12 MONTHS, THE FOOD YOU BOUGHT JUST DIDN'T LAST AND YOU DIDN'T HAVE MONEY TO GET MORE.: NEVER TRUE

## 2025-06-06 SDOH — ECONOMIC STABILITY: FOOD INSECURITY: WITHIN THE PAST 12 MONTHS, YOU WORRIED THAT YOUR FOOD WOULD RUN OUT BEFORE YOU GOT MONEY TO BUY MORE.: NEVER TRUE

## 2025-06-06 ASSESSMENT — PATIENT HEALTH QUESTIONNAIRE - PHQ9
2. FEELING DOWN, DEPRESSED OR HOPELESS: NOT AT ALL
SUM OF ALL RESPONSES TO PHQ QUESTIONS 1-9: 0
1. LITTLE INTEREST OR PLEASURE IN DOING THINGS: NOT AT ALL

## 2025-06-06 NOTE — PROGRESS NOTES
reflex to FT4; Future    3. COPD without exacerbation (HCC)  Stable, pt encouraged to stop smoking    4. Personal history of tobacco use      5. Sleep apnea, unspecified type  Encouraged to follow up for another sleep study  - CBC with Auto Differential; Future  - Comprehensive Metabolic Panel; Future  - Lipid, Fasting; Future  - TSH reflex to FT4; Future    Return for fasting labs and follow for results  No follow-ups on file.         Electronically signed by PHIL Mercado on 6/9/2025      Comment: Please note this report has been produced using speech recognition software and may contain errors related to that system including errors in grammar, punctuation, and spelling, as well as words and phrases that may be inappropriate. If there are any questions or concerns please feel free to contact the dictating provider for clarification.    The patient (or guardian, if applicable) and other individuals in attendance with the patient were advised that Artificial Intelligence will be utilized during this visit to record, process the conversation to generate a clinical note, and support improvement of the AI technology. The patient (or guardian, if applicable) and other individuals in attendance at the appointment consented to the use of AI, including the recording.

## 2025-06-06 NOTE — PATIENT INSTRUCTIONS
we now have a faster way for you to move through your appointment? For your convenience, we now have digital registration available. When you schedule your next appointment, you will receive a link via your email as well as a text message that will allow you to complete any paperwork digitally before your appointment.

## (undated) DEVICE — JELLY,LUBE,STERILE,FLIP TOP,TUBE,2-OZ: Brand: MEDLINE

## (undated) DEVICE — NITINOL STONE RETRIEVAL BASKET: Brand: ZERO TIP

## (undated) DEVICE — STERILE POLYISOPRENE POWDER-FREE SURGICAL GLOVES: Brand: PROTEXIS

## (undated) DEVICE — GUIDEWIRE ENDOSCP L150CM DIA0.035IN TIP 3CM PTFE NIT

## (undated) DEVICE — BAG DRNGE W 8MM ADPT AND SUCT HOSE CYSTO UROLOGICAL FOR

## (undated) DEVICE — FORCEPS BX L240CM JAW DIA2.8MM L CAP W/ NDL MIC MESH TOOTH

## (undated) DEVICE — CATHETER URET 5FR L70CM TIP 8FR OPN END CONE TIP INJ HUB

## (undated) DEVICE — SNARE VASC L240CM LOOP W10MM SHTH DIA2.4MM RND STIFF CLD

## (undated) DEVICE — TOWEL,OR,DSP,ST,BLUE,STD,6/PK,12PK/CS: Brand: MEDLINE

## (undated) DEVICE — Z INACTIVE USE 2635503 SOLUTION IRRIG 3000ML ST H2O USP UROMATIC PLAS CONT

## (undated) DEVICE — URETERAL ACCESS SHEATH SET: Brand: NAVIGATOR HD

## (undated) DEVICE — MARKER SURG SKIN UTIL REGULAR/FINE 2 TIP W/ RUL AND 9 LBL

## (undated) DEVICE — MAT FLOOR ULTRA ABS 28X48IN

## (undated) DEVICE — SINGLE PORT MANIFOLD: Brand: NEPTUNE 2

## (undated) DEVICE — TRAY PREP DRY W/ PREM GLV 2 APPL 6 SPNG 2 UNDPD 1 OVERWRAP

## (undated) DEVICE — TUBING, SUCTION, 9/32" X 10', STRAIGHT: Brand: MEDLINE

## (undated) DEVICE — SET IRRIG L94IN ID0.281IN W/ 4.5IN DST FLX CONN 2 LD ON OFF

## (undated) DEVICE — CONTAINER,SPECIMEN,OR STERILE,4OZ: Brand: MEDLINE

## (undated) DEVICE — ENDOSCOPY KIT: Brand: MEDLINE INDUSTRIES, INC.